# Patient Record
Sex: FEMALE | Race: BLACK OR AFRICAN AMERICAN | NOT HISPANIC OR LATINO | Employment: FULL TIME | URBAN - METROPOLITAN AREA
[De-identification: names, ages, dates, MRNs, and addresses within clinical notes are randomized per-mention and may not be internally consistent; named-entity substitution may affect disease eponyms.]

---

## 2017-05-23 ENCOUNTER — ALLSCRIPTS OFFICE VISIT (OUTPATIENT)
Dept: OTHER | Facility: OTHER | Age: 52
End: 2017-05-23

## 2017-05-23 DIAGNOSIS — S46.119A STRAIN OF MUSCLE, FASCIA AND TENDON OF LONG HEAD OF BICEPS, UNSPECIFIED ARM, INITIAL ENCOUNTER: ICD-10-CM

## 2017-09-06 ENCOUNTER — ALLSCRIPTS OFFICE VISIT (OUTPATIENT)
Dept: OTHER | Facility: OTHER | Age: 52
End: 2017-09-06

## 2018-01-14 VITALS
HEART RATE: 72 BPM | BODY MASS INDEX: 39.93 KG/M2 | RESPIRATION RATE: 16 BRPM | DIASTOLIC BLOOD PRESSURE: 86 MMHG | TEMPERATURE: 96.8 F | WEIGHT: 217 LBS | SYSTOLIC BLOOD PRESSURE: 132 MMHG | HEIGHT: 62 IN

## 2018-01-14 VITALS
SYSTOLIC BLOOD PRESSURE: 128 MMHG | BODY MASS INDEX: 39.98 KG/M2 | HEIGHT: 62 IN | DIASTOLIC BLOOD PRESSURE: 70 MMHG | RESPIRATION RATE: 16 BRPM | TEMPERATURE: 97.4 F | HEART RATE: 70 BPM | WEIGHT: 217.25 LBS

## 2018-02-09 ENCOUNTER — TELEPHONE (OUTPATIENT)
Dept: FAMILY MEDICINE CLINIC | Facility: CLINIC | Age: 53
End: 2018-02-09

## 2018-02-09 ENCOUNTER — OFFICE VISIT (OUTPATIENT)
Dept: FAMILY MEDICINE CLINIC | Facility: CLINIC | Age: 53
End: 2018-02-09
Payer: COMMERCIAL

## 2018-02-09 VITALS
RESPIRATION RATE: 16 BRPM | WEIGHT: 229.6 LBS | TEMPERATURE: 98.4 F | SYSTOLIC BLOOD PRESSURE: 124 MMHG | HEART RATE: 68 BPM | BODY MASS INDEX: 41.99 KG/M2 | DIASTOLIC BLOOD PRESSURE: 92 MMHG

## 2018-02-09 DIAGNOSIS — R07.9 CHEST PAIN, UNSPECIFIED TYPE: Primary | ICD-10-CM

## 2018-02-09 DIAGNOSIS — M94.0 COSTOCHONDRITIS: ICD-10-CM

## 2018-02-09 PROBLEM — S46.119A STRAIN OF LONG HEAD OF BICEPS: Status: ACTIVE | Noted: 2017-05-23

## 2018-02-09 PROBLEM — I83.90 VARICOSE VEIN OF LEG: Status: ACTIVE | Noted: 2017-09-06

## 2018-02-09 PROBLEM — S76.019A STRAIN OF HIP: Status: ACTIVE | Noted: 2017-09-06

## 2018-02-09 PROBLEM — E66.9 OBESITY, UNSPECIFIED OBESITY SEVERITY, UNSPECIFIED OBESITY TYPE: Status: ACTIVE | Noted: 2017-05-23

## 2018-02-09 PROCEDURE — 99214 OFFICE O/P EST MOD 30 MIN: CPT | Performed by: NURSE PRACTITIONER

## 2018-02-09 PROCEDURE — 93000 ELECTROCARDIOGRAM COMPLETE: CPT | Performed by: NURSE PRACTITIONER

## 2018-02-09 RX ORDER — MELOXICAM 15 MG/1
TABLET ORAL
COMMUNITY
Start: 2017-09-06 | End: 2018-02-09 | Stop reason: ALTCHOICE

## 2018-02-09 NOTE — PROGRESS NOTES
Assessment/Plan:       Diagnoses and all orders for this visit:    Chest pain, unspecified type  -     POCT ECG  EKG WNL, no acute ischemia  Discussed differentials for chest pain including musculoskeletal pain, as she has tenderness on palpation over left chest wall, or esophagitis  Will trial Tylenol and Zantac BID  Costochondritis              No Follow-up on file  Subjective:      Patient ID: Derrick Cho is a 47 y o  female  Chief Complaint   Patient presents with    Chest Pain     yesterday 2/8/18    Nausea    Fatigue       She woke up yesterday morning with chest pain, which has been intermittent  This is accompanied by nausea and is worse when she eats or drinks  It is a tightness sensation and also worse with movement  Not exacerbated my exertion  Denies sore throat, palpitations, lower extremity swelling, SOB, wheezing, vomiting or diarrhea  She has phlegm in her throat  No personal cardiac history  Has not taken anything OTC for symptoms  The following portions of the patient's history were reviewed and updated as appropriate: allergies, current medications, past family history, past medical history, past social history, past surgical history and problem list     Review of Systems   Constitutional: Positive for fatigue  Negative for chills and fever  HENT: Positive for postnasal drip  Respiratory: Negative for cough, shortness of breath and wheezing  Cardiovascular: Positive for chest pain  Negative for palpitations and leg swelling  Gastrointestinal: Positive for nausea  Negative for vomiting  Neurological: Positive for headaches  No current outpatient prescriptions on file  No current facility-administered medications for this visit  Objective:    /92   Pulse 68   Temp 98 4 °F (36 9 °C)   Resp 16   Wt 104 kg (229 lb 9 6 oz)   BMI 41 99 kg/m²        Physical Exam   Constitutional: She appears well-developed and well-nourished  HENT:   Right Ear: Tympanic membrane, external ear and ear canal normal    Left Ear: Tympanic membrane, external ear and ear canal normal    Nose: No mucosal edema  Mouth/Throat: Oropharynx is clear and moist and mucous membranes are normal    Eyes: Conjunctivae are normal    Cardiovascular: Normal rate, regular rhythm and normal heart sounds  Pulmonary/Chest: Effort normal and breath sounds normal    Abdominal: Bowel sounds are normal  She exhibits no distension  There is no splenomegaly or hepatomegaly  There is no tenderness  Musculoskeletal:   Left chest wall tender on palpation  Lymphadenopathy:        Right cervical: No superficial cervical adenopathy present  Left cervical: No superficial cervical adenopathy present  Skin: No rash noted  Psychiatric: She has a normal mood and affect                Jett Cullen

## 2018-02-09 NOTE — TELEPHONE ENCOUNTER
She called and stated she has had chest pain for the last two days  She stated she has some nausea but no other symptoms  She has no shortness of breath  Appointment made for today

## 2018-02-09 NOTE — PATIENT INSTRUCTIONS
Tylenol 650mg every 4-6 hours  May also try Zantac (ranitidine) 150mg twice daily for esophagitis/reflux symptoms

## 2018-05-04 ENCOUNTER — OFFICE VISIT (OUTPATIENT)
Dept: FAMILY MEDICINE CLINIC | Facility: CLINIC | Age: 53
End: 2018-05-04
Payer: COMMERCIAL

## 2018-05-04 VITALS
WEIGHT: 233 LBS | TEMPERATURE: 98.1 F | SYSTOLIC BLOOD PRESSURE: 120 MMHG | HEIGHT: 62 IN | DIASTOLIC BLOOD PRESSURE: 80 MMHG | HEART RATE: 82 BPM | BODY MASS INDEX: 42.88 KG/M2 | RESPIRATION RATE: 20 BRPM

## 2018-05-04 DIAGNOSIS — N30.01 ACUTE CYSTITIS WITH HEMATURIA: Primary | ICD-10-CM

## 2018-05-04 PROBLEM — S76.019A STRAIN OF HIP: Status: RESOLVED | Noted: 2017-09-06 | Resolved: 2018-05-04

## 2018-05-04 PROBLEM — S46.119A STRAIN OF LONG HEAD OF BICEPS: Status: RESOLVED | Noted: 2017-05-23 | Resolved: 2018-05-04

## 2018-05-04 PROBLEM — E66.9 OBESITY, UNSPECIFIED OBESITY SEVERITY, UNSPECIFIED OBESITY TYPE: Status: RESOLVED | Noted: 2017-05-23 | Resolved: 2018-05-04

## 2018-05-04 LAB
SL AMB  POCT GLUCOSE, UA: NORMAL
SL AMB LEUKOCYTE ESTERASE,UA: 75
SL AMB POCT BILIRUBIN,UA: NEGATIVE
SL AMB POCT BLOOD,UA: 250
SL AMB POCT CLARITY,UA: ABNORMAL
SL AMB POCT COLOR,UA: YELLOW
SL AMB POCT KETONES,UA: NEGATIVE
SL AMB POCT NITRITE,UA: NEGATIVE
SL AMB POCT PH,UA: 7
SL AMB POCT SPECIFIC GRAVITY,UA: 1.01
SL AMB POCT URINE PROTEIN: NEGATIVE
SL AMB POCT UROBILINOGEN: NORMAL

## 2018-05-04 PROCEDURE — 81003 URINALYSIS AUTO W/O SCOPE: CPT | Performed by: FAMILY MEDICINE

## 2018-05-04 PROCEDURE — 99213 OFFICE O/P EST LOW 20 MIN: CPT | Performed by: FAMILY MEDICINE

## 2018-05-04 RX ORDER — CIPROFLOXACIN 250 MG/1
250 TABLET, FILM COATED ORAL EVERY 12 HOURS SCHEDULED
Qty: 6 TABLET | Refills: 0 | Status: SHIPPED | OUTPATIENT
Start: 2018-05-04 | End: 2018-05-07

## 2018-05-04 NOTE — PROGRESS NOTES
Assessment/Plan:    No problem-specific Assessment & Plan notes found for this encounter  ua pos    Hematuria in past, seen urology in past and normal     Diagnoses and all orders for this visit:    Acute cystitis with hematuria  -     ciprofloxacin (CIPRO) 250 mg tablet; Take 1 tablet (250 mg total) by mouth every 12 (twelve) hours for 3 days  -     POCT urine dip auto non-scope        Urinate after intercourse  Probiotics  Hx of normal hematuria w/u      Return if symptoms worsen or fail to improve  Subjective:      Patient ID: Ceci Gann is a 47 y o  female  Chief Complaint   Patient presents with    Possible UTI     frequent urination    Difficulty Urinating     started this morning        HPI  Urgency, frequency, dysuria, cloudy, 2d, no fever, no dc, no high risk exposure, 1-2x/10y, no f/c  Hysterectomy 2y ago, no periods      The following portions of the patient's history were reviewed and updated as appropriate: allergies, current medications, past family history, past medical history, past social history, past surgical history and problem list     Review of Systems   Constitutional: Negative for fever  Respiratory: Negative for shortness of breath  Current Outpatient Prescriptions   Medication Sig Dispense Refill    ciprofloxacin (CIPRO) 250 mg tablet Take 1 tablet (250 mg total) by mouth every 12 (twelve) hours for 3 days 6 tablet 0     No current facility-administered medications for this visit  Objective:    /80   Pulse 82   Temp 98 1 °F (36 7 °C)   Resp 20   Ht 5' 2" (1 575 m)   Wt 106 kg (233 lb)   BMI 42 62 kg/m²        Physical Exam   Constitutional: She appears well-developed  HENT:   Head: Normocephalic  Eyes: Conjunctivae are normal    Neck: Neck supple  Cardiovascular: Normal rate and intact distal pulses  Pulmonary/Chest: Effort normal  No respiratory distress  Abdominal: Soft  Musculoskeletal: She exhibits no edema or deformity  Neurological: She is alert  Skin: Skin is warm and dry  Psychiatric: Her behavior is normal  Thought content normal    Nursing note and vitals reviewed        No cvat       Robinson Bertha DO

## 2018-09-06 ENCOUNTER — TELEPHONE (OUTPATIENT)
Dept: FAMILY MEDICINE CLINIC | Facility: CLINIC | Age: 53
End: 2018-09-06

## 2018-09-10 ENCOUNTER — HOSPITAL ENCOUNTER (EMERGENCY)
Facility: HOSPITAL | Age: 53
Discharge: HOME | End: 2018-09-10
Attending: EMERGENCY MEDICINE | Admitting: EMERGENCY MEDICINE
Payer: COMMERCIAL

## 2018-09-10 ENCOUNTER — APPOINTMENT (EMERGENCY)
Dept: CT IMAGING | Facility: HOSPITAL | Age: 53
End: 2018-09-10
Payer: COMMERCIAL

## 2018-09-10 VITALS
SYSTOLIC BLOOD PRESSURE: 129 MMHG | HEART RATE: 88 BPM | OXYGEN SATURATION: 99 % | WEIGHT: 231.26 LBS | RESPIRATION RATE: 16 BRPM | TEMPERATURE: 98.3 F | BODY MASS INDEX: 42.3 KG/M2 | DIASTOLIC BLOOD PRESSURE: 60 MMHG

## 2018-09-10 DIAGNOSIS — R31.9 HEMATURIA: Primary | ICD-10-CM

## 2018-09-10 DIAGNOSIS — N63.20 LEFT BREAST MASS: ICD-10-CM

## 2018-09-10 LAB
ALBUMIN SERPL BCP-MCNC: 3.4 G/DL (ref 3.5–5)
ALP SERPL-CCNC: 113 U/L (ref 46–116)
ALT SERPL W P-5'-P-CCNC: 21 U/L (ref 12–78)
ANION GAP SERPL CALCULATED.3IONS-SCNC: 8 MMOL/L (ref 4–13)
APTT PPP: 27 SECONDS (ref 24–36)
AST SERPL W P-5'-P-CCNC: 12 U/L (ref 5–45)
BACTERIA UR QL AUTO: ABNORMAL /HPF
BASOPHILS # BLD AUTO: 0.05 THOUSANDS/ΜL (ref 0–0.1)
BASOPHILS NFR BLD AUTO: 1 % (ref 0–1)
BILIRUB SERPL-MCNC: 0.2 MG/DL (ref 0.2–1)
BILIRUB UR QL STRIP: NEGATIVE
BUN SERPL-MCNC: 11 MG/DL (ref 5–25)
CALCIUM SERPL-MCNC: 8.9 MG/DL (ref 8.3–10.1)
CHLORIDE SERPL-SCNC: 106 MMOL/L (ref 100–108)
CLARITY UR: ABNORMAL
CO2 SERPL-SCNC: 29 MMOL/L (ref 21–32)
COLOR UR: ABNORMAL
CREAT SERPL-MCNC: 1.02 MG/DL (ref 0.6–1.3)
EOSINOPHIL # BLD AUTO: 0.15 THOUSAND/ΜL (ref 0–0.61)
EOSINOPHIL NFR BLD AUTO: 1 % (ref 0–6)
ERYTHROCYTE [DISTWIDTH] IN BLOOD BY AUTOMATED COUNT: 13.2 % (ref 11.6–15.1)
GFR SERPL CREATININE-BSD FRML MDRD: 74 ML/MIN/1.73SQ M
GLUCOSE SERPL-MCNC: 96 MG/DL (ref 65–140)
GLUCOSE UR STRIP-MCNC: NEGATIVE MG/DL
HCT VFR BLD AUTO: 43 % (ref 34.8–46.1)
HGB BLD-MCNC: 14 G/DL (ref 11.5–15.4)
HGB UR QL STRIP.AUTO: ABNORMAL
IMM GRANULOCYTES # BLD AUTO: 0.04 THOUSAND/UL (ref 0–0.2)
IMM GRANULOCYTES NFR BLD AUTO: 0 % (ref 0–2)
INR PPP: 0.84 (ref 0.86–1.17)
KETONES UR STRIP-MCNC: NEGATIVE MG/DL
LEUKOCYTE ESTERASE UR QL STRIP: ABNORMAL
LYMPHOCYTES # BLD AUTO: 3.83 THOUSANDS/ΜL (ref 0.6–4.47)
LYMPHOCYTES NFR BLD AUTO: 35 % (ref 14–44)
MCH RBC QN AUTO: 29.2 PG (ref 26.8–34.3)
MCHC RBC AUTO-ENTMCNC: 32.6 G/DL (ref 31.4–37.4)
MCV RBC AUTO: 90 FL (ref 82–98)
MONOCYTES # BLD AUTO: 0.65 THOUSAND/ΜL (ref 0.17–1.22)
MONOCYTES NFR BLD AUTO: 6 % (ref 4–12)
NEUTROPHILS # BLD AUTO: 6.22 THOUSANDS/ΜL (ref 1.85–7.62)
NEUTS SEG NFR BLD AUTO: 57 % (ref 43–75)
NITRITE UR QL STRIP: NEGATIVE
NON-SQ EPI CELLS URNS QL MICRO: ABNORMAL /HPF
NRBC BLD AUTO-RTO: 0 /100 WBCS
PH UR STRIP.AUTO: 6 [PH] (ref 4.5–8)
PLATELET # BLD AUTO: 254 THOUSANDS/UL (ref 149–390)
PMV BLD AUTO: 9.9 FL (ref 8.9–12.7)
POTASSIUM SERPL-SCNC: 4 MMOL/L (ref 3.5–5.3)
PROT SERPL-MCNC: 7.4 G/DL (ref 6.4–8.2)
PROT UR STRIP-MCNC: ABNORMAL MG/DL
PROTHROMBIN TIME: 11.3 SECONDS (ref 11.8–14.2)
RBC # BLD AUTO: 4.8 MILLION/UL (ref 3.81–5.12)
RBC #/AREA URNS AUTO: ABNORMAL /HPF
SODIUM SERPL-SCNC: 143 MMOL/L (ref 136–145)
SP GR UR STRIP.AUTO: >=1.03 (ref 1–1.03)
UROBILINOGEN UR QL STRIP.AUTO: 0.2 E.U./DL
WBC # BLD AUTO: 10.94 THOUSAND/UL (ref 4.31–10.16)
WBC #/AREA URNS AUTO: ABNORMAL /HPF

## 2018-09-10 PROCEDURE — 85610 PROTHROMBIN TIME: CPT | Performed by: PHYSICIAN ASSISTANT

## 2018-09-10 PROCEDURE — 87186 SC STD MICRODIL/AGAR DIL: CPT | Performed by: PHYSICIAN ASSISTANT

## 2018-09-10 PROCEDURE — 80053 COMPREHEN METABOLIC PANEL: CPT | Performed by: PHYSICIAN ASSISTANT

## 2018-09-10 PROCEDURE — 87086 URINE CULTURE/COLONY COUNT: CPT | Performed by: PHYSICIAN ASSISTANT

## 2018-09-10 PROCEDURE — 85730 THROMBOPLASTIN TIME PARTIAL: CPT | Performed by: PHYSICIAN ASSISTANT

## 2018-09-10 PROCEDURE — 99284 EMERGENCY DEPT VISIT MOD MDM: CPT

## 2018-09-10 PROCEDURE — 87077 CULTURE AEROBIC IDENTIFY: CPT | Performed by: PHYSICIAN ASSISTANT

## 2018-09-10 PROCEDURE — 85025 COMPLETE CBC W/AUTO DIFF WBC: CPT | Performed by: PHYSICIAN ASSISTANT

## 2018-09-10 PROCEDURE — 74176 CT ABD & PELVIS W/O CONTRAST: CPT

## 2018-09-10 PROCEDURE — 96374 THER/PROPH/DIAG INJ IV PUSH: CPT

## 2018-09-10 PROCEDURE — 81001 URINALYSIS AUTO W/SCOPE: CPT | Performed by: PHYSICIAN ASSISTANT

## 2018-09-10 PROCEDURE — 36415 COLL VENOUS BLD VENIPUNCTURE: CPT | Performed by: PHYSICIAN ASSISTANT

## 2018-09-10 RX ORDER — CIPROFLOXACIN 500 MG/1
500 TABLET, FILM COATED ORAL 2 TIMES DAILY
Qty: 14 TABLET | Refills: 0 | Status: SHIPPED | OUTPATIENT
Start: 2018-09-10 | End: 2018-09-17

## 2018-09-10 RX ORDER — MULTIVITAMIN
1 CAPSULE ORAL DAILY
COMMUNITY
End: 2020-12-23 | Stop reason: HOSPADM

## 2018-09-10 RX ORDER — CIPROFLOXACIN 500 MG/1
500 TABLET, FILM COATED ORAL ONCE
Status: COMPLETED | OUTPATIENT
Start: 2018-09-10 | End: 2018-09-10

## 2018-09-10 RX ORDER — IBUPROFEN 600 MG/1
600 TABLET ORAL EVERY 8 HOURS PRN
Qty: 15 TABLET | Refills: 0 | Status: SHIPPED | OUTPATIENT
Start: 2018-09-10 | End: 2018-09-21 | Stop reason: ALTCHOICE

## 2018-09-10 RX ADMIN — HYDROMORPHONE HYDROCHLORIDE 0.5 MG: 1 INJECTION, SOLUTION INTRAMUSCULAR; INTRAVENOUS; SUBCUTANEOUS at 07:56

## 2018-09-10 RX ADMIN — CIPROFLOXACIN 500 MG: 500 TABLET, FILM COATED ORAL at 08:30

## 2018-09-10 NOTE — ED CARE HANDOFF
Emergency Department Sign Out Note        Sign out and transfer of care from [facility], [place], operated by [facility]  See Separate Emergency Department note  The patient, Agustin Morales, was evaluated by the previous provider for hematuria and urinary frequency  Workup Completed:  yes    ED Course / Workup Pending (followup):  CT report pending                      ED Course as of Sep 10 0826   Mon Sep 10, 2018   6693 Phone call received from radiologist regarding study results  No stone or lesions appreciated within the urinary tract to account for patient's hematuria  Of note, study is limited with absence of contrast   Patient's symptoms were very acute in onset associated with urinary urgency, frequency as well as presence of a large amount of white blood cells in the urine  At this time treating as hemorrhagic cystitis  Will refer patient to follow up with Urology for further evaluation  Patient demonstrates understanding of this plan  Patient additionally informed of abnormal region incompletely visualized within the left breast   She does have a PCP and will follow up with them for breast imaging  She has not had a mammogram   She & visitor present demonstrate understanding of need to have this further evaluated  Procedures  MDM  CritCare Time      Disposition  Final diagnoses:   Hematuria   Left breast mass     Time reflects when diagnosis was documented in both MDM as applicable and the Disposition within this note     Time User Action Codes Description Comment    9/10/2018  8:18 AM Andrew BHAKTA Add [R31 9] Hematuria     9/10/2018  8:19 AM Andrew BHAKTA Add [N63 20] Left breast mass       ED Disposition     ED Disposition Condition Comment    Discharge  Agustin Morales discharge to home/self care      Condition at discharge: Good        Follow-up Information     Follow up With Specialties Details Why [address] [facility] Schedule an appointment as soon as possible for a visit For further evaluation of hematuria (blood in the urine) Norma [address] [place] [address] [facility] [address] [facility], DO Family Medicine Schedule an appointment as soon as possible for a visit For further evaluation of left breast abnormality seen on CT scan [address] [place] [address]  [phone]          Patient's Medications   Discharge Prescriptions    CIPROFLOXACIN (CIPRO) 500 MG TABLET    Take 1 tablet (500 mg total) by mouth 2 (two) times a day for 7 days       Start Date: 9/10/2018 End Date: 9/17/2018       Order Dose: 500 mg       Quantity: 14 tablet    Refills: 0    IBUPROFEN (MOTRIN) 600 MG TABLET    Take 1 tablet (600 mg total) by mouth every 8 (eight) hours as needed for mild pain (pain)       Start Date: 9/10/2018 End Date: --       Order Dose: 600 mg       Quantity: 15 tablet    Refills: 0     No discharge procedures on file         ED Provider  Electronically Signed by     Andres Fajardo MD  09/10/18 8704

## 2018-09-10 NOTE — ED PROVIDER NOTES
History  Chief Complaint   Patient presents with    Blood in Urine     per pt  she noticed some blood in her urine this morning, pt states her urine was bright red, pt denies any lightheadedness or dizziness  Gerardo Jimenez ([id number]) is a 47 y o   female Adult patient, presenting to the Emergency Department, accompanied by , who presents with a chief complaint of Patient presents with: Blood in Urine: per pt  she noticed some blood in her urine this morning, pt states her urine was bright red  Blood in Urine  -Patient with oscar hematuria  -Patient states that she has never, before a few months ago, never had a UTI  -Pain in the patients suprapubic region, as well as the patients lower back, radiating to her groin bilaterally  -Left lower back with more discomfort  -No fevers  -Patient woke up at 0430, and between 0430 and 0645, passed bloody urine roughly 20 times  -No CP  -No Dyspnea  -No upper abdominal pain  -Prior microscopic hematuria - renal and bladder evaluation was performed with  Cystoscopy, labs, and prior imaging, all with normal results  Medications: Patient takes no medications on a regular basis, Vitamins  Allergies: No reported food allergies, No reported environmental allergies, Amoxicillin  Overnight Hospitalizations: No prior hospitalizations  Vaccinations: Vaccinations UTD, as per Patient  Past Medical History: Past Medical History Includes: Prior hematuria              Prior to Admission Medications   Prescriptions Last Dose Informant Patient Reported? Taking?    Multiple Vitamin (MULTIVITAMIN) capsule   Yes Yes   Sig: Take 1 capsule by mouth daily      Facility-Administered Medications: None       Past Medical History:   Diagnosis Date    Bright red rectal bleeding     last assessed 4/15/15    Chronic constipation     last assessed 4/15/15     Hematuria     last assessed 4/6/15     Iron deficiency anemia     resolved 11/2/16        Past Surgical History:   Procedure Laterality Date     SECTION      CYSTOSCOPY  2015    diagnostic / Managed by Armen Wilson / dawson 7/29/15        Family History   Problem Relation Age of Onset    Colon cancer Mother     Hypertension Mother     Hypothyroidism Mother     Prostate cancer Father      I have reviewed and agree with the history as documented  Social History   Substance Use Topics    Smoking status: Former Smoker    Smokeless tobacco: Former User      Comment: never a smoker per allscript     Alcohol use No      Comment: non drinker/ no alcohol use         Review of Systems  Review of Systems: The Patient/Parent Denies the following: Negative, Except as noted in HPI  Physical Exam  Physical Exam  General: Well appearing 47 y o  female patient, in no acute distress  Skin: No rashes, masses, or lesions noted  HEENT: Atraumatic & Normocephalic  External ears normal, with no noted abnormalities or deformities  Bilateral canals examined, without noted edema or discomfort  No pain while pulling the tragus  TM well visualized bilaterally, with no noted obstruction, effusion, erythema, or air fluid levels  No noted enlargement of the mastoid processes bilaterally  EOMI, PERRL, Conjunctiva without injection bilaterally  No conjunctival drainage noted bilaterally  Nares patent bilaterally, with no noted obstructions, erythema, or drainage  No noted rhinorrhea  Pharynx well visualized, with no exudate noted in the posterior pharynx  Tonsils are not enlarged  Gingival surfaces are within normal limits  Neck: Soft, supple, and non-tender  No enlargement of the anterior cervical, posterior cervical, or occipital lymph notes  Cardiac: Regular rate and rhythm, with no noted murmurs, rubs, or gallops  Pulmonary: Normal Appearance  Clear to auscultation, with no noted rales, rhonchi, or wheezes  Abdomen: Normal appearance   Dull to palpation, except over the gastric bubble, which was mildly tympanic  Bowel sounds were within normal limits, with no noted high pitch sounds heard  Mildly tender to the suprapubic region, as well as mildly to the bilateral lower quadrants  Positive bilateral costovertebral angle tenderness noted bilaterally  No other abdominal tenderness was noted  Negative Evans sign  No pain with palpation at [facility]  Vital Signs  ED Triage Vitals   Temperature Pulse Respirations Blood Pressure SpO2   09/10/18 0637 09/10/18 0640 09/10/18 0640 09/10/18 0640 09/10/18 0640   98 3 °F (36 8 °C) 70 20 166/88 98 %      Temp Source Heart Rate Source Patient Position - Orthostatic VS BP Location FiO2 (%)   09/10/18 0637 09/10/18 0640 09/10/18 0640 09/10/18 0640 --   Oral Monitor Lying Right arm       Pain Score       --                  Vitals:    09/10/18 0640   BP: 166/88   Pulse: 70   Patient Position - Orthostatic VS: Lying       Visual Acuity      ED Medications  Medications - No data to display    Diagnostic Studies  Results Reviewed     Procedure Component Value Units Date/Time    Urine Microscopic [[id number]]  (Abnormal) Collected:  09/10/18 0651    Lab Status:  Final result Specimen:  Urine from Urine, Clean Catch Updated:  09/10/18 0712     RBC, UA Innumerable (A) /hpf      WBC, UA Innumerable (A) /hpf      Epithelial Cells Occasional /hpf      Bacteria, UA None Seen /hpf     Urine culture [[id number]] Collected:  09/10/18 0651    Lab Status: In process Specimen:  Urine from Urine, Clean Catch Updated:  09/10/18 0712    Comprehensive metabolic panel [[id number]] Collected:  09/10/18 0708    Lab Status: In process Specimen:  Blood from Arm, Right Updated:  09/10/18 67[address] [facility] [[id number]] Collected:  09/10/18 0708    Lab Status: In process Specimen:  Blood from Arm, Right Updated:  09/10/18 0712    APTT [[id number]] Collected:  09/10/18 0708    Lab Status:   In process Specimen:  Blood from Arm, Right Updated:  09/10/18 0712    CBC and differential [[id number]] Collected:  09/10/18 0708    Lab Status: In process Specimen:  Blood from Arm, Right Updated:  09/10/18 1175    UA w Reflex to Microscopic w Reflex to Culture [[id number]]  (Abnormal) Collected:  09/10/18 0651    Lab Status:  Final result Specimen:  Urine from Urine, Clean Catch Updated:  09/10/18 0705     Color, UA Red     Clarity, UA Cloudy     Specific Gravity, UA >=1 030     pH, UA 6 0     Leukocytes, UA Small (A)     Nitrite, UA Negative     Protein,  (2+) (A) mg/dl      Glucose, UA Negative mg/dl      Ketones, UA Negative mg/dl      Urobilinogen, UA 0 2 E U /dl      Bilirubin, UA Negative     Blood, UA Large (A)                 CT renal stone study abdomen pelvis without contrast    (Results Pending)              Procedures  Procedures       Phone Contacts  ED Phone Contact    ED Course                               MDM  Number of Diagnoses or Management Options  Diagnosis management comments: Patient presents the emergency department with complaint of flank pain, suprapubic discomfort, painful urination, as well as oscar bloody hematuria  The patient states she was in her normal state of health up until this morning, when she began to have oscar hematuria, associated with increased frequency of bladder movements, where as the patient had a total of greater than 20 bladder movements in a 3 hour period  As the patient was in her normal state of health up until yesterday evening, there is a notable concern for potential non infectious pathology, as well as renal stones, pyelonephritis, or complicated urinary tract infection  In addition, as the patient has had notably severe oscar hematuria, we there is also concern for potential malignant process, in addition to the above noted  As such, the patient will be evaluated with a basic laboratory analysis, including CBC, CMP, as well as PTT  In addition, as the hematuria was notably severe, the patient will be evaluated with a renal stone CT scan      This plan was discussed with the patient, and the patient is agreement and understanding of this plan  Amount and/or Complexity of Data Reviewed  Clinical lab tests: reviewed and ordered  Tests in the radiology section of CPT®: reviewed  Decide to obtain previous medical records or to obtain history from someone other than the patient: yes  Obtain history from someone other than the patient: yes  Review and summarize past medical records: yes  Discuss the patient with other providers: yes  Independent visualization of images, tracings, or specimens: yes    Risk of Complications, Morbidity, and/or Mortality  Presenting problems: moderate  Diagnostic procedures: moderate  Management options: moderate    Patient Progress  Patient progress: stable    CritCare Time    Disposition  Final diagnoses:   None     ED Disposition     None      Follow-up Information    None         Patient's Medications   Discharge Prescriptions    No medications on file     No discharge procedures on file      ED Provider  Electronically Signed by           Audrey Perez PA-C  09/10/18 6994

## 2018-09-10 NOTE — DISCHARGE INSTRUCTIONS
Acute Hematuria   WHAT YOU SHOULD KNOW:   Hematuria is blood in your urine from an injury or medical condition  Acute means the problem starts suddenly, worsens quickly, and lasts a short time  Your urine may be bright red to dark brown  Some common causes of hematuria are bladder infection, kidney stone, trauma to the kidneys or bladder, and some medications  Sometimes blood clots can block the urethra so that you cannot empty your bladder  AFTER YOU LEAVE:   Medicines:  Ask about these or other medicines you may need to treat the cause of your acute hematuria:  · Antibiotics: This medicine is given to fight or prevent an infection caused by bacteria  Always take your antibiotics exactly as ordered by your healthcare provider  Do not stop taking your medicine unless directed by your healthcare provider  Never save antibiotics or take leftover antibiotics that were given to you for another illness  · Take your medicine as directed  Call your healthcare provider if you think your medicine is not helping or if you have side effects  Tell him if you are allergic to any medicine  Keep a list of the medicines, vitamins, and herbs you take  Include the amounts, and when and why you take them  Bring the list or the pill bottles to follow-up visits  Carry your medicine list with you in case of an emergency  Increase the amount of fluid you drink:  Drink clear fluids to help flush the blood from your urinary tract  Follow instructions about how much fluid to drink  Follow up with your healthcare provider as directed: Your healthcare provider will tell you how often to come in for follow-up visits  He may refer you to a specialist, such as a urologist or nephrologist  The specialists may do tests or procedures to find more serious problems with your urinary system  Write down your questions so you remember to ask them during your visits     Contact your healthcare provider if:   · You have a fever that gets worse or does not go away with treatment  · You cannot keep liquids or medicines down  · Your urine gets darker, even after you drink extra liquids  · You have questions or concerns about your condition, treatment, or care  Seek care immediately or call 911 if:   · You have blood in your urine after a new injury, such as a fall  · You are urinating very small amounts or not at all  · You feel like you cannot empty your bladder  · You have severe back or side pain that does not go away with treatment  © 2014 [employer] is for End User's use only and may not be sold, redistributed or otherwise used for commercial purposes  All illustrations and images included in CareNotes® are the copyrighted property of [employer] M [employer]  or [employer]  The above information is an  only  It is not intended as medical advice for individual conditions or treatments  Talk to your doctor, nurse or pharmacist before following any medical regimen to see if it is safe and effective for you  Mammogram   WHAT YOU NEED TO KNOW:   What do I need to know about a mammogram?  A mammogram is an x-ray of your breasts to screen for breast cancer  Experts recommend mammograms every 2 years starting at age 48 years  You may need a mammogram at age 52 years or younger if you have an increased risk for breast cancer  Talk to your healthcare provider about when you should start having mammograms and how often you need them  How do I prepare for a mammogram?   · Do not use deodorant, powder, lotion, or perfume  These products may cause particles to appear on your mammogram      · Wear a 2-piece outfit  · If your breasts are tender before your monthly period, do not have a mammogram during this time  Schedule your mammogram to be done 1 week after your period ends      · If you are breastfeeding, express as much milk as possible before the mammogram     · Bring a list of the dates and places of your past mammograms and other breast tests or treatments  What will happen during a mammogram?  A top view and a side view x-ray are usually done for each breast  Tell healthcare providers if you have breast implants or breast problems before you have your mammogram  You may need extra x-rays of each breast   · You will be given a hospital gown  Take off your clothes from the waist up  Wear the hospital gown so that it opens in the front  · You will sit or stand next to a small x-ray machine  The healthcare provider will help you place one of your breasts on the x-ray plate  Your arm and breast will be moved until your breast is in the correct position  · Your breast will be gently pressed between 2 plastic plates for a few seconds while the x-ray is taken  This may be uncomfortable  · You will be asked to hold your breath while the x-ray is taken  Another x-ray will be taken of the same breast after the position of the x-ray machine has been changed  · Your other breast will be x-rayed the same way  What will happen after my mammogram?  Your breasts may feel tender for a short while after the mammogram  You may resume your regular activities  Ask your healthcare provider when you should receive the results of your mammogram   What are the risks of a mammogram?  You will be exposed to a small amount of radiation  Some breast cancers may not show up on mammograms  When should I contact my healthcare provider? · You cannot make your appointment on time  · You do not receive your results when expected  · You have questions or concerns about the mammogram   CARE AGREEMENT:   You have the right to help plan your care  Learn about your health condition and how it may be treated  Discuss treatment options with your caregivers to decide what care you want to receive  You always have the right to refuse treatment  The above information is an  only   It is not intended as medical advice for individual conditions or treatments  Talk to your doctor, nurse or pharmacist before following any medical regimen to see if it is safe and effective for you  © 2017 [employer] Information is for End User's use only and may not be sold, redistributed or otherwise used for commercial purposes  All illustrations and images included in CareNotes® are the copyrighted property of A D A M , Inc  or [employer]

## 2018-09-10 NOTE — ED NOTES
Patient transported to [address] [place] [address], [address] [place] [address]  09/10/18 4895

## 2018-09-11 ENCOUNTER — TELEPHONE (OUTPATIENT)
Dept: FAMILY MEDICINE CLINIC | Facility: CLINIC | Age: 53
End: 2018-09-11

## 2018-09-11 DIAGNOSIS — Z12.39 BREAST CANCER SCREENING: Primary | ICD-10-CM

## 2018-09-12 LAB
BACTERIA UR CULT: ABNORMAL
BACTERIA UR CULT: ABNORMAL

## 2018-09-12 NOTE — TELEPHONE ENCOUNTER
S/w pt  Aware  mammo ordered  ----- Message from Lissette Walter sent at 9/11/2018  4:53 PM EDT -----      ----- Message -----  From: Mayo Rae MA  Sent: 9/11/2018  10:04 AM  To: [address] [facility]    Would someone have time to outreach to patient? She went in for bad UTI and they found Abnormality L Breast on CT scan  A lot going on I know she needs to come in but won't be able to answer questions if she has any  Thank you so much   Travis Torrez

## 2018-09-18 ENCOUNTER — HOSPITAL ENCOUNTER (OUTPATIENT)
Dept: RADIOLOGY | Facility: HOSPITAL | Age: 53
Discharge: HOME | End: 2018-09-18
Attending: FAMILY MEDICINE
Payer: COMMERCIAL

## 2018-09-18 DIAGNOSIS — Z12.39 BREAST CANCER SCREENING: ICD-10-CM

## 2018-09-18 PROCEDURE — 77067 SCR MAMMO BI INCL CAD: CPT

## 2018-09-21 ENCOUNTER — OFFICE VISIT (OUTPATIENT)
Dept: FAMILY MEDICINE CLINIC | Facility: CLINIC | Age: 53
End: 2018-09-21
Payer: COMMERCIAL

## 2018-09-21 ENCOUNTER — HOSPITAL ENCOUNTER (OUTPATIENT)
Dept: RADIOLOGY | Facility: HOSPITAL | Age: 53
Discharge: HOME | End: 2018-09-21
Attending: FAMILY MEDICINE
Payer: COMMERCIAL

## 2018-09-21 VITALS
SYSTOLIC BLOOD PRESSURE: 120 MMHG | HEART RATE: 80 BPM | WEIGHT: 227 LBS | BODY MASS INDEX: 41.77 KG/M2 | RESPIRATION RATE: 18 BRPM | DIASTOLIC BLOOD PRESSURE: 78 MMHG | TEMPERATURE: 95.9 F | HEIGHT: 62 IN

## 2018-09-21 DIAGNOSIS — R92.8 ABNORMAL MAMMOGRAM: ICD-10-CM

## 2018-09-21 DIAGNOSIS — R53.83 OTHER FATIGUE: ICD-10-CM

## 2018-09-21 DIAGNOSIS — E66.01 MORBID OBESITY (HCC): ICD-10-CM

## 2018-09-21 DIAGNOSIS — Z13.6 SCREENING FOR CARDIOVASCULAR, RESPIRATORY, AND GENITOURINARY DISEASES: ICD-10-CM

## 2018-09-21 DIAGNOSIS — Z00.00 HEALTHCARE MAINTENANCE: Primary | ICD-10-CM

## 2018-09-21 DIAGNOSIS — Z13.83 SCREENING FOR CARDIOVASCULAR, RESPIRATORY, AND GENITOURINARY DISEASES: ICD-10-CM

## 2018-09-21 DIAGNOSIS — Z13.89 SCREENING FOR CARDIOVASCULAR, RESPIRATORY, AND GENITOURINARY DISEASES: ICD-10-CM

## 2018-09-21 PROBLEM — N30.01 ACUTE CYSTITIS WITH HEMATURIA: Status: RESOLVED | Noted: 2018-05-04 | Resolved: 2018-09-21

## 2018-09-21 PROCEDURE — 76642 ULTRASOUND BREAST LIMITED: CPT

## 2018-09-21 PROCEDURE — 99396 PREV VISIT EST AGE 40-64: CPT | Performed by: FAMILY MEDICINE

## 2018-09-21 NOTE — PROGRESS NOTES
Assessment/Plan:    No problem-specific Assessment & Plan notes found for this encounter  Breast US in 6m aware  bmi and diet advised  Gyn f/u at pt request     Diagnoses and all orders for this visit:    Healthcare maintenance  -     Ambulatory referral to Gynecology; Future    BMI 40 0-44 9, adult ([facility] [address] )    Morbid obesity ([facility] [address] )    Screening for cardiovascular, respiratory, and genitourinary diseases  -     Lipid Panel with Direct LDL reflex; Future    Other fatigue  -     TSH, 3rd generation; Future    Other orders  -     acetaminophen (TYLENOL) 325 mg tablet; Take 650 mg by mouth every 6 (six) hours as needed for mild pain              No Follow-up on file  Subjective:      Patient ID: Clair Bui is a 47 y o  female  Chief Complaint   Patient presents with    Physical Exam     akrma        HPI  US breast normal  Eats healthy, can do better  Not much exercise  Tetanus utd  Had colonoscopy  M colon CA  The following portions of the patient's history were reviewed and updated as appropriate: allergies, current medications, past family history, past medical history, past social history, past surgical history and problem list     Review of Systems   Respiratory: Negative for shortness of breath  Cardiovascular: Negative for chest pain  Current Outpatient Prescriptions   Medication Sig Dispense Refill    acetaminophen (TYLENOL) 325 mg tablet Take 650 mg by mouth every 6 (six) hours as needed for mild pain      Multiple Vitamin (MULTIVITAMIN) capsule Take 1 capsule by mouth daily       No current facility-administered medications for this visit  Objective:    /78   Pulse 80   Temp (!) 95 9 °F (35 5 °C)   Resp 18   Ht 5' 2" (1 575 m)   Wt 103 kg (227 lb)   BMI 41 52 kg/m²        Physical Exam   Constitutional: She appears well-developed  No distress  HENT:   Head: Normocephalic  Mouth/Throat: No oropharyngeal exudate  Eyes: Conjunctivae are normal  No scleral icterus  Neck: Neck supple  No thyromegaly present  Cardiovascular: Normal rate and intact distal pulses  No murmur heard  Pulmonary/Chest: Effort normal  No respiratory distress  She has no wheezes  Abdominal: Soft  She exhibits no mass  There is no guarding  Musculoskeletal: She exhibits no edema or deformity  Neurological: She is alert  She exhibits normal muscle tone  Skin: Skin is warm and dry  No rash noted  No pallor  Psychiatric: Her behavior is normal  Thought content normal    Nursing note and vitals reviewed               Stoney Johns DO

## 2018-09-29 LAB
CHOLEST SERPL-MCNC: 214 MG/DL (ref 100–199)
HDLC SERPL-MCNC: 57 MG/DL
LDLC SERPL CALC-MCNC: 138 MG/DL (ref 0–99)
MICRODELETION SYND BLD/T FISH: NORMAL
TRIGL SERPL-MCNC: 97 MG/DL (ref 0–149)
TSH SERPL DL<=0.005 MIU/L-ACNC: 1.73 UIU/ML (ref 0.45–4.5)

## 2018-09-30 PROBLEM — Z91.89 FRAMINGHAM CARDIAC RISK <10% IN NEXT 10 YEARS: Status: ACTIVE | Noted: 2018-09-30

## 2018-12-15 ENCOUNTER — HOSPITAL ENCOUNTER (OUTPATIENT)
Facility: HOSPITAL | Age: 53
Setting detail: OBSERVATION
Discharge: HOME | End: 2018-12-16
Attending: EMERGENCY MEDICINE | Admitting: INTERNAL MEDICINE
Payer: COMMERCIAL

## 2018-12-15 DIAGNOSIS — R07.9 CHEST PAIN: Primary | ICD-10-CM

## 2018-12-15 LAB
ANION GAP SERPL CALCULATED.3IONS-SCNC: 7 MMOL/L (ref 4–13)
APTT PPP: 27 SECONDS (ref 26–38)
BASOPHILS # BLD AUTO: 0.06 THOUSANDS/ΜL (ref 0–0.1)
BASOPHILS NFR BLD AUTO: 1 % (ref 0–1)
BUN SERPL-MCNC: 13 MG/DL (ref 5–25)
CALCIUM SERPL-MCNC: 9.2 MG/DL (ref 8.3–10.1)
CHLORIDE SERPL-SCNC: 105 MMOL/L (ref 100–108)
CO2 SERPL-SCNC: 31 MMOL/L (ref 21–32)
CREAT SERPL-MCNC: 1 MG/DL (ref 0.6–1.3)
EOSINOPHIL # BLD AUTO: 0.15 THOUSAND/ΜL (ref 0–0.61)
EOSINOPHIL NFR BLD AUTO: 2 % (ref 0–6)
ERYTHROCYTE [DISTWIDTH] IN BLOOD BY AUTOMATED COUNT: 13.2 % (ref 11.6–15.1)
GFR SERPL CREATININE-BSD FRML MDRD: 75 ML/MIN/1.73SQ M
GLUCOSE SERPL-MCNC: 87 MG/DL (ref 65–140)
HCT VFR BLD AUTO: 41.6 % (ref 34.8–46.1)
HGB BLD-MCNC: 13.6 G/DL (ref 11.5–15.4)
IMM GRANULOCYTES # BLD AUTO: 0.03 THOUSAND/UL (ref 0–0.2)
IMM GRANULOCYTES NFR BLD AUTO: 0 % (ref 0–2)
INR PPP: 0.93 (ref 0.86–1.17)
LYMPHOCYTES # BLD AUTO: 5.48 THOUSANDS/ΜL (ref 0.6–4.47)
LYMPHOCYTES NFR BLD AUTO: 54 % (ref 14–44)
MCH RBC QN AUTO: 29.4 PG (ref 26.8–34.3)
MCHC RBC AUTO-ENTMCNC: 32.7 G/DL (ref 31.4–37.4)
MCV RBC AUTO: 90 FL (ref 82–98)
MONOCYTES # BLD AUTO: 0.59 THOUSAND/ΜL (ref 0.17–1.22)
MONOCYTES NFR BLD AUTO: 6 % (ref 4–12)
NEUTROPHILS # BLD AUTO: 3.7 THOUSANDS/ΜL (ref 1.85–7.62)
NEUTS SEG NFR BLD AUTO: 37 % (ref 43–75)
NRBC BLD AUTO-RTO: 0 /100 WBCS
PLATELET # BLD AUTO: 250 THOUSANDS/UL (ref 149–390)
PMV BLD AUTO: 10.5 FL (ref 8.9–12.7)
POTASSIUM SERPL-SCNC: 3.7 MMOL/L (ref 3.5–5.3)
PROTHROMBIN TIME: 12.2 SECONDS (ref 11.8–14.2)
RBC # BLD AUTO: 4.63 MILLION/UL (ref 3.81–5.12)
SODIUM SERPL-SCNC: 143 MMOL/L (ref 136–145)
TROPONIN I SERPL-MCNC: <0.02 NG/ML
WBC # BLD AUTO: 10.01 THOUSAND/UL (ref 4.31–10.16)

## 2018-12-15 PROCEDURE — 85610 PROTHROMBIN TIME: CPT | Performed by: EMERGENCY MEDICINE

## 2018-12-15 PROCEDURE — 93005 ELECTROCARDIOGRAM TRACING: CPT

## 2018-12-15 PROCEDURE — 80048 BASIC METABOLIC PNL TOTAL CA: CPT | Performed by: EMERGENCY MEDICINE

## 2018-12-15 PROCEDURE — 36415 COLL VENOUS BLD VENIPUNCTURE: CPT | Performed by: EMERGENCY MEDICINE

## 2018-12-15 PROCEDURE — 99285 EMERGENCY DEPT VISIT HI MDM: CPT

## 2018-12-15 PROCEDURE — 84484 ASSAY OF TROPONIN QUANT: CPT | Performed by: EMERGENCY MEDICINE

## 2018-12-15 PROCEDURE — 85730 THROMBOPLASTIN TIME PARTIAL: CPT | Performed by: EMERGENCY MEDICINE

## 2018-12-15 PROCEDURE — 85025 COMPLETE CBC W/AUTO DIFF WBC: CPT | Performed by: EMERGENCY MEDICINE

## 2018-12-15 RX ADMIN — NITROGLYCERIN 1 INCH: 20 OINTMENT TOPICAL at 21:35

## 2018-12-16 VITALS
HEIGHT: 62 IN | BODY MASS INDEX: 41.99 KG/M2 | OXYGEN SATURATION: 96 % | SYSTOLIC BLOOD PRESSURE: 114 MMHG | RESPIRATION RATE: 16 BRPM | WEIGHT: 228.18 LBS | HEART RATE: 67 BPM | TEMPERATURE: 98.2 F | DIASTOLIC BLOOD PRESSURE: 64 MMHG

## 2018-12-16 PROBLEM — R07.9 CHEST PAIN: Status: ACTIVE | Noted: 2018-12-16

## 2018-12-16 LAB
TROPONIN I SERPL-MCNC: <0.02 NG/ML
TROPONIN I SERPL-MCNC: <0.02 NG/ML

## 2018-12-16 PROCEDURE — 84484 ASSAY OF TROPONIN QUANT: CPT | Performed by: PHYSICIAN ASSISTANT

## 2018-12-16 PROCEDURE — 99236 HOSP IP/OBS SAME DATE HI 85: CPT | Performed by: INTERNAL MEDICINE

## 2018-12-16 RX ORDER — ACETAMINOPHEN 325 MG/1
650 TABLET ORAL EVERY 6 HOURS PRN
Status: DISCONTINUED | OUTPATIENT
Start: 2018-12-16 | End: 2018-12-16 | Stop reason: ALTCHOICE

## 2018-12-16 RX ORDER — ACETAMINOPHEN 325 MG/1
650 TABLET ORAL EVERY 4 HOURS PRN
Status: DISCONTINUED | OUTPATIENT
Start: 2018-12-16 | End: 2018-12-16 | Stop reason: HOSPADM

## 2018-12-16 RX ORDER — ONDANSETRON 2 MG/ML
4 INJECTION INTRAMUSCULAR; INTRAVENOUS EVERY 6 HOURS PRN
Status: DISCONTINUED | OUTPATIENT
Start: 2018-12-16 | End: 2018-12-16 | Stop reason: HOSPADM

## 2018-12-16 RX ADMIN — ACETAMINOPHEN 650 MG: 325 TABLET, FILM COATED ORAL at 08:39

## 2018-12-16 NOTE — DISCHARGE SUMMARY
Discharge Summary - [facility]    Patient Information: Kana Harvey 47 y o  female MRN: [id number]  Unit/Bed#: -01 Encounter: [id number]    Discharging Physician / Practitioner: Mack Arroyo MD  PCP: Mali Kowalski DO  Admission Date: 12/15/2018  Discharge Date: 12/16/18    Disposition:     Home    Reason for Admission:  Chest pain    Discharge Diagnoses:     Principal Problem:    Chest pain  Resolved Problems:    * No resolved hospital problems  *      Consultations During Hospital Stay:  · None    Procedures Performed:     · None    Significant Findings / Test Results:     · None    Hospital Course:     Kana Harvey is a 47 y o  female patient who originally presented to the hospital on 12/15/2018 due to chest pain  She reported sudden onset of left-sided chest pain that radiated into her shoulder and back and left side of the neck  She denied any exertional symptoms  Denied nausea vomiting or diaphoresis  She was observed overnight in the hospital   Three sets of cardiac enzymes were negative  Patient is being discharged with outpatient stress test     Condition at Discharge: good     Discharge Day Visit / Exam:     Subjective:  No chest pain  Anxious to go home  Vitals: Blood Pressure: 114/64 (12/16/18 0823)  Pulse: 67 (12/16/18 0823)  Temperature: 98 2 °F (36 8 °C) (12/16/18 0823)  Temp Source: Oral (12/16/18 0823)  Respirations: 16 (12/16/18 0823)  Height: 5' 2" (157 5 cm) (12/15/18 2243)  Weight - Scale: 104 kg (228 lb 2 8 oz) (12/15/18 2243)  SpO2: 96 % (12/16/18 0823)  Exam:   Physical Exam     Gen -Patient comfortable at rest  Neck- Supple  No thyromegaly or lymphadenopathy  Lungs-Clear bilaterally without any wheeze or rales   Heart S1-S2, regular rate and rhythm, no murmurs  Abdomen-soft nontender, no organomegaly   Bowel sounds present  Extremities-no cyanosi,  clubbing or edema  Skin- no rash  Neuro-nonfocal       Discussion with Family:    Discharge instructions/Information to patient and family:   See after visit summary for information provided to patient and family  Provisions for Follow-Up Care:  See after visit summary for information related to follow-up care and any pertinent home health orders  Planned Readmission: no     Discharge Statement:  I spent 35 minutes discharging the patient  This time was spent on the day of discharge  I had direct contact with the patient on the day of discharge  Greater than 50% of the total time was spent examining patient, answering all patient questions, arranging and discussing plan of care with patient as well as directly providing post-discharge instructions  Additional time then spent on discharge activities  Discharge Medications:  See after visit summary for reconciled discharge medications provided to patient and family        ** Please Note: This note has been constructed using a voice recognition system **

## 2018-12-16 NOTE — ED PROVIDER NOTES
History  Chief Complaint   Patient presents with    Chest Pain     Patient presents with chest pain started at home while cooking  Per EMS, patient given 1 nitro and aspirin  patient presents to ED stating pain has resolved     Patient presents to the emergency department via ambulance with onset of chest pain described as a pressure shooting up to her neck and down her arm  She does not have a cardiac history  She was given aspirin and sublingual nitroglycerin by the medics in route which resolved her symptoms  She denies current complaints  She denies fever chills cough  Denies trauma fall or injury  Denies back or belly pain  Prior to Admission Medications   Prescriptions Last Dose Informant Patient Reported? Taking? Multiple Vitamin (MULTIVITAMIN) capsule   Yes No   Sig: Take 1 capsule by mouth daily   acetaminophen (TYLENOL) 325 mg tablet  Self Yes No   Sig: Take 650 mg by mouth every 6 (six) hours as needed for mild pain      Facility-Administered Medications: None       Past Medical History:   Diagnosis Date    Bright red rectal bleeding     last assessed 4/15/15    Chronic constipation     last assessed 4/15/15     Hematuria     last assessed 4/6/15     Iron deficiency anemia     resolved 16        Past Surgical History:   Procedure Laterality Date     SECTION      CYSTOSCOPY  2015    diagnostic / Managed by Roby Laser / resolved 7/29/15     HYSTERECTOMY         Family History   Problem Relation Age of Onset    Colon cancer Mother     Hypertension Mother     Hypothyroidism Mother     Prostate cancer Father      I have reviewed and agree with the history as documented  Social History   Substance Use Topics    Smoking status: Former Smoker    Smokeless tobacco: Former User      Comment: never a smoker per allscript     Alcohol use No      Comment: non drinker/ no alcohol use         Review of Systems   Constitutional: Negative    Negative for activity change, appetite change, chills, diaphoresis, fatigue and fever  HENT: Negative  Negative for congestion, drooling, trouble swallowing and voice change  Eyes: Negative  Negative for photophobia and visual disturbance  Respiratory: Negative  Negative for cough, choking, shortness of breath, wheezing and stridor  Cardiovascular: Positive for chest pain  Negative for palpitations and leg swelling  Gastrointestinal: Negative  Negative for abdominal pain, diarrhea, nausea and rectal pain  Endocrine: Negative  Genitourinary: Negative  Negative for difficulty urinating, dysuria, frequency, hematuria, urgency, vaginal bleeding, vaginal discharge and vaginal pain  Musculoskeletal: Negative  Negative for back pain, myalgias and neck pain  Skin: Negative  Negative for rash and wound  Allergic/Immunologic: Negative  Neurological: Negative  Negative for dizziness, tremors, seizures, syncope, facial asymmetry, speech difficulty, weakness, light-headedness, numbness and headaches  Hematological: Negative  Does not bruise/bleed easily  Psychiatric/Behavioral: Negative  Negative for confusion  Physical Exam  Physical Exam   Constitutional: She is oriented to person, place, and time  She appears well-developed and well-nourished  HENT:   Head: Normocephalic and atraumatic  Right Ear: External ear normal    Left Ear: External ear normal    Mouth/Throat: Oropharynx is clear and moist    Eyes: Pupils are equal, round, and reactive to light  Conjunctivae and EOM are normal  Right eye exhibits no discharge  Left eye exhibits no discharge  Neck: Normal range of motion  Neck supple  Cardiovascular: Normal rate, regular rhythm, normal heart sounds and intact distal pulses  Pulmonary/Chest: Effort normal and breath sounds normal  No respiratory distress  She has no wheezes  She has no rales  She exhibits no tenderness  Abdominal: Soft   Bowel sounds are normal  She exhibits no distension and no mass  There is no tenderness  There is no rebound and no guarding  No hernia  Musculoskeletal: Normal range of motion  She exhibits no edema, tenderness or deformity  Neurological: She is alert and oriented to person, place, and time  She has normal reflexes  She displays normal reflexes  No cranial nerve deficit or sensory deficit  She exhibits normal muscle tone  Coordination normal    Skin: Skin is warm and dry  No rash noted  No erythema  No pallor  Psychiatric: She has a normal mood and affect  Her behavior is normal  Judgment and thought content normal    Nursing note and vitals reviewed        Vital Signs  ED Triage Vitals   Temperature Pulse Respirations Blood Pressure SpO2   12/15/18 2000 12/15/18 2000 12/15/18 2000 12/15/18 2000 12/15/18 2000   98 1 °F (36 7 °C) 65 16 126/77 98 %      Temp Source Heart Rate Source Patient Position - Orthostatic VS BP Location FiO2 (%)   12/15/18 2000 12/15/18 2000 12/15/18 2243 12/15/18 2000 --   Oral Monitor Lying Right arm       Pain Score       12/15/18 2000       2           Vitals:    12/15/18 2000 12/15/18 2127 12/15/18 2243   BP: 126/77 129/70 119/77   Pulse: 65 67 75   Patient Position - Orthostatic VS:   Lying       Visual Acuity      ED Medications  Medications   nitroglycerin (NITRO-BID) 2 % TD ointment 1 inch (1 inch Topical Given 12/15/18 2135)       Diagnostic Studies  Results Reviewed     Procedure Component Value Units Date/Time    Troponin I [[id number]]  (Normal) Collected:  12/15/18 2035    Lab Status:  Final result Specimen:  Blood from Arm, Right Updated:  12/15/18 2101     Troponin I <0 02 ng/mL     Protime-INR [[id number]]  (Normal) Collected:  12/15/18 2035    Lab Status:  Final result Specimen:  Blood from Arm, Right Updated:  12/15/18 2053     Protime 12 2 seconds      INR 0 93    APTT [[id number]]  (Normal) Collected:  12/15/18 2035    Lab Status:  Final result Specimen:  Blood from Arm, Right Updated:  12/15/18 2053     PTT 27 seconds     Basic metabolic panel [[id number]] Collected:  12/15/18 2035    Lab Status:  Final result Specimen:  Blood from Arm, Right Updated:  12/15/18 2053     Sodium 143 mmol/L      Potassium 3 7 mmol/L      Chloride 105 mmol/L      CO2 31 mmol/L      ANION GAP 7 mmol/L      BUN 13 mg/dL      Creatinine 1 00 mg/dL      Glucose 87 mg/dL      Calcium 9 2 mg/dL      eGFR 75 ml/min/1 73sq m     Narrative:         National Kidney Disease Education Program recommendations are as follows:  GFR calculation is accurate only with a steady state creatinine  Chronic Kidney disease less than 60 ml/min/1 73 sq  meters  Kidney failure less than 15 ml/min/1 73 sq  meters      CBC and differential [[id number]]  (Abnormal) Collected:  12/15/18 2035    Lab Status:  Final result Specimen:  Blood from Arm, Right Updated:  12/15/18 2042     WBC 10 01 Thousand/uL      RBC 4 63 Million/uL      Hemoglobin 13 6 g/dL      Hematocrit 41 6 %      MCV 90 fL      MCH 29 4 pg      MCHC 32 7 g/dL      RDW 13 2 %      MPV 10 5 fL      Platelets 632 Thousands/uL      nRBC 0 /100 WBCs      Neutrophils Relative 37 (L) %      Immat GRANS % 0 %      Lymphocytes Relative 54 (H) %      Monocytes Relative 6 %      Eosinophils Relative 2 %      Basophils Relative 1 %      Neutrophils Absolute 3 70 Thousands/µL      Immature Grans Absolute 0 03 Thousand/uL      Lymphocytes Absolute 5 48 (H) Thousands/µL      Monocytes Absolute 0 59 Thousand/µL      Eosinophils Absolute 0 15 Thousand/µL      Basophils Absolute 0 06 Thousands/µL                  No orders to display              Procedures  ECG 12 Lead Documentation  Date/Time: 12/15/2018 9:12 PM  Performed by: Donna Lambert  Authorized by: Donna Lambert     ECG reviewed by me, the ED Provider: yes    Patient location:  ED  Previous ECG:     Previous ECG:  Compared to current    Similarity:  No change  Interpretation:     Interpretation: normal    Rate:     ECG rate:  63    ECG rate assessment: normal Rhythm:     Rhythm: sinus rhythm    Ectopy:     Ectopy: none    QRS:     QRS axis:  Normal    QRS intervals:  Normal  Conduction:     Conduction: normal    ST segments:     ST segments:  Normal  T waves:     T waves: normal             Phone Contacts  ED Phone Contact    ED Course         HEART Risk Score      Most Recent Value   History  1 Filed at: 12/15/2018 2118   ECG  1 Filed at: 12/15/2018 2118   Age  1 Filed at: 12/15/2018 2118   Risk Factors  0 Filed at: 12/15/2018 2118   Troponin  0 Filed at: 12/15/2018 2118   Heart Score Risk Calculator   History  1 Filed at: 12/15/2018 2118   ECG  1 Filed at: 12/15/2018 2118   Age  1 Filed at: 12/15/2018 2118   Risk Factors  0 Filed at: 12/15/2018 2118   Troponin  0 Filed at: 12/15/2018 2118   HEART Score  3 Filed at: 12/15/2018 2118   HEART Score  3 Filed at: 12/15/2018 2118                            MDM  CritCare Time    Disposition  Final diagnoses:   Chest pain     Time reflects when diagnosis was documented in both MDM as applicable and the Disposition within this note     Time User Action Codes Description Comment    12/15/2018  9:19 PM Lilly Matute Add [R07 9] Chest pain       ED Disposition     ED Disposition Condition Comment    Admit  Case was discussed with Jennifer and the patient's admission status was agreed to be Admission Status: observation status to the service of Dr Mohit Ulloa    None         Current Discharge Medication List      CONTINUE these medications which have NOT CHANGED    Details   acetaminophen (TYLENOL) 325 mg tablet Take 650 mg by mouth every 6 (six) hours as needed for mild pain      Multiple Vitamin (MULTIVITAMIN) capsule Take 1 capsule by mouth daily           No discharge procedures on file      ED Provider  Electronically Signed by           Claire Groves MD  12/15/18 3292

## 2018-12-16 NOTE — ASSESSMENT & PLAN NOTE
· Resolved,currently asymptomatic, JONATHAN score 0  · ED provider felt history prompts ACS r/o  · Initial trop negative, EKG unremarkable  · CBC without leukocytosis, afebrile  · Trend troponins, monitor on tele  · If above workup negative, can f/u as outpatient for stress test

## 2018-12-16 NOTE — NURSING NOTE
Pt refusing IV insertion, educated on the need due to intermittent chest pain and for immediate access in an emergency  Pt states " I do not see it as a need at this time, I'm going to refuse that one"  Charge RN aware, HAYLEY will be notified

## 2018-12-16 NOTE — H&P
H&P- Conchita Lr 1965, 47 y o  female MRN: [id number]    Unit/Bed#: -01 Encounter: [id number]    Primary Care Provider: Rosemary De La Vega DO   Date and time admitted to hospital: 12/15/2018  7:53 PM    * Chest pain   Assessment & Plan    · Resolved,currently asymptomatic, JONATHAN score 0  · ED provider felt history prompts ACS r/o  · Initial trop negative, EKG unremarkable  · CBC without leukocytosis, afebrile  · Trend troponins, monitor on tele  · If above workup negative, can f/u as outpatient for stress test        VTE Prophylaxis: low risk, ambulate  / sequential compression device   Code Status: FULL  POLST: POLST form is not discussed and not completed at this time  Discussion with family: none    Anticipated Length of Stay:  Patient will be admitted on an Observation basis with an anticipated length of stay of  < 2 midnights  Justification for Hospital Stay: ACS r/o    Total Time for Visit, including Counseling / Coordination of Care: 30 minutes  Greater than 50% of this total time spent on direct patient counseling and coordination of care  Chief Complaint:   Chest pain    History of Present Illness:    Conchita Lr is a 47 y o  female who presents with chest pain  Patient is somewhat uncooperative upon interview  Patient states that around 7 this evening while she was cooking, she developed sudden onset left-sided anterior chest pain that radiated into her upper shoulder/upper back as well as into the left side of her neck  She also reports shortness of breath with this episode as well as some tingling sensation in her shoulders  She denies any radiation into her arm  She admits to some nausea during this episode as well  Admits to some headache as well  Denies any dizziness or lightheadedness, denies syncope  Denies palpitations  Currently her all of her pain and symptoms have resolved  Denies vomiting, abdominal pain, diarrhea  Denies cough or congestion  Denies fever or chills  Denies urinary symptoms  Denies dyspnea on exertion or lower extremity edema  Denies recent travel  Denies eating anything unusual, or anything spicy/acidic today  Denies any alleviating factors  Review of Systems:    Review of Systems   Constitutional: Negative  HENT: Negative  Eyes: Negative  Respiratory: Positive for shortness of breath  Cardiovascular: Positive for chest pain  Gastrointestinal: Positive for nausea  Endocrine: Negative  Genitourinary: Negative  Musculoskeletal: Positive for back pain and neck pain  Skin: Negative  Allergic/Immunologic: Negative  Neurological: Positive for dizziness  Hematological: Negative  Psychiatric/Behavioral: Negative  Past Medical and Surgical History:     Past Medical History:   Diagnosis Date    Bright red rectal bleeding     last assessed 4/15/15    Chronic constipation     last assessed 4/15/15     Hematuria     last assessed 4/6/15     Iron deficiency anemia     resolved 16        Past Surgical History:   Procedure Laterality Date     SECTION      CYSTOSCOPY  2015    diagnostic / Managed by Mel Sim / resolved 7/29/15     HYSTERECTOMY         Meds/Allergies:    Prior to Admission medications    Medication Sig Start Date End Date Taking? Authorizing Provider   acetaminophen (TYLENOL) 325 mg tablet Take 650 mg by mouth every 6 (six) hours as needed for mild pain    Historical Provider, MD   Multiple Vitamin (MULTIVITAMIN) capsule Take 1 capsule by mouth daily    Historical Provider, MD     I have reviewed home medications with patient personally  Allergies:    Allergies   Allergen Reactions    Amoxapine And Related     Amoxicillin Swelling       Social History:     Marital Status: /Civil Union   Occupation: not discussed  Patient Pre-hospital Living Situation: home  Patient Pre-hospital Level of Mobility: independent  Patient Pre-hospital Diet Restrictions: none  Substance Use History:   History   Alcohol Use No     Comment: non drinker/ no alcohol use      History   Smoking Status    Former Smoker   Smokeless Tobacco    Former User     Comment: never a smoker per allscript      History   Drug Use No       Family History:    non-contributory    Physical Exam:     Vitals:   Blood Pressure: 119/77 (12/15/18 2243)  Pulse: 75 (12/15/18 2243)  Temperature: 98 4 °F (36 9 °C) (12/15/18 2243)  Temp Source: Oral (12/15/18 2243)  Respirations: 16 (12/15/18 2243)  Height: 5' 2" (157 5 cm) (12/15/18 2243)  Weight - Scale: 104 kg (228 lb 2 8 oz) (12/15/18 2243)  SpO2: 95 % (12/15/18 2243)    Physical Exam   Constitutional: She appears well-developed and well-nourished  No distress  HENT:   Head: Normocephalic  Mouth/Throat: Oropharynx is clear and moist    Cardiovascular: Normal rate, regular rhythm, normal heart sounds and intact distal pulses  Exam reveals no gallop and no friction rub  No murmur heard  Pulmonary/Chest: Effort normal and breath sounds normal  No respiratory distress  She has no wheezes  She has no rales  She exhibits no tenderness  Abdominal: Soft  Bowel sounds are normal  She exhibits no distension and no mass  There is no tenderness  There is no rebound and no guarding  Musculoskeletal: She exhibits no edema or tenderness  Neurological: She is alert  Skin: Skin is warm and dry  No rash noted  She is not diaphoretic  No erythema  No pallor  Psychiatric: She has a normal mood and affect  Nursing note and vitals reviewed  Additional Data:     Lab Results: I have personally reviewed pertinent reports          Results from last 7 days  Lab Units 12/15/18  2035   WBC Thousand/uL 10 01   HEMOGLOBIN g/dL 13 6   HEMATOCRIT % 41 6   PLATELETS Thousands/uL 250   NEUTROS PCT % 37*   LYMPHS PCT % 54*   MONOS PCT % 6   EOS PCT % 2       Results from last 7 days  Lab Units 12/15/18  2035   SODIUM mmol/L 143   POTASSIUM mmol/L 3 7 CHLORIDE mmol/L 105   CO2 mmol/L 31   BUN mg/dL 13   CREATININE mg/dL 1 00   ANION GAP mmol/L 7   CALCIUM mg/dL 9 2   GLUCOSE RANDOM mg/dL 87       Results from last 7 days  Lab Units 12/15/18  2035   INR  0 93                   Imaging: I have personally reviewed pertinent reports  No orders to display       EKG, Pathology, and Other Studies Reviewed on Admission:   · EKG: NSR    Allscripts / Epic Records Reviewed: Yes     ** Please Note: This note has been constructed using a voice recognition system   **

## 2018-12-16 NOTE — UTILIZATION REVIEW
Initial Clinical Review    Admission: Date/Time/Statement: 12/15/2018  2121 OBSERVATION     Orders Placed This Encounter   Procedures    Place in Observation (expected length of stay for this patient is less than two midnights)     Standing Status:   Standing     Number of Occurrences:   1     Order Specific Question:   Admitting Physician     Answer:   Yolande Brown, [address]     Order Specific Question:   Level of Care     Answer:   Med Surg [16]         ED: Date/Time/Mode of Arrival:   ED Arrival Information     Expected Arrival Acuity Means of Arrival Escorted By Service Admission Type    - 12/15/2018 19:53 Urgent Ambulance [facility] General Medicine Urgent    Arrival Complaint    -          Chief Complaint:   Chief Complaint   Patient presents with    Chest Pain     Patient presents with chest pain started at home while cooking  Per EMS, patient given 1 nitro and aspirin  patient presents to ED stating pain has resolved       History of Illness: 47 y o  female who presents with chest pain  Patient is somewhat uncooperative upon interview  Patient states that around 7 this evening while she was cooking, she developed sudden onset left-sided anterior chest pain that radiated into her upper shoulder/upper back as well as into the left side of her neck  She also reports shortness of breath with this episode as well as some tingling sensation in her shoulders  She admits to some nausea during this episode as well  Admits to some headache as well  Currently her all of her pain and symptoms have resolved        ED Vital Signs:   ED Triage Vitals   Temperature Pulse Respirations Blood Pressure SpO2   12/15/18 2000 12/15/18 2000 12/15/18 2000 12/15/18 2000 12/15/18 2000   98 1 °F (36 7 °C) 65 16 126/77 98 %      Temp Source Heart Rate Source Patient Position - Orthostatic VS BP Location FiO2 (%)   12/15/18 2000 12/15/18 2000 12/15/18 2243 12/15/18 2000 --   Oral Monitor Lying Right arm Pain Score       12/15/18 2000       2        Wt Readings from Last 1 Encounters:   12/15/18 104 kg (228 lb 2 8 oz)       Vital Signs (abnormal): none  Heart risk score 3    Abnormal Labs/Diagnostic Test Results:   Troponin negative x 2 thus far    ED Treatment:   Medication Administration from 12/15/2018 1953 to 12/15/2018 2240       Date/Time Order Dose Route Action Comments     12/15/2018 2135 nitroglycerin (NITRO-BID) 2 % TD ointment 1 inch 1 inch Topical Given           Past Medical/Surgical History:   Past Medical History:   Diagnosis Date    Bright red rectal bleeding     Chronic constipation     Hematuria     Iron deficiency anemia        Admitting Diagnosis: Chest pain [R07 9]    Age/Sex: 47 y o  female    Assessment/Plan: this is a 47year old female from home with past medical history of obesity and anemia who presents to ED with chest pain  Heart score in ED 3  Patient is admitted observation to r/o ACS and plan includes serial troponin and telemetry  Admission Orders:  12/15/2018  2121 OBSERVATION  Scheduled Meds:   Current Facility-Administered Medications:  acetaminophen 650 mg Oral Q4H PRN   ondansetron 4 mg Intravenous Q6H PRN     Continuous Infusions:    PRN Meds: not used    OTHER ORDERS: telemetry  [facility] Utilization Review Department  Phone: [phone]; Fax [phone]  [email or web address]  ATTENTION: Please call with any questions or concerns to [phone]  and carefully listen to the prompts so that you are directed to the right person  Send all requests for admission clinical reviews, approved or denied determinations and any other requests to fax [phone]   All voicemails are confidential

## 2018-12-16 NOTE — PROGRESS NOTES
Patient refused to have IV insertion at this time  Patient made aware of the risks of not have IV access at this time  Dr El Padilla from [facility] made aware at this time

## 2018-12-17 ENCOUNTER — TRANSITIONAL CARE MANAGEMENT (OUTPATIENT)
Dept: FAMILY MEDICINE CLINIC | Facility: CLINIC | Age: 53
End: 2018-12-17

## 2018-12-18 ENCOUNTER — HOSPITAL ENCOUNTER (OUTPATIENT)
Dept: NON INVASIVE DIAGNOSTICS | Facility: CLINIC | Age: 53
Discharge: HOME | End: 2018-12-18
Payer: COMMERCIAL

## 2018-12-18 DIAGNOSIS — R07.9 CHEST PAIN: ICD-10-CM

## 2018-12-18 LAB
ATRIAL RATE: 63 BPM
P AXIS: 63 DEGREES
PR INTERVAL: 202 MS
QRS AXIS: 44 DEGREES
QRSD INTERVAL: 80 MS
QT INTERVAL: 388 MS
QTC INTERVAL: 397 MS
T WAVE AXIS: 38 DEGREES
VENTRICULAR RATE: 63 BPM

## 2018-12-18 PROCEDURE — 93016 CV STRESS TEST SUPVJ ONLY: CPT | Performed by: INTERNAL MEDICINE

## 2018-12-18 PROCEDURE — A9502 TC99M TETROFOSMIN: HCPCS

## 2018-12-18 PROCEDURE — 93017 CV STRESS TEST TRACING ONLY: CPT

## 2018-12-18 PROCEDURE — 93010 ELECTROCARDIOGRAM REPORT: CPT | Performed by: INTERNAL MEDICINE

## 2018-12-18 PROCEDURE — 93018 CV STRESS TEST I&R ONLY: CPT | Performed by: INTERNAL MEDICINE

## 2018-12-18 PROCEDURE — 78452 HT MUSCLE IMAGE SPECT MULT: CPT

## 2018-12-18 PROCEDURE — 78452 HT MUSCLE IMAGE SPECT MULT: CPT | Performed by: INTERNAL MEDICINE

## 2018-12-19 ENCOUNTER — OFFICE VISIT (OUTPATIENT)
Dept: FAMILY MEDICINE CLINIC | Facility: CLINIC | Age: 53
End: 2018-12-19
Payer: COMMERCIAL

## 2018-12-19 VITALS
HEART RATE: 76 BPM | TEMPERATURE: 96.5 F | WEIGHT: 223 LBS | BODY MASS INDEX: 41.04 KG/M2 | SYSTOLIC BLOOD PRESSURE: 120 MMHG | DIASTOLIC BLOOD PRESSURE: 90 MMHG | RESPIRATION RATE: 16 BRPM | HEIGHT: 62 IN

## 2018-12-19 DIAGNOSIS — E66.01 MORBID OBESITY (HCC): ICD-10-CM

## 2018-12-19 DIAGNOSIS — R10.13 DYSPEPSIA: Primary | ICD-10-CM

## 2018-12-19 PROBLEM — R07.9 CHEST PAIN: Status: RESOLVED | Noted: 2018-12-16 | Resolved: 2018-12-19

## 2018-12-19 PROCEDURE — 99496 TRANSJ CARE MGMT HIGH F2F 7D: CPT | Performed by: FAMILY MEDICINE

## 2018-12-19 NOTE — PROGRESS NOTES
Assessment/Plan:    No problem-specific Assessment & Plan notes found for this encounter  If any mild sx in future recur, consider EGD/GI evaluation  Cardiac results and stress test reviewed  bmi advised  Low acid diet    Form for "clearance" for exercise/wt loss given     Diagnoses and all orders for this visit:    Dyspepsia    BMI 40 0-44 9, adult ([facility] [address] )    Morbid obesity ([facility] [address] )    Other orders  -     Probiotic Product (PROBIOTIC-10 PO); Take by mouth              No Follow-up on file  Subjective:      Patient ID: Roya Covington is a 47 y o  female  Chief Complaint   Patient presents with    Transition of Care Management       HPI  Stress test normal   Reviewed with pt  3d since DC  No gi symptoms have recurred  Does like onions  No bowel changes    TCM Call (since 11/18/2018)     Date and time call was made  12/17/2018 11:29 AM    Hospital care reviewed  Records reviewed    Patient was hospitialized at  [address] [place] [address]    Date of Admission  12/15/18    Date of discharge  12/16/18    Diagnosis  Chest Pain    Disposition  Home    Were the patients medications reviewed and updated  Yes    Current Symptoms  -- (Has mild pain in right chest, mid to lower back pain on the right side which she feels is "gas" She is at work and is fine  Her left sided chest pain has subsided  )          The following portions of the patient's history were reviewed and updated as appropriate: allergies, current medications, past family history, past medical history, past social history, past surgical history and problem list     Review of Systems   Gastrointestinal: Negative for blood in stool, nausea and vomiting           Current Outpatient Prescriptions   Medication Sig Dispense Refill    acetaminophen (TYLENOL) 325 mg tablet Take 650 mg by mouth every 6 (six) hours as needed for mild pain      Multiple Vitamin (MULTIVITAMIN) capsule Take 1 capsule by mouth daily      Probiotic Product (PROBIOTIC-10 PO) Take by mouth No current facility-administered medications for this visit  Objective:    /90   Pulse 76   Temp (!) 96 5 °F (35 8 °C)   Resp 16   Ht 5' 2" (1 575 m)   Wt 101 kg (223 lb)   BMI 40 79 kg/m²        Physical Exam   Constitutional: She appears well-developed  No distress  HENT:   Head: Normocephalic  Mouth/Throat: No oropharyngeal exudate  Eyes: Conjunctivae are normal  No scleral icterus  Neck: Neck supple  Cardiovascular: Normal rate and intact distal pulses  No murmur heard  Pulmonary/Chest: Effort normal  No respiratory distress  She has no wheezes  Abdominal: Soft  There is no rebound and no guarding  Musculoskeletal: She exhibits no edema or deformity  Neurological: She is alert  Skin: Skin is warm and dry  No rash noted  No pallor  Psychiatric: Her behavior is normal  Thought content normal    Nursing note and vitals reviewed               Heidy Motley DO

## 2018-12-19 NOTE — LETTER
December 19, 2018     Patient: Erin Renee   YOB: 1965   Date of Visit: 12/19/2018       To Whom it May Concern:    Erin Renee is under my professional care  She was seen in my office on 12/19/2018  She is medically cleared for any activity  She is in good cardiovascular health and has no restrictions  If you have any questions or concerns, please don't hesitate to call           Sincerely,          Tiffany Smith DO        CC: No Recipients

## 2018-12-20 ENCOUNTER — TELEPHONE (OUTPATIENT)
Dept: FAMILY MEDICINE CLINIC | Facility: CLINIC | Age: 53
End: 2018-12-20

## 2018-12-20 LAB
CHEST PAIN STATEMENT: NORMAL
MAX DIASTOLIC BP: 90 MMHG
MAX HEART RATE: 176 BPM
MAX PREDICTED HEART RATE: 168 BPM
MAX. SYSTOLIC BP: 180 MMHG
PROTOCOL NAME: NORMAL
REASON FOR TERMINATION: NORMAL
TARGET HR FORMULA: NORMAL
TEST INDICATION: NORMAL
TIME IN EXERCISE PHASE: NORMAL

## 2018-12-20 NOTE — TELEPHONE ENCOUNTER
Costa Salas wants an order from Dr Jovani Nettles to go to   [facility]    An order for weight loss program   An order so they know that Dr Jovani Nettles agrees with this    C[place] Salas [phone]

## 2018-12-20 NOTE — TELEPHONE ENCOUNTER
I cannot write an order/letter to go to any program or facility that I have no knowledge of since I cannot accept any risks of what will happen there  (I am not willing to review any information from the program either)    The best I can do is write a letter (in addition to the one I wrote at the last visit) stating that weight loss has been suggested to improve her general health  Letter printed

## 2018-12-21 ENCOUNTER — TELEPHONE (OUTPATIENT)
Dept: FAMILY MEDICINE CLINIC | Facility: CLINIC | Age: 53
End: 2018-12-21

## 2019-05-02 ENCOUNTER — TELEPHONE (OUTPATIENT)
Dept: BARIATRICS | Facility: CLINIC | Age: 54
End: 2019-05-02

## 2019-05-08 ENCOUNTER — OFFICE VISIT (OUTPATIENT)
Dept: BARIATRICS | Facility: CLINIC | Age: 54
End: 2019-05-08
Payer: COMMERCIAL

## 2019-05-08 VITALS
HEART RATE: 70 BPM | WEIGHT: 215 LBS | SYSTOLIC BLOOD PRESSURE: 118 MMHG | DIASTOLIC BLOOD PRESSURE: 78 MMHG | RESPIRATION RATE: 16 BRPM | HEIGHT: 62 IN | BODY MASS INDEX: 39.56 KG/M2 | TEMPERATURE: 98.5 F

## 2019-05-08 DIAGNOSIS — E66.01 SEVERE OBESITY (HCC): Primary | ICD-10-CM

## 2019-05-08 PROCEDURE — 99204 OFFICE O/P NEW MOD 45 MIN: CPT | Performed by: PHYSICIAN ASSISTANT

## 2019-05-08 RX ORDER — FLUTICASONE PROPIONATE 50 MCG
1 SPRAY, SUSPENSION (ML) NASAL AS NEEDED
COMMUNITY
End: 2020-06-12 | Stop reason: SDUPTHER

## 2019-07-02 ENCOUNTER — TELEPHONE (OUTPATIENT)
Dept: FAMILY MEDICINE CLINIC | Facility: CLINIC | Age: 54
End: 2019-07-02

## 2019-07-02 ENCOUNTER — OFFICE VISIT (OUTPATIENT)
Dept: FAMILY MEDICINE CLINIC | Facility: CLINIC | Age: 54
End: 2019-07-02
Payer: COMMERCIAL

## 2019-07-02 VITALS
TEMPERATURE: 98.1 F | HEIGHT: 62 IN | BODY MASS INDEX: 41.66 KG/M2 | WEIGHT: 226.4 LBS | HEART RATE: 74 BPM | SYSTOLIC BLOOD PRESSURE: 118 MMHG | DIASTOLIC BLOOD PRESSURE: 62 MMHG | RESPIRATION RATE: 18 BRPM

## 2019-07-02 DIAGNOSIS — R10.13 DYSPEPSIA: ICD-10-CM

## 2019-07-02 DIAGNOSIS — R07.9 CHEST PAIN, UNSPECIFIED TYPE: Primary | ICD-10-CM

## 2019-07-02 PROCEDURE — 99214 OFFICE O/P EST MOD 30 MIN: CPT | Performed by: FAMILY MEDICINE

## 2019-07-02 PROCEDURE — 93000 ELECTROCARDIOGRAM COMPLETE: CPT | Performed by: FAMILY MEDICINE

## 2019-07-02 PROCEDURE — 1036F TOBACCO NON-USER: CPT | Performed by: FAMILY MEDICINE

## 2019-07-02 PROCEDURE — 3008F BODY MASS INDEX DOCD: CPT | Performed by: FAMILY MEDICINE

## 2019-07-02 RX ORDER — RANITIDINE 150 MG/1
150 TABLET ORAL 2 TIMES DAILY
Qty: 60 TABLET | Refills: 1 | Status: SHIPPED | OUTPATIENT
Start: 2019-07-02 | End: 2019-11-13

## 2019-07-02 NOTE — TELEPHONE ENCOUNTER
Patient stated that she has been having chest pain and shoulder pain for the past few days and yesterday it lasted all day  She stated that she has been having nausea and dizziness  Patient denies headaches, numbness in extremities  I told the patient to go to the emergency room and she refuses to go  She stated that she has gone before to the emergency room  and they never find anything wrong  She states that she does have a stressful job and she started working out 2 weeks ago and she just wants to get checked out  And if the Dr Avery Tompkins says to go then she will go to the emergency room  appt made with Dr Irina Magana at 6pm today      Carlos Verdugo MA

## 2019-07-02 NOTE — PROGRESS NOTES
Assessment/Plan:    No problem-specific Assessment & Plan notes found for this encounter     ekg ok  CP suspect GI cause, NM stress tests neg  Start H2b  GI consult, consider EGD  Diet advised    bmi aware  BMI Counseling: Body mass index is 42 09 kg/m²  Discussed the patient's BMI with her  The BMI is above average  BMI counseling and education was provided to the patient  Nutrition recommendations include decreasing overall calorie intake  Diagnoses and all orders for this visit:    Chest pain, unspecified type  -     POCT ECG  -     ranitidine (ZANTAC) 150 mg tablet; Take 1 tablet (150 mg total) by mouth 2 (two) times a day  -     Ambulatory referral to Gastroenterology; Future    Dyspepsia  -     ranitidine (ZANTAC) 150 mg tablet; Take 1 tablet (150 mg total) by mouth 2 (two) times a day  -     Ambulatory referral to Gastroenterology; Future        Return if symptoms worsen or fail to improve  Subjective:      Patient ID: Nicolas Rob is a 47 y o  female  Chief Complaint   Patient presents with    Chest Pain     x4 days  on and off  [facility]       HPI  Had normal stress test NM in Dec 2018  EKG normal today  Had cp today  On/off  Tightness  From neck to chest  Radiates to shoulder  Not with neck movt  Not dizzy  Gets nausea  No vomit  No sob  Does have some food triggers  Started exercising  No sx with exercise  No probs since dec until about 5 days ago    The following portions of the patient's history were reviewed and updated as appropriate: allergies, current medications, past family history, past medical history, past social history, past surgical history and problem list     Review of Systems   Gastrointestinal: Negative for abdominal pain and blood in stool           Current Outpatient Medications   Medication Sig Dispense Refill    acetaminophen (TYLENOL) 325 mg tablet Take 650 mg by mouth every 6 (six) hours as needed for mild pain      fluticasone (FLONASE) 50 mcg/act nasal spray 1 spray into each nostril as needed for rhinitis      Multiple Vitamin (MULTIVITAMIN) capsule Take 1 capsule by mouth daily      Probiotic Product (PROBIOTIC-10 PO) Take by mouth as needed       ranitidine (ZANTAC) 150 mg tablet Take 1 tablet (150 mg total) by mouth 2 (two) times a day 60 tablet 1     No current facility-administered medications for this visit  Objective:    /62   Pulse 74   Temp 98 1 °F (36 7 °C)   Resp 18   Ht 5' 1 5" (1 562 m)   Wt 103 kg (226 lb 6 4 oz)   BMI 42 09 kg/m²        Physical Exam   Constitutional: She appears well-developed  No distress  HENT:   Head: Normocephalic  Mouth/Throat: No oropharyngeal exudate  Eyes: Conjunctivae are normal  No scleral icterus  Neck: Neck supple  Cardiovascular: Normal rate, normal heart sounds and intact distal pulses  No murmur heard  Pulmonary/Chest: Effort normal and breath sounds normal  No respiratory distress  She has no wheezes  She has no rales  Abdominal: Soft  Bowel sounds are normal  She exhibits no distension  Musculoskeletal: She exhibits no edema or deformity  Neurological: She is alert  Skin: Skin is warm and dry  No rash noted  No pallor  Psychiatric: Her behavior is normal  Thought content normal    Nursing note and vitals reviewed               Cristian Moore DO

## 2019-11-13 ENCOUNTER — OFFICE VISIT (OUTPATIENT)
Dept: FAMILY MEDICINE CLINIC | Facility: CLINIC | Age: 54
End: 2019-11-13
Payer: COMMERCIAL

## 2019-11-13 VITALS
HEART RATE: 82 BPM | BODY MASS INDEX: 41.96 KG/M2 | TEMPERATURE: 96.7 F | WEIGHT: 228 LBS | SYSTOLIC BLOOD PRESSURE: 124 MMHG | HEIGHT: 62 IN | RESPIRATION RATE: 18 BRPM | OXYGEN SATURATION: 97 % | DIASTOLIC BLOOD PRESSURE: 80 MMHG

## 2019-11-13 DIAGNOSIS — Z00.00 HEALTHCARE MAINTENANCE: Primary | ICD-10-CM

## 2019-11-13 DIAGNOSIS — E66.01 MORBID OBESITY (HCC): ICD-10-CM

## 2019-11-13 DIAGNOSIS — H91.93 HEARING DIFFICULTY OF BOTH EARS: ICD-10-CM

## 2019-11-13 DIAGNOSIS — Z13.6 SCREENING FOR CARDIOVASCULAR, RESPIRATORY, AND GENITOURINARY DISEASES: ICD-10-CM

## 2019-11-13 DIAGNOSIS — Z13.1 SCREENING FOR DIABETES MELLITUS (DM): ICD-10-CM

## 2019-11-13 DIAGNOSIS — Z12.39 BREAST CANCER SCREENING: ICD-10-CM

## 2019-11-13 DIAGNOSIS — Z80.0 FAMILY HISTORY OF COLON CANCER: ICD-10-CM

## 2019-11-13 DIAGNOSIS — Z13.83 SCREENING FOR CARDIOVASCULAR, RESPIRATORY, AND GENITOURINARY DISEASES: ICD-10-CM

## 2019-11-13 DIAGNOSIS — Z13.89 SCREENING FOR CARDIOVASCULAR, RESPIRATORY, AND GENITOURINARY DISEASES: ICD-10-CM

## 2019-11-13 PROCEDURE — 99396 PREV VISIT EST AGE 40-64: CPT | Performed by: FAMILY MEDICINE

## 2019-11-13 NOTE — PROGRESS NOTES
Assessment/Plan:    No problem-specific Assessment & Plan notes found for this encounter  cpe done  bmi advised  Consider wt loss drugs adjunct in future     Diagnoses and all orders for this visit:    Healthcare maintenance  -     Ambulatory referral to Obstetrics / Gynecology; Future    BMI 40 0-44 9, adult (HCC)    Morbid obesity (HCC)  -     TSH, 3rd generation; Future    Screening for cardiovascular, respiratory, and genitourinary diseases  -     Lipid Panel with Direct LDL reflex; Future    Screening for diabetes mellitus (DM)  -     Comprehensive metabolic panel; Future    Breast cancer screening  -     Mammo screening bilateral w cad; Future    Hearing difficulty of both ears  -     Comprehensive hearing evaluation; Future    Family history of colon cancer  -     Ambulatory referral to Gastroenterology; Future              Return if symptoms worsen or fail to improve  Subjective:      Patient ID: Tucker Mitchell is a 49 y o  female  Chief Complaint   Patient presents with    Physical Exam     [facility]       HPI  bmi advised  Gaining wt and losing w/o much net loss  Not on zantac  No gerd sx per pt  Not counting calories or carbs  Exercise 0  Busy at work    The following portions of the patient's history were reviewed and updated as appropriate: allergies, current medications, past family history, past medical history, past social history, past surgical history and problem list     Review of Systems   Cardiovascular: Negative for chest pain and palpitations           Current Outpatient Medications   Medication Sig Dispense Refill    Multiple Vitamin (MULTIVITAMIN) capsule Take 1 capsule by mouth daily      Probiotic Product (PROBIOTIC-10 PO) Take by mouth as needed       acetaminophen (TYLENOL) 325 mg tablet Take 650 mg by mouth every 6 (six) hours as needed for mild pain      fluticasone (FLONASE) 50 mcg/act nasal spray 1 spray into each nostril as needed for rhinitis       No current facility-administered medications for this visit  Objective:    /80   Pulse 82   Temp (!) 96 7 °F (35 9 °C)   Resp 18   Ht 5' 1 5" (1 562 m)   Wt 103 kg (228 lb)   SpO2 97%   BMI 42 38 kg/m²        Physical Exam   Constitutional: She appears well-developed  No distress  HENT:   Head: Normocephalic  Mouth/Throat: No oropharyngeal exudate  Eyes: Conjunctivae are normal  No scleral icterus  Neck: Neck supple  Cardiovascular: Normal rate, regular rhythm and intact distal pulses  No murmur heard  Pulmonary/Chest: Effort normal and breath sounds normal  No respiratory distress  Abdominal: Soft  Bowel sounds are normal  She exhibits no distension  Musculoskeletal: She exhibits no edema or deformity  Neurological: She is alert  Skin: Skin is warm and dry  No pallor  Psychiatric: Her behavior is normal  Thought content normal    Nursing note and vitals reviewed               Tri Sharma DO

## 2020-01-14 LAB
ALBUMIN SERPL-MCNC: 4.2 G/DL (ref 3.5–5.5)
ALBUMIN/GLOB SERPL: 1.8 {RATIO} (ref 1.2–2.2)
ALP SERPL-CCNC: 84 IU/L (ref 39–117)
ALT SERPL-CCNC: 9 IU/L (ref 0–32)
AST SERPL-CCNC: 17 IU/L (ref 0–40)
BILIRUB SERPL-MCNC: 0.4 MG/DL (ref 0–1.2)
BUN SERPL-MCNC: 14 MG/DL (ref 6–24)
BUN/CREAT SERPL: 15 (ref 9–23)
CALCIUM SERPL-MCNC: 9.3 MG/DL (ref 8.7–10.2)
CHLORIDE SERPL-SCNC: 101 MMOL/L (ref 96–106)
CHOLEST SERPL-MCNC: 169 MG/DL (ref 100–199)
CO2 SERPL-SCNC: 24 MMOL/L (ref 20–29)
CREAT SERPL-MCNC: 0.95 MG/DL (ref 0.57–1)
GLOBULIN SER-MCNC: 2.4 G/DL (ref 1.5–4.5)
GLUCOSE SERPL-MCNC: 79 MG/DL (ref 65–99)
HDLC SERPL-MCNC: 51 MG/DL
LDLC SERPL CALC-MCNC: 104 MG/DL (ref 0–99)
MICRODELETION SYND BLD/T FISH: NORMAL
POTASSIUM SERPL-SCNC: 4.3 MMOL/L (ref 3.5–5.2)
PROT SERPL-MCNC: 6.6 G/DL (ref 6–8.5)
SL AMB EGFR AFRICAN AMERICAN: 79 ML/MIN/1.73
SL AMB EGFR NON AFRICAN AMERICAN: 69 ML/MIN/1.73
SODIUM SERPL-SCNC: 139 MMOL/L (ref 134–144)
TRIGL SERPL-MCNC: 72 MG/DL (ref 0–149)
TSH SERPL DL<=0.005 MIU/L-ACNC: 1.16 UIU/ML (ref 0.45–4.5)

## 2020-01-16 ENCOUNTER — TRANSCRIBE ORDERS (OUTPATIENT)
Dept: ADMINISTRATIVE | Facility: HOSPITAL | Age: 55
End: 2020-01-16

## 2020-01-16 ENCOUNTER — HOSPITAL ENCOUNTER (OUTPATIENT)
Dept: RADIOLOGY | Facility: HOSPITAL | Age: 55
Discharge: HOME | End: 2020-01-16
Attending: FAMILY MEDICINE
Payer: COMMERCIAL

## 2020-01-16 VITALS — HEIGHT: 61 IN | BODY MASS INDEX: 43.05 KG/M2 | WEIGHT: 228 LBS

## 2020-01-16 DIAGNOSIS — Z12.39 BREAST CANCER SCREENING: ICD-10-CM

## 2020-01-16 PROCEDURE — 77063 BREAST TOMOSYNTHESIS BI: CPT

## 2020-01-16 PROCEDURE — 76642 ULTRASOUND BREAST LIMITED: CPT

## 2020-01-16 PROCEDURE — 77067 SCR MAMMO BI INCL CAD: CPT

## 2020-01-27 ENCOUNTER — CONSULT (OUTPATIENT)
Dept: GASTROENTEROLOGY | Facility: CLINIC | Age: 55
End: 2020-01-27
Payer: COMMERCIAL

## 2020-01-27 VITALS
WEIGHT: 237.6 LBS | RESPIRATION RATE: 18 BRPM | HEART RATE: 76 BPM | BODY MASS INDEX: 46.65 KG/M2 | DIASTOLIC BLOOD PRESSURE: 70 MMHG | TEMPERATURE: 96.8 F | HEIGHT: 60 IN | SYSTOLIC BLOOD PRESSURE: 124 MMHG

## 2020-01-27 DIAGNOSIS — Z80.0 FAMILY HISTORY OF COLON CANCER: Primary | ICD-10-CM

## 2020-01-27 PROBLEM — K62.5 BRIGHT RED RECTAL BLEEDING: Status: RESOLVED | Noted: 2020-01-27 | Resolved: 2020-01-27

## 2020-01-27 PROCEDURE — 99243 OFF/OP CNSLTJ NEW/EST LOW 30: CPT | Performed by: INTERNAL MEDICINE

## 2020-01-27 NOTE — LETTER
January 27, 2020     Bonnie Olivarez, DO  [facility] [place] [address]    Patient: Dez Wood   YOB: 1965   Date of Visit: 1/27/2020       Dear Dr Humera Lozano: Thank you for referring Dez Wood to me for evaluation  Below are my notes for this consultation  If you have questions, please do not hesitate to call me  I look forward to following your patient along with you  Sincerely,        Belidna Mcfarland MD        CC: No Recipients  Belinda Mcfarland MD  1/27/2020  3:27 PM  Sign at close encounter  Consultation - [facility]  Dez Wood 49 y o  female MRN: [id number]          Assessment & Plan:    Very pleasant 78-year-old female, recently her stepdaughter passed away  Patient is due for a screening colonoscopy last examination was 5 years ago was normal at that time, patient has a family history of colon cancer in her mother  1  Colon cancer screening:  High risk due to family history  -we will schedule patient's colonoscopy  -discussed with her the risks of the procedure including bleeding, surgery, perforation, missed polyp detection rate            _____________________________________________________________        CC:  Evaluation for colonoscopy    HPI:  Dez Wood is a 49 y  o female who was referred for evaluation of colonoscopy  As you know this is a very pleasant and healthy 78-year-old female, last colonoscopy was 5 years ago was normal at that time, she has a family history of colon cancer in her mother who was 58 with the time of diagnosis, she also has a maternal aunt with breast cancer  Patient denies any significant GI complaints, denies any nausea, vomiting, heartburn, dysphagia  Reports having fairly regular bowel movements, tends towards constipation especially if she does not consume enough fluids  Does better if she eats more healthy  Denies any melena or rectal bleeding    Last colonoscopy was normal     Past medical history is relatively unremarkable  Surgical history is notable for partial hysterectomy and   Denies any tobacco, denies alcohol  She works at a group home  Family history as noted above mother with colon cancer in her early 62s  ROS:  The remainder of the ROS was negative except for the pertinent positives mentioned in HPI  Allergies: Amoxicillin    Medications:   Current Outpatient Medications:     acetaminophen (TYLENOL) 325 mg tablet, Take 650 mg by mouth every 6 (six) hours as needed for mild pain, Disp: , Rfl:     fluticasone (FLONASE) 50 mcg/act nasal spray, 1 spray into each nostril as needed for rhinitis, Disp: , Rfl:     Multiple Vitamin (MULTIVITAMIN) capsule, Take 1 capsule by mouth daily, Disp: , Rfl:     Probiotic Product (PROBIOTIC-10 PO), Take by mouth as needed , Disp: , Rfl:     Na Sulfate-K Sulfate-Mg Sulf (SUPREP BOWEL PREP KIT) 17 5-3 13-1 6 GM/177ML SOLN, Take 1 Bottle by mouth once for 1 dose, Disp: 1 Bottle, Rfl: 0'    Past Medical History:   Diagnosis Date    Blurred vision     Bright red rectal bleeding     last assessed 4/15/15    Chronic constipation     last assessed 4/15/15     Dizziness     Headache     Hematuria     last assessed 4/6/15     Iron deficiency anemia     resolved 16     Palpitations        Past Surgical History:   Procedure Laterality Date     SECTION      CYSTOSCOPY  2015    diagnostic / Managed by Kate Ni / resolved 7/29/15     HYSTERECTOMY         Family History   Problem Relation Age of Onset    Colon cancer Mother     Hypertension Mother     Hypothyroidism Mother     Prostate cancer Father     Diabetes Sister         3 sisters    No Known Problems Brother         6 brothers    Breast cancer additional onset Cousin     Heart disease Neg Hx     Stroke Neg Hx         reports that she has quit smoking   She has never used smokeless tobacco  She reports that she does not drink alcohol or use drugs            Physical Exam:     /70 (BP Location: Left arm, Patient Position: Sitting, Cuff Size: Standard)   Pulse 76   Temp (!) 96 8 °F (36 °C) (Tympanic)   Resp 18   Ht 5' (1 524 m)   Wt 108 kg (237 lb 9 6 oz)   BMI 46 40 kg/m²      Gen: wn/wd, NAD, morbidly obese  HEENT: anicteric, MMM, no cervical LAD  CVS: RRR, no m/r/g  CHEST: CTA b/l  ABD: +BS, soft, NT,ND, no hepatosplenomegaly  EXT: no c/c/e  NEURO: aaox3  SKIN: NO rashes

## 2020-01-27 NOTE — PROGRESS NOTES
Consultation - [facility]  Jamee Johansen 49 y o  female MRN: [id number]          Assessment & Plan:    Very pleasant 72-year-old female, recently her stepdaughter passed away  Patient is due for a screening colonoscopy last examination was 5 years ago was normal at that time, patient has a family history of colon cancer in her mother  1  Colon cancer screening:  High risk due to family history  -we will schedule patient's colonoscopy  -discussed with her the risks of the procedure including bleeding, surgery, perforation, missed polyp detection rate            _____________________________________________________________        CC:  Evaluation for colonoscopy    HPI:  Jamee Johansen is a 49 y  o female who was referred for evaluation of colonoscopy  As you know this is a very pleasant and healthy 72-year-old female, last colonoscopy was 5 years ago was normal at that time, she has a family history of colon cancer in her mother who was 58 with the time of diagnosis, she also has a maternal aunt with breast cancer  Patient denies any significant GI complaints, denies any nausea, vomiting, heartburn, dysphagia  Reports having fairly regular bowel movements, tends towards constipation especially if she does not consume enough fluids  Does better if she eats more healthy  Denies any melena or rectal bleeding  Last colonoscopy was normal     Past medical history is relatively unremarkable  Surgical history is notable for partial hysterectomy and   Denies any tobacco, denies alcohol  She works at a group home  Family history as noted above mother with colon cancer in her early 62s  ROS:  The remainder of the ROS was negative except for the pertinent positives mentioned in HPI           Allergies: Amoxicillin    Medications:   Current Outpatient Medications:     acetaminophen (TYLENOL) 325 mg tablet, Take 650 mg by mouth every 6 (six) hours as needed for mild pain, Disp: , Rfl:     fluticasone (FLONASE) 50 mcg/act nasal spray, 1 spray into each nostril as needed for rhinitis, Disp: , Rfl:     Multiple Vitamin (MULTIVITAMIN) capsule, Take 1 capsule by mouth daily, Disp: , Rfl:     Probiotic Product (PROBIOTIC-10 PO), Take by mouth as needed , Disp: , Rfl:     Na Sulfate-K Sulfate-Mg Sulf (SUPREP BOWEL PREP KIT) 17 5-3 13-1 6 GM/177ML SOLN, Take 1 Bottle by mouth once for 1 dose, Disp: 1 Bottle, Rfl: 0'    Past Medical History:   Diagnosis Date    Blurred vision     Bright red rectal bleeding     last assessed 4/15/15    Chronic constipation     last assessed 4/15/15     Dizziness     Headache     Hematuria     last assessed 4/6/15     Iron deficiency anemia     resolved 16     Palpitations        Past Surgical History:   Procedure Laterality Date     SECTION      CYSTOSCOPY  2015    diagnostic / Managed by Rihc Duff / resolved 7/29/15     HYSTERECTOMY         Family History   Problem Relation Age of Onset    Colon cancer Mother     Hypertension Mother     Hypothyroidism Mother     Prostate cancer Father     Diabetes Sister         3 sisters    No Known Problems Brother         6 brothers    Breast cancer additional onset Cousin     Heart disease Neg Hx     Stroke Neg Hx         reports that she has quit smoking  She has never used smokeless tobacco  She reports that she does not drink alcohol or use drugs            Physical Exam:     /70 (BP Location: Left arm, Patient Position: Sitting, Cuff Size: Standard)   Pulse 76   Temp (!) 96 8 °F (36 °C) (Tympanic)   Resp 18   Ht 5' (1 524 m)   Wt 108 kg (237 lb 9 6 oz)   BMI 46 40 kg/m²     Gen: wn/wd, NAD, morbidly obese  HEENT: anicteric, MMM, no cervical LAD  CVS: RRR, no m/r/g  CHEST: CTA b/l  ABD: +BS, soft, NT,ND, no hepatosplenomegaly  EXT: no c/c/e  NEURO: aaox3  SKIN: NO rashes

## 2020-02-05 NOTE — PRE-PROCEDURE INSTRUCTIONS
Pre-Surgery Instructions:   Medication Instructions    acetaminophen (TYLENOL) 325 mg tablet Patient was instructed by Physician and understands   fluticasone (FLONASE) 50 mcg/act nasal spray Patient was instructed by Physician and understands   Multiple Vitamin (MULTIVITAMIN) capsule Patient was instructed by Physician and understands   Na Sulfate-K Sulfate-Mg Sulf (SUPREP BOWEL PREP KIT) 17 5-3 13-1 6 GM/177ML SOLN Patient was instructed by Physician and understands   Probiotic Product (PROBIOTIC-10 PO) Patient was instructed by Physician and understands  Pt to follow Dr Sara Juarez instructions    Torsten Alvarado

## 2020-02-06 ENCOUNTER — ANESTHESIA EVENT (OUTPATIENT)
Dept: GASTROENTEROLOGY | Facility: AMBULARY SURGERY CENTER | Age: 55
End: 2020-02-06

## 2020-02-07 ENCOUNTER — ANESTHESIA (OUTPATIENT)
Dept: GASTROENTEROLOGY | Facility: AMBULARY SURGERY CENTER | Age: 55
End: 2020-02-07

## 2020-02-07 ENCOUNTER — HOSPITAL ENCOUNTER (OUTPATIENT)
Dept: GASTROENTEROLOGY | Facility: AMBULARY SURGERY CENTER | Age: 55
Setting detail: OUTPATIENT SURGERY
Discharge: HOME | End: 2020-02-07
Attending: INTERNAL MEDICINE | Admitting: INTERNAL MEDICINE
Payer: COMMERCIAL

## 2020-02-07 VITALS
OXYGEN SATURATION: 97 % | HEIGHT: 62 IN | RESPIRATION RATE: 16 BRPM | DIASTOLIC BLOOD PRESSURE: 92 MMHG | BODY MASS INDEX: 43.61 KG/M2 | SYSTOLIC BLOOD PRESSURE: 162 MMHG | TEMPERATURE: 97.5 F | WEIGHT: 237 LBS | HEART RATE: 76 BPM

## 2020-02-07 DIAGNOSIS — Z80.0 FAMILY HISTORY OF COLON CANCER: ICD-10-CM

## 2020-02-07 PROCEDURE — G0105 COLORECTAL SCRN; HI RISK IND: HCPCS | Performed by: INTERNAL MEDICINE

## 2020-02-07 RX ORDER — LIDOCAINE HYDROCHLORIDE 10 MG/ML
INJECTION, SOLUTION EPIDURAL; INFILTRATION; INTRACAUDAL; PERINEURAL AS NEEDED
Status: DISCONTINUED | OUTPATIENT
Start: 2020-02-07 | End: 2020-02-07 | Stop reason: SURG

## 2020-02-07 RX ORDER — SODIUM CHLORIDE, SODIUM LACTATE, POTASSIUM CHLORIDE, CALCIUM CHLORIDE 600; 310; 30; 20 MG/100ML; MG/100ML; MG/100ML; MG/100ML
75 INJECTION, SOLUTION INTRAVENOUS CONTINUOUS
Status: DISCONTINUED | OUTPATIENT
Start: 2020-02-07 | End: 2020-02-11 | Stop reason: HOSPADM

## 2020-02-07 RX ORDER — PROPOFOL 10 MG/ML
INJECTION, EMULSION INTRAVENOUS AS NEEDED
Status: DISCONTINUED | OUTPATIENT
Start: 2020-02-07 | End: 2020-02-07 | Stop reason: SURG

## 2020-02-07 RX ADMIN — LIDOCAINE HYDROCHLORIDE 40 MG: 10 INJECTION, SOLUTION EPIDURAL; INFILTRATION; INTRACAUDAL; PERINEURAL at 10:02

## 2020-02-07 RX ADMIN — PROPOFOL 40 MG: 10 INJECTION, EMULSION INTRAVENOUS at 10:14

## 2020-02-07 RX ADMIN — LIDOCAINE HYDROCHLORIDE 40 MG: 10 INJECTION, SOLUTION EPIDURAL; INFILTRATION; INTRACAUDAL; PERINEURAL at 10:08

## 2020-02-07 RX ADMIN — LIDOCAINE HYDROCHLORIDE 50 MG: 10 INJECTION, SOLUTION EPIDURAL; INFILTRATION; INTRACAUDAL; PERINEURAL at 10:05

## 2020-02-07 RX ADMIN — LIDOCAINE HYDROCHLORIDE 30 MG: 10 INJECTION, SOLUTION EPIDURAL; INFILTRATION; INTRACAUDAL; PERINEURAL at 10:10

## 2020-02-07 RX ADMIN — LIDOCAINE HYDROCHLORIDE 30 MG: 10 INJECTION, SOLUTION EPIDURAL; INFILTRATION; INTRACAUDAL; PERINEURAL at 10:18

## 2020-02-07 RX ADMIN — PROPOFOL 50 MG: 10 INJECTION, EMULSION INTRAVENOUS at 10:08

## 2020-02-07 RX ADMIN — LIDOCAINE HYDROCHLORIDE 30 MG: 10 INJECTION, SOLUTION EPIDURAL; INFILTRATION; INTRACAUDAL; PERINEURAL at 10:14

## 2020-02-07 RX ADMIN — PROPOFOL 70 MG: 10 INJECTION, EMULSION INTRAVENOUS at 10:02

## 2020-02-07 RX ADMIN — SODIUM CHLORIDE, SODIUM LACTATE, POTASSIUM CHLORIDE, AND CALCIUM CHLORIDE 75 ML/HR: .6; .31; .03; .02 INJECTION, SOLUTION INTRAVENOUS at 08:12

## 2020-02-07 NOTE — ANESTHESIA PREPROCEDURE EVALUATION
Review of Systems/Medical History  Patient summary reviewed  Chart reviewed  No history of anesthetic complications     Cardiovascular  Dysrhythmias (palpitations) ,    Pulmonary  Smoker ex-smoker  ,        GI/Hepatic    No PUD (h/o duodenal ulcer),             Endo/Other    Obesity    GYN    Hysterectomy,        Hematology  No anemia ,    Comment: Religion Musculoskeletal  Back pain , cervical pain,        Neurology    Headaches,    Psychology           Physical Exam    Airway    Mallampati score: III  TM Distance: >3 FB  Neck ROM: full     Dental       Cardiovascular  Rhythm: regular, Rate: normal,     Pulmonary  Breath sounds clear to auscultation,     Other Findings        Anesthesia Plan  ASA Score- 2     Anesthesia Type- IV sedation with anesthesia with ASA Monitors  Additional Monitors:   Airway Plan:         Plan Factors-    Induction- intravenous  Postoperative Plan-     Informed Consent- Anesthetic plan and risks discussed with patient  I personally reviewed this patient with the CRNA  Discussed and agreed on the Anesthesia Plan with the CRNA  Caitlin Herrera

## 2020-02-07 NOTE — H&P
History and Physical - [facility]  Tyesha Peraza 49 y o  female MRN: [id number]    HPI: Tyesha Peraza is a 49y o  year old female who presents with screening colonoscopy, family hx of colon cancer         Review of Systems    Historical Information   Past Medical History:   Diagnosis Date    Constipation     on occ    Dizziness     inner ear    Headache     History of duodenal ulcer     History of iron deficiency anemia     History of palpitations     History of UTI     with hematuria    Neck pain     Obesity     Refusal of blood transfusions as patient is [employer]     Wears glasses     will wear occ for reading     Past Surgical History:   Procedure Laterality Date    BARTHOLIN GLAND CYST EXCISION       SECTION      5702,0956    COLONOSCOPY      - "no polpyps"    CYSTOSCOPY  2015    diagnostic / Managed by Alberto Bello / resolved 7/29/15     DILATION AND CURETTAGE OF UTERUS      HYSTERECTOMY      partial-ovaries remain     Social History   Social History     Substance and Sexual Activity   Alcohol Use No    Comment: non drinker/ no alcohol use      Social History     Substance and Sexual Activity   Drug Use No     Social History     Tobacco Use   Smoking Status Former Smoker    Last attempt to quit: Gabrielle Rosario Years since quittin 1   Smokeless Tobacco Never Used     Family History   Problem Relation Age of Onset    Colon cancer Mother     Hypertension Mother     Hypothyroidism Mother     Cancer Mother         colon w/mets    Prostate cancer Father     Cancer Father         prostate    Diabetes Sister         3 sisters    Breast cancer additional onset Cousin     Heart disease Neg Hx     Stroke Neg Hx        Meds/Allergies       (Not in a hospital admission)    Allergies   Allergen Reactions    Amoxicillin Anaphylaxis     Throat closes and itching    Other Hives     Eggplant       Objective     /80   Pulse 81   Temp 97 5 °F (36 4 °C) (Tympanic)   Resp 18   Ht 5' 2" (1 575 m)   Wt 108 kg (237 lb)   SpO2 97%   BMI 43 35 kg/m²       PHYSICAL EXAM    Gen: NAD  CV: RRR  CHEST: Clear  ABD: soft, NT/ND  EXT: no edema  Neuro: AAO      ASSESSMENT/PLAN:  This is a 49y o  year old female here for screening colonoscopy, family hx of colon cancer       PLAN:   Procedure: colonoscopy

## 2020-02-07 NOTE — ANESTHESIA POSTPROCEDURE EVALUATION
Post-Op Assessment Note    CV Status:  Stable  Pain Score: 0    Pain management: adequate     Mental Status:  Awake   Hydration Status:  Stable   PONV Controlled:  None   Airway Patency:  Patent   Post Op Vitals Reviewed: Yes      Staff: CRNA   Comments: spontaneously breathing, HOB @ ~20-30 degrees, vss, fully endorsed to recovery w/o AC          BP   143/64   Temp      Pulse  72   Resp   12   SpO2   100

## 2020-02-08 PROBLEM — K57.30 SIGMOID DIVERTICULOSIS: Status: ACTIVE | Noted: 2020-02-08

## 2020-02-11 ENCOUNTER — OFFICE VISIT (OUTPATIENT)
Dept: OBGYN CLINIC | Facility: CLINIC | Age: 55
End: 2020-02-11
Payer: COMMERCIAL

## 2020-02-11 VITALS
SYSTOLIC BLOOD PRESSURE: 140 MMHG | BODY MASS INDEX: 43.43 KG/M2 | HEIGHT: 62 IN | WEIGHT: 236 LBS | DIASTOLIC BLOOD PRESSURE: 90 MMHG

## 2020-02-11 DIAGNOSIS — Z01.419 ENCNTR FOR GYN EXAM (GENERAL) (ROUTINE) W/O ABN FINDINGS: Primary | ICD-10-CM

## 2020-02-11 DIAGNOSIS — Z00.00 HEALTHCARE MAINTENANCE: ICD-10-CM

## 2020-02-11 DIAGNOSIS — N60.02 CYST OF LEFT BREAST: ICD-10-CM

## 2020-02-11 PROCEDURE — 3008F BODY MASS INDEX DOCD: CPT | Performed by: NURSE PRACTITIONER

## 2020-02-11 PROCEDURE — S0610 ANNUAL GYNECOLOGICAL EXAMINA: HCPCS | Performed by: NURSE PRACTITIONER

## 2020-02-11 NOTE — PROGRESS NOTES
Assessment/Plan   Diagnoses and all orders for this visit:    Cyst of left breast  -     US breast left limited (diagnostic); Future    Healthcare maintenance  -     Ambulatory referral to Obstetrics / Gynecology    Encntr for gyn exam (general) (routine) w/o abn findings  -     PapIG, HPV, rfx 16/18        Discussion    Reviewed normal exam today  Pap with HPV done today  Normal breast exam today  Monthly SBEs advised  Mammograms yearly  Has rx for follow up ultrasound due in June  Encourage at least 1200 mg calcium citrate + 2000 IUs vitamin D3 divided through diet and supplement throughout the day  Encourage 30-40 min weight bearing exercise most days of week  Colon cancer screening with a colonoscopy is up to date  All questions have been answered to her satisfaction  RTO for annual or sooner if needed    Subjective     Ahsan Found is a 49 y o  female who presents for annual well woman exam    Last exam 2017 Pap normal per patient, denies any hx of abnormal pap smear   Pap guidelines reviewed with patient  Pt would like Pap today  Pt denies any abnormal vaginal discharge, itching, or odor  Pt in a mutually exclusive relationship () with a male partner and denies the need for STD testing today  Menstrual Cycle:  LMP: 2017  Denies any menopausal complaints  Denies any hx   OB History     G 2 P 2   Contraception: Post-menopausal, partial hysterectomy 2017 due to enlarged fibroid uterus  Pt unsure if she has cervix of not  Does know she has one ovary  Practices monthly SBEs, no breast complaints today  Last Mammogram 1/16/2020 right BiRad II left breast required ultrasound, recommend follow up ultrasound in 6 months  Colonoscopy 2/7/2020 Normal per patient, due back in 5 years  Denies any bowel or bladder issues  Pt follows with PCP for regular check-ups and blood work  Review of Systems   All other systems reviewed and are negative        The following portions of the patient's history were reviewed and updated as appropriate: allergies, current medications, past family history, past medical history, past social history, past surgical history and problem list     Past Medical History:   Diagnosis Date    Constipation     on occ    Dizziness     inner ear    Headache     History of duodenal ulcer     History of iron deficiency anemia     History of palpitations     History of UTI     with hematuria    Neck pain     Obesity     Refusal of blood transfusions as patient is [place]     Wears glasses     will wear occ for reading       Past Surgical History:   Procedure Laterality Date    BARTHOLIN GLAND CYST EXCISION       SECTION      8157,3568    COLONOSCOPY      - "no polpyps"    CYSTOSCOPY  2015    diagnostic / Managed by Chris Ortiz / dawson 7/29/15     DILATION AND CURETTAGE OF UTERUS      HYSTERECTOMY      partial-ovaries remain       Family History   Problem Relation Age of Onset    Colon cancer Mother     Hypertension Mother     Hypothyroidism Mother     Cancer Mother         colon w/mets    Prostate cancer Father     Cancer Father         prostate    Diabetes Sister         3 sisters    Breast cancer additional onset Cousin     Heart disease Neg Hx     Stroke Neg Hx        Social History     Socioeconomic History    Marital status: /Civil Union     Spouse name: Not on file    Number of children: Not on file    Years of education: Not on file    Highest education level: Not on file   Occupational History    Not on file   Social Needs    Financial resource strain: Not on file    Food insecurity:     Worry: Not on file     Inability: Not on file    Transportation needs:     Medical: Not on file     Non-medical: Not on file   Tobacco Use    Smoking status: Former Smoker     Last attempt to quit:      Years since quittin     Smokeless tobacco: Never Used   Substance and Sexual Activity    Alcohol use: No     Comment: non drinker/ no alcohol use     Drug use: No    Sexual activity: Yes     Partners: Male     Birth control/protection: Surgical   Lifestyle    Physical activity:     Days per week: Not on file     Minutes per session: Not on file    Stress: Not on file   Relationships    Social connections:     Talks on phone: Not on file     Gets together: Not on file     Attends Mosque service: Not on file     Active member of club or organization: Not on file     Attends meetings of clubs or organizations: Not on file     Relationship status: Not on file    Intimate partner violence:     Fear of current or ex partner: Not on file     Emotionally abused: Not on file     Physically abused: Not on file     Forced sexual activity: Not on file   Other Topics Concern    Not on file   Social History Narrative    Not on file         Current Outpatient Medications:     acetaminophen (TYLENOL) 325 mg tablet, Take 650 mg by mouth every 6 (six) hours as needed for mild pain, Disp: , Rfl:     fluticasone (FLONASE) 50 mcg/act nasal spray, 1 spray into each nostril as needed for rhinitis, Disp: , Rfl:     Multiple Vitamin (MULTIVITAMIN) capsule, Take 1 capsule by mouth daily Last dose 2/1/20, Disp: , Rfl:     Probiotic Product (PROBIOTIC-10 PO), Take by mouth every morning , Disp: , Rfl:   No current facility-administered medications for this visit  Allergies   Allergen Reactions    Amoxicillin Anaphylaxis     Throat closes and itching    Other Hives     Eggplant       Objective   Vitals:    02/11/20 1013   BP: 140/90   Weight: 107 kg (236 lb)   Height: 5' 2" (1 575 m)     Physical Exam   Constitutional: She is oriented to person, place, and time  She appears well-developed and well-nourished  HENT:   Head: Normocephalic  Neck: Normal range of motion  Neck supple  No tracheal deviation present  No thyromegaly present  Cardiovascular: Normal rate, regular rhythm and normal heart sounds  Pulmonary/Chest: Effort normal and breath sounds normal  Right breast exhibits no inverted nipple, no mass, no nipple discharge, no skin change and no tenderness  Left breast exhibits no inverted nipple, no mass, no nipple discharge, no skin change and no tenderness  No breast tenderness, discharge or bleeding  Breasts are symmetrical    Abdominal: Soft  Bowel sounds are normal  She exhibits no distension and no mass  There is no tenderness  There is no rebound and no guarding  Genitourinary: Vagina normal  Guaiac stool: deferred due to recent colonoscopy  No breast tenderness, discharge or bleeding  No labial fusion  There is no rash, tenderness, lesion or injury on the right labia  There is no rash, tenderness, lesion or injury on the left labia  Cervix exhibits no motion tenderness, no discharge and no friability  Genitourinary Comments: Uterus surgically absent  Adnexa difficult to assess due to body habitus   Musculoskeletal: Normal range of motion  Neurological: She is alert and oriented to person, place, and time  Skin: Skin is warm and dry  Psychiatric: She has a normal mood and affect   Her behavior is normal  Judgment and thought content normal

## 2020-02-12 LAB
CYTOLOGIST CVX/VAG CYTO: NORMAL
DX ICD CODE: NORMAL
HPV I/H RISK 1 DNA CVX QL PROBE+SIG AMP: NEGATIVE
OTHER STN SPEC: NORMAL
PATH REPORT.FINAL DX SPEC: NORMAL
SL AMB NOTE:: NORMAL
SL AMB SPECIMEN ADEQUACY: NORMAL
SL AMB TEST METHODOLOGY: NORMAL

## 2020-06-12 ENCOUNTER — TELEPHONE (OUTPATIENT)
Dept: FAMILY MEDICINE CLINIC | Facility: CLINIC | Age: 55
End: 2020-06-12

## 2020-06-12 ENCOUNTER — OFFICE VISIT (OUTPATIENT)
Dept: FAMILY MEDICINE CLINIC | Facility: CLINIC | Age: 55
End: 2020-06-12
Payer: COMMERCIAL

## 2020-06-12 VITALS
RESPIRATION RATE: 16 BRPM | DIASTOLIC BLOOD PRESSURE: 78 MMHG | OXYGEN SATURATION: 97 % | HEART RATE: 86 BPM | TEMPERATURE: 98.1 F | BODY MASS INDEX: 43.43 KG/M2 | HEIGHT: 62 IN | WEIGHT: 236 LBS | SYSTOLIC BLOOD PRESSURE: 120 MMHG

## 2020-06-12 DIAGNOSIS — I83.91 VARICOSE VEINS OF RIGHT LOWER EXTREMITY, UNSPECIFIED WHETHER COMPLICATED: ICD-10-CM

## 2020-06-12 DIAGNOSIS — G44.229 CHRONIC TENSION-TYPE HEADACHE, NOT INTRACTABLE: Primary | ICD-10-CM

## 2020-06-12 DIAGNOSIS — E66.01 MORBID OBESITY (HCC): ICD-10-CM

## 2020-06-12 DIAGNOSIS — J30.9 ALLERGIC RHINITIS, UNSPECIFIED SEASONALITY, UNSPECIFIED TRIGGER: ICD-10-CM

## 2020-06-12 PROCEDURE — 1036F TOBACCO NON-USER: CPT | Performed by: FAMILY MEDICINE

## 2020-06-12 PROCEDURE — 99214 OFFICE O/P EST MOD 30 MIN: CPT | Performed by: FAMILY MEDICINE

## 2020-06-12 PROCEDURE — 3008F BODY MASS INDEX DOCD: CPT | Performed by: FAMILY MEDICINE

## 2020-06-12 RX ORDER — FLUTICASONE PROPIONATE 50 MCG
2 SPRAY, SUSPENSION (ML) NASAL AS NEEDED
Qty: 3 BOTTLE | Refills: 3 | Status: SHIPPED | OUTPATIENT
Start: 2020-06-12 | End: 2021-01-07

## 2020-06-12 RX ORDER — CYCLOBENZAPRINE HCL 10 MG
10 TABLET ORAL
Qty: 30 TABLET | Refills: 1 | Status: ON HOLD | OUTPATIENT
Start: 2020-06-12 | End: 2020-12-23

## 2020-11-01 ENCOUNTER — APPOINTMENT (EMERGENCY)
Dept: RADIOLOGY | Facility: HOSPITAL | Age: 55
End: 2020-11-01
Payer: COMMERCIAL

## 2020-11-01 ENCOUNTER — HOSPITAL ENCOUNTER (EMERGENCY)
Facility: HOSPITAL | Age: 55
Discharge: HOME | End: 2020-11-01
Attending: EMERGENCY MEDICINE | Admitting: EMERGENCY MEDICINE
Payer: COMMERCIAL

## 2020-11-01 VITALS
RESPIRATION RATE: 18 BRPM | OXYGEN SATURATION: 98 % | HEART RATE: 67 BPM | SYSTOLIC BLOOD PRESSURE: 139 MMHG | WEIGHT: 236 LBS | TEMPERATURE: 97.6 F | DIASTOLIC BLOOD PRESSURE: 75 MMHG | BODY MASS INDEX: 43.16 KG/M2

## 2020-11-01 DIAGNOSIS — J32.9 SINUSITIS: ICD-10-CM

## 2020-11-01 DIAGNOSIS — G43.909 MIGRAINE: Primary | ICD-10-CM

## 2020-11-01 LAB
ALBUMIN SERPL BCP-MCNC: 3.6 G/DL (ref 3.5–5)
ALP SERPL-CCNC: 111 U/L (ref 46–116)
ALT SERPL W P-5'-P-CCNC: 23 U/L (ref 12–78)
ANION GAP SERPL CALCULATED.3IONS-SCNC: 4 MMOL/L (ref 4–13)
AST SERPL W P-5'-P-CCNC: 21 U/L (ref 5–45)
BASOPHILS # BLD AUTO: 0.04 THOUSANDS/ΜL (ref 0–0.1)
BASOPHILS NFR BLD AUTO: 1 % (ref 0–1)
BILIRUB SERPL-MCNC: 0.4 MG/DL (ref 0.2–1)
BUN SERPL-MCNC: 11 MG/DL (ref 5–25)
CALCIUM SERPL-MCNC: 9.1 MG/DL (ref 8.3–10.1)
CHLORIDE SERPL-SCNC: 103 MMOL/L (ref 100–108)
CO2 SERPL-SCNC: 32 MMOL/L (ref 21–32)
CREAT SERPL-MCNC: 0.86 MG/DL (ref 0.6–1.3)
EOSINOPHIL # BLD AUTO: 0.11 THOUSAND/ΜL (ref 0–0.61)
EOSINOPHIL NFR BLD AUTO: 1 % (ref 0–6)
ERYTHROCYTE [DISTWIDTH] IN BLOOD BY AUTOMATED COUNT: 12.8 % (ref 11.6–15.1)
GFR SERPL CREATININE-BSD FRML MDRD: 89 ML/MIN/1.73SQ M
GLUCOSE SERPL-MCNC: 106 MG/DL (ref 65–140)
HCT VFR BLD AUTO: 45.3 % (ref 34.8–46.1)
HGB BLD-MCNC: 14.2 G/DL (ref 11.5–15.4)
IMM GRANULOCYTES # BLD AUTO: 0.04 THOUSAND/UL (ref 0–0.2)
IMM GRANULOCYTES NFR BLD AUTO: 1 % (ref 0–2)
LYMPHOCYTES # BLD AUTO: 2.98 THOUSANDS/ΜL (ref 0.6–4.47)
LYMPHOCYTES NFR BLD AUTO: 35 % (ref 14–44)
MCH RBC QN AUTO: 28.7 PG (ref 26.8–34.3)
MCHC RBC AUTO-ENTMCNC: 31.3 G/DL (ref 31.4–37.4)
MCV RBC AUTO: 92 FL (ref 82–98)
MONOCYTES # BLD AUTO: 0.4 THOUSAND/ΜL (ref 0.17–1.22)
MONOCYTES NFR BLD AUTO: 5 % (ref 4–12)
NEUTROPHILS # BLD AUTO: 5 THOUSANDS/ΜL (ref 1.85–7.62)
NEUTS SEG NFR BLD AUTO: 57 % (ref 43–75)
NRBC BLD AUTO-RTO: 0 /100 WBCS
PLATELET # BLD AUTO: 260 THOUSANDS/UL (ref 149–390)
PMV BLD AUTO: 10.3 FL (ref 8.9–12.7)
POTASSIUM SERPL-SCNC: 4.7 MMOL/L (ref 3.5–5.3)
PROT SERPL-MCNC: 7.6 G/DL (ref 6.4–8.2)
RBC # BLD AUTO: 4.94 MILLION/UL (ref 3.81–5.12)
SODIUM SERPL-SCNC: 139 MMOL/L (ref 136–145)
WBC # BLD AUTO: 8.57 THOUSAND/UL (ref 4.31–10.16)

## 2020-11-01 PROCEDURE — 96361 HYDRATE IV INFUSION ADD-ON: CPT

## 2020-11-01 PROCEDURE — 99284 EMERGENCY DEPT VISIT MOD MDM: CPT | Performed by: EMERGENCY MEDICINE

## 2020-11-01 PROCEDURE — 96374 THER/PROPH/DIAG INJ IV PUSH: CPT

## 2020-11-01 PROCEDURE — 99284 EMERGENCY DEPT VISIT MOD MDM: CPT

## 2020-11-01 PROCEDURE — 85025 COMPLETE CBC W/AUTO DIFF WBC: CPT | Performed by: EMERGENCY MEDICINE

## 2020-11-01 PROCEDURE — 80053 COMPREHEN METABOLIC PANEL: CPT | Performed by: EMERGENCY MEDICINE

## 2020-11-01 PROCEDURE — 70450 CT HEAD/BRAIN W/O DYE: CPT

## 2020-11-01 PROCEDURE — 36415 COLL VENOUS BLD VENIPUNCTURE: CPT | Performed by: EMERGENCY MEDICINE

## 2020-11-01 PROCEDURE — 96375 TX/PRO/DX INJ NEW DRUG ADDON: CPT

## 2020-11-01 PROCEDURE — G1004 CDSM NDSC: HCPCS

## 2020-11-01 RX ORDER — BUTALBITAL, ACETAMINOPHEN AND CAFFEINE 50; 325; 40 MG/1; MG/1; MG/1
1 TABLET ORAL EVERY 6 HOURS PRN
Qty: 15 TABLET | Refills: 0 | Status: ON HOLD | OUTPATIENT
Start: 2020-11-01 | End: 2020-12-23

## 2020-11-01 RX ORDER — DOXYCYCLINE HYCLATE 100 MG/1
100 CAPSULE ORAL 2 TIMES DAILY
Qty: 14 CAPSULE | Refills: 0 | Status: SHIPPED | OUTPATIENT
Start: 2020-11-01 | End: 2020-11-04

## 2020-11-01 RX ORDER — KETOROLAC TROMETHAMINE 30 MG/ML
30 INJECTION, SOLUTION INTRAMUSCULAR; INTRAVENOUS ONCE
Status: COMPLETED | OUTPATIENT
Start: 2020-11-01 | End: 2020-11-01

## 2020-11-01 RX ORDER — DIPHENHYDRAMINE HYDROCHLORIDE 50 MG/ML
50 INJECTION INTRAMUSCULAR; INTRAVENOUS ONCE
Status: COMPLETED | OUTPATIENT
Start: 2020-11-01 | End: 2020-11-01

## 2020-11-01 RX ORDER — ONDANSETRON 2 MG/ML
4 INJECTION INTRAMUSCULAR; INTRAVENOUS ONCE
Status: COMPLETED | OUTPATIENT
Start: 2020-11-01 | End: 2020-11-01

## 2020-11-01 RX ADMIN — ONDANSETRON 4 MG: 2 INJECTION INTRAMUSCULAR; INTRAVENOUS at 18:56

## 2020-11-01 RX ADMIN — KETOROLAC TROMETHAMINE 30 MG: 30 INJECTION, SOLUTION INTRAMUSCULAR at 18:58

## 2020-11-01 RX ADMIN — DIPHENHYDRAMINE HYDROCHLORIDE 50 MG: 50 INJECTION, SOLUTION INTRAMUSCULAR; INTRAVENOUS at 19:01

## 2020-11-01 RX ADMIN — SODIUM CHLORIDE 1000 ML: 0.9 INJECTION, SOLUTION INTRAVENOUS at 18:54

## 2020-11-02 ENCOUNTER — VBI (OUTPATIENT)
Dept: FAMILY MEDICINE CLINIC | Facility: CLINIC | Age: 55
End: 2020-11-02

## 2020-11-04 ENCOUNTER — TELEMEDICINE (OUTPATIENT)
Dept: FAMILY MEDICINE CLINIC | Facility: CLINIC | Age: 55
End: 2020-11-04
Payer: COMMERCIAL

## 2020-11-04 DIAGNOSIS — J01.30 ACUTE NON-RECURRENT SPHENOIDAL SINUSITIS: Primary | ICD-10-CM

## 2020-11-04 DIAGNOSIS — R42 DIZZINESS: ICD-10-CM

## 2020-11-04 DIAGNOSIS — E66.01 MORBID OBESITY (HCC): ICD-10-CM

## 2020-11-04 DIAGNOSIS — R11.2 NON-INTRACTABLE VOMITING WITH NAUSEA, UNSPECIFIED VOMITING TYPE: ICD-10-CM

## 2020-11-04 PROBLEM — R07.9 CHEST PAIN: Status: RESOLVED | Noted: 2019-07-02 | Resolved: 2020-11-04

## 2020-11-04 PROCEDURE — 1036F TOBACCO NON-USER: CPT | Performed by: FAMILY MEDICINE

## 2020-11-04 PROCEDURE — 99214 OFFICE O/P EST MOD 30 MIN: CPT | Performed by: FAMILY MEDICINE

## 2020-11-04 RX ORDER — AZITHROMYCIN 250 MG/1
TABLET, FILM COATED ORAL
Qty: 6 TABLET | Refills: 0 | Status: SHIPPED | OUTPATIENT
Start: 2020-11-04 | End: 2020-11-09

## 2020-11-04 RX ORDER — MECLIZINE HYDROCHLORIDE 25 MG/1
25 TABLET ORAL 3 TIMES DAILY PRN
Qty: 30 TABLET | Refills: 0 | Status: ON HOLD | OUTPATIENT
Start: 2020-11-04 | End: 2020-12-23

## 2020-12-15 ENCOUNTER — TELEMEDICINE (OUTPATIENT)
Dept: FAMILY MEDICINE CLINIC | Facility: CLINIC | Age: 55
End: 2020-12-15
Payer: COMMERCIAL

## 2020-12-15 DIAGNOSIS — B34.9 VIRAL INFECTION, UNSPECIFIED: ICD-10-CM

## 2020-12-15 DIAGNOSIS — B34.9 VIRAL INFECTION, UNSPECIFIED: Primary | ICD-10-CM

## 2020-12-15 PROBLEM — R50.9 FEVER: Status: ACTIVE | Noted: 2020-12-15

## 2020-12-15 PROCEDURE — U0003 INFECTIOUS AGENT DETECTION BY NUCLEIC ACID (DNA OR RNA); SEVERE ACUTE RESPIRATORY SYNDROME CORONAVIRUS 2 (SARS-COV-2) (CORONAVIRUS DISEASE [COVID-19]), AMPLIFIED PROBE TECHNIQUE, MAKING USE OF HIGH THROUGHPUT TECHNOLOGIES AS DESCRIBED BY CMS-2020-01-R: HCPCS | Performed by: FAMILY MEDICINE

## 2020-12-15 PROCEDURE — 1036F TOBACCO NON-USER: CPT | Performed by: FAMILY MEDICINE

## 2020-12-15 PROCEDURE — 99213 OFFICE O/P EST LOW 20 MIN: CPT | Performed by: FAMILY MEDICINE

## 2020-12-18 ENCOUNTER — TELEPHONE (OUTPATIENT)
Dept: OTHER | Facility: OTHER | Age: 55
End: 2020-12-18

## 2020-12-18 LAB — SARS-COV-2 RNA SPEC QL NAA+PROBE: DETECTED

## 2020-12-22 ENCOUNTER — TELEMEDICINE (OUTPATIENT)
Dept: FAMILY MEDICINE CLINIC | Facility: CLINIC | Age: 55
End: 2020-12-22
Payer: COMMERCIAL

## 2020-12-22 ENCOUNTER — APPOINTMENT (OUTPATIENT)
Dept: RADIOLOGY | Facility: CLINIC | Age: 55
DRG: 179 | End: 2020-12-22
Payer: COMMERCIAL

## 2020-12-22 ENCOUNTER — HOSPITAL ENCOUNTER (INPATIENT)
Facility: HOSPITAL | Age: 55
LOS: 1 days | Discharge: HOME | DRG: 179 | End: 2020-12-23
Attending: EMERGENCY MEDICINE | Admitting: INTERNAL MEDICINE
Payer: COMMERCIAL

## 2020-12-22 DIAGNOSIS — U07.1 COVID-19 VIRUS INFECTION: Primary | ICD-10-CM

## 2020-12-22 DIAGNOSIS — R05.9 COUGH: ICD-10-CM

## 2020-12-22 DIAGNOSIS — U07.1 LAB TEST POSITIVE FOR DETECTION OF COVID-19 VIRUS: ICD-10-CM

## 2020-12-22 DIAGNOSIS — R05.9 COUGH: Primary | ICD-10-CM

## 2020-12-22 DIAGNOSIS — R09.02 HYPOXIA: ICD-10-CM

## 2020-12-22 PROBLEM — J12.82 PNEUMONIA DUE TO COVID-19 VIRUS: Status: ACTIVE | Noted: 2020-12-22

## 2020-12-22 PROBLEM — D50.9 IRON DEFICIENCY ANEMIA: Status: ACTIVE | Noted: 2020-12-22

## 2020-12-22 LAB
ALBUMIN SERPL BCP-MCNC: 3 G/DL (ref 3.5–5)
ALP SERPL-CCNC: 70 U/L (ref 46–116)
ALT SERPL W P-5'-P-CCNC: 38 U/L (ref 12–78)
ANION GAP SERPL CALCULATED.3IONS-SCNC: 7 MMOL/L (ref 4–13)
APTT PPP: 31 SECONDS (ref 23–37)
AST SERPL W P-5'-P-CCNC: 43 U/L (ref 5–45)
BACTERIA UR QL AUTO: ABNORMAL /HPF
BASOPHILS # BLD AUTO: 0.03 THOUSANDS/ΜL (ref 0–0.1)
BASOPHILS NFR BLD AUTO: 0 % (ref 0–1)
BILIRUB SERPL-MCNC: 0.3 MG/DL (ref 0.2–1)
BILIRUB UR QL STRIP: NEGATIVE
BUN SERPL-MCNC: 14 MG/DL (ref 5–25)
CALCIUM ALBUM COR SERPL-MCNC: 8.7 MG/DL (ref 8.3–10.1)
CALCIUM SERPL-MCNC: 7.9 MG/DL (ref 8.3–10.1)
CHLORIDE SERPL-SCNC: 102 MMOL/L (ref 100–108)
CLARITY UR: ABNORMAL
CO2 SERPL-SCNC: 28 MMOL/L (ref 21–32)
COLOR UR: ABNORMAL
CREAT SERPL-MCNC: 1.2 MG/DL (ref 0.6–1.3)
CRP SERPL QL: 24.9 MG/L
D DIMER PPP FEU-MCNC: 1.64 UG/ML FEU
EOSINOPHIL # BLD AUTO: 0 THOUSAND/ΜL (ref 0–0.61)
EOSINOPHIL NFR BLD AUTO: 0 % (ref 0–6)
ERYTHROCYTE [DISTWIDTH] IN BLOOD BY AUTOMATED COUNT: 13.2 % (ref 11.6–15.1)
GFR SERPL CREATININE-BSD FRML MDRD: 59 ML/MIN/1.73SQ M
GLUCOSE SERPL-MCNC: 95 MG/DL (ref 65–140)
GLUCOSE UR STRIP-MCNC: NEGATIVE MG/DL
HCT VFR BLD AUTO: 46.3 % (ref 34.8–46.1)
HGB BLD-MCNC: 14.4 G/DL (ref 11.5–15.4)
HGB UR QL STRIP.AUTO: ABNORMAL
IMM GRANULOCYTES # BLD AUTO: 0.03 THOUSAND/UL (ref 0–0.2)
IMM GRANULOCYTES NFR BLD AUTO: 0 % (ref 0–2)
INR PPP: 0.95 (ref 0.84–1.19)
KETONES UR STRIP-MCNC: ABNORMAL MG/DL
LDH SERPL-CCNC: 328 U/L (ref 81–234)
LEUKOCYTE ESTERASE UR QL STRIP: NEGATIVE
LYMPHOCYTES # BLD AUTO: 2.07 THOUSANDS/ΜL (ref 0.6–4.47)
LYMPHOCYTES NFR BLD AUTO: 27 % (ref 14–44)
MCH RBC QN AUTO: 28.9 PG (ref 26.8–34.3)
MCHC RBC AUTO-ENTMCNC: 31.1 G/DL (ref 31.4–37.4)
MCV RBC AUTO: 93 FL (ref 82–98)
MONOCYTES # BLD AUTO: 0.49 THOUSAND/ΜL (ref 0.17–1.22)
MONOCYTES NFR BLD AUTO: 6 % (ref 4–12)
NEUTROPHILS # BLD AUTO: 5.12 THOUSANDS/ΜL (ref 1.85–7.62)
NEUTS SEG NFR BLD AUTO: 67 % (ref 43–75)
NITRITE UR QL STRIP: NEGATIVE
NON-SQ EPI CELLS URNS QL MICRO: ABNORMAL /HPF
NRBC BLD AUTO-RTO: 0 /100 WBCS
NT-PROBNP SERPL-MCNC: 15 PG/ML
PH UR STRIP.AUTO: 6 [PH]
PLATELET # BLD AUTO: 264 THOUSANDS/UL (ref 149–390)
PMV BLD AUTO: 10.6 FL (ref 8.9–12.7)
POTASSIUM SERPL-SCNC: 4.1 MMOL/L (ref 3.5–5.3)
PROT SERPL-MCNC: 6.9 G/DL (ref 6.4–8.2)
PROT UR STRIP-MCNC: ABNORMAL MG/DL
PROTHROMBIN TIME: 12.6 SECONDS (ref 11.6–14.5)
RBC # BLD AUTO: 4.98 MILLION/UL (ref 3.81–5.12)
RBC #/AREA URNS AUTO: ABNORMAL /HPF
SODIUM SERPL-SCNC: 137 MMOL/L (ref 136–145)
SP GR UR STRIP.AUTO: 1.02 (ref 1–1.03)
TROPONIN I SERPL-MCNC: <0.02 NG/ML
UROBILINOGEN UR QL STRIP.AUTO: 0.2 E.U./DL
WBC # BLD AUTO: 7.74 THOUSAND/UL (ref 4.31–10.16)
WBC #/AREA URNS AUTO: ABNORMAL /HPF

## 2020-12-22 PROCEDURE — 71046 X-RAY EXAM CHEST 2 VIEWS: CPT

## 2020-12-22 PROCEDURE — 85730 THROMBOPLASTIN TIME PARTIAL: CPT | Performed by: EMERGENCY MEDICINE

## 2020-12-22 PROCEDURE — 83880 ASSAY OF NATRIURETIC PEPTIDE: CPT | Performed by: EMERGENCY MEDICINE

## 2020-12-22 PROCEDURE — 85610 PROTHROMBIN TIME: CPT | Performed by: EMERGENCY MEDICINE

## 2020-12-22 PROCEDURE — 96375 TX/PRO/DX INJ NEW DRUG ADDON: CPT

## 2020-12-22 PROCEDURE — 85025 COMPLETE CBC W/AUTO DIFF WBC: CPT | Performed by: EMERGENCY MEDICINE

## 2020-12-22 PROCEDURE — 87040 BLOOD CULTURE FOR BACTERIA: CPT | Performed by: EMERGENCY MEDICINE

## 2020-12-22 PROCEDURE — XW033E5 INTRODUCTION OF REMDESIVIR ANTI-INFECTIVE INTO PERIPHERAL VEIN, PERCUTANEOUS APPROACH, NEW TECHNOLOGY GROUP 5: ICD-10-PCS | Performed by: INTERNAL MEDICINE

## 2020-12-22 PROCEDURE — 99213 OFFICE O/P EST LOW 20 MIN: CPT | Performed by: FAMILY MEDICINE

## 2020-12-22 PROCEDURE — 82728 ASSAY OF FERRITIN: CPT | Performed by: EMERGENCY MEDICINE

## 2020-12-22 PROCEDURE — 1036F TOBACCO NON-USER: CPT | Performed by: FAMILY MEDICINE

## 2020-12-22 PROCEDURE — 83615 LACTATE (LD) (LDH) ENZYME: CPT | Performed by: EMERGENCY MEDICINE

## 2020-12-22 PROCEDURE — 99285 EMERGENCY DEPT VISIT HI MDM: CPT | Performed by: EMERGENCY MEDICINE

## 2020-12-22 PROCEDURE — 86140 C-REACTIVE PROTEIN: CPT | Performed by: EMERGENCY MEDICINE

## 2020-12-22 PROCEDURE — 85384 FIBRINOGEN ACTIVITY: CPT | Performed by: EMERGENCY MEDICINE

## 2020-12-22 PROCEDURE — 93005 ELECTROCARDIOGRAM TRACING: CPT

## 2020-12-22 PROCEDURE — 81001 URINALYSIS AUTO W/SCOPE: CPT | Performed by: EMERGENCY MEDICINE

## 2020-12-22 PROCEDURE — 83520 IMMUNOASSAY QUANT NOS NONAB: CPT | Performed by: EMERGENCY MEDICINE

## 2020-12-22 PROCEDURE — 96365 THER/PROPH/DIAG IV INF INIT: CPT

## 2020-12-22 PROCEDURE — 99285 EMERGENCY DEPT VISIT HI MDM: CPT

## 2020-12-22 PROCEDURE — 84484 ASSAY OF TROPONIN QUANT: CPT | Performed by: EMERGENCY MEDICINE

## 2020-12-22 PROCEDURE — 85379 FIBRIN DEGRADATION QUANT: CPT | Performed by: EMERGENCY MEDICINE

## 2020-12-22 PROCEDURE — 80053 COMPREHEN METABOLIC PANEL: CPT | Performed by: EMERGENCY MEDICINE

## 2020-12-22 PROCEDURE — 36415 COLL VENOUS BLD VENIPUNCTURE: CPT | Performed by: EMERGENCY MEDICINE

## 2020-12-22 PROCEDURE — 84145 PROCALCITONIN (PCT): CPT | Performed by: EMERGENCY MEDICINE

## 2020-12-22 RX ORDER — AZITHROMYCIN 250 MG/1
TABLET, FILM COATED ORAL
Qty: 6 TABLET | Refills: 0 | Status: SHIPPED | OUTPATIENT
Start: 2020-12-22 | End: 2020-12-23 | Stop reason: HOSPADM

## 2020-12-22 RX ORDER — DEXAMETHASONE SODIUM PHOSPHATE 4 MG/ML
10 INJECTION, SOLUTION INTRA-ARTICULAR; INTRALESIONAL; INTRAMUSCULAR; INTRAVENOUS; SOFT TISSUE ONCE
Status: COMPLETED | OUTPATIENT
Start: 2020-12-22 | End: 2020-12-22

## 2020-12-22 RX ORDER — BENZONATATE 200 MG/1
200 CAPSULE ORAL 3 TIMES DAILY PRN
Qty: 30 CAPSULE | Refills: 0 | Status: SHIPPED | OUTPATIENT
Start: 2020-12-22 | End: 2020-12-23 | Stop reason: HOSPADM

## 2020-12-22 RX ADMIN — DEXAMETHASONE SODIUM PHOSPHATE 10 MG: 4 INJECTION, SOLUTION INTRAMUSCULAR; INTRAVENOUS at 20:41

## 2020-12-22 RX ADMIN — REMDESIVIR 200 MG: 100 INJECTION, POWDER, LYOPHILIZED, FOR SOLUTION INTRAVENOUS at 21:29

## 2020-12-22 RX ADMIN — ENOXAPARIN SODIUM 40 MG: 40 INJECTION SUBCUTANEOUS at 23:16

## 2020-12-23 VITALS
HEART RATE: 70 BPM | SYSTOLIC BLOOD PRESSURE: 117 MMHG | WEIGHT: 236 LBS | OXYGEN SATURATION: 95 % | TEMPERATURE: 96 F | RESPIRATION RATE: 18 BRPM | BODY MASS INDEX: 43.16 KG/M2 | DIASTOLIC BLOOD PRESSURE: 64 MMHG

## 2020-12-23 LAB
ANION GAP SERPL CALCULATED.3IONS-SCNC: 6 MMOL/L (ref 4–13)
BASOPHILS # BLD AUTO: 0.02 THOUSANDS/ΜL (ref 0–0.1)
BASOPHILS NFR BLD AUTO: 0 % (ref 0–1)
BUN SERPL-MCNC: 12 MG/DL (ref 5–25)
CALCIUM SERPL-MCNC: 7.9 MG/DL (ref 8.3–10.1)
CHLORIDE SERPL-SCNC: 104 MMOL/L (ref 100–108)
CO2 SERPL-SCNC: 26 MMOL/L (ref 21–32)
CREAT SERPL-MCNC: 0.84 MG/DL (ref 0.6–1.3)
CRP SERPL QL: 22.7 MG/L
D DIMER PPP FEU-MCNC: 1.14 UG/ML FEU
EOSINOPHIL # BLD AUTO: 0 THOUSAND/ΜL (ref 0–0.61)
EOSINOPHIL NFR BLD AUTO: 0 % (ref 0–6)
ERYTHROCYTE [DISTWIDTH] IN BLOOD BY AUTOMATED COUNT: 13.2 % (ref 11.6–15.1)
FERRITIN SERPL-MCNC: 179 NG/ML (ref 8–388)
FERRITIN SERPL-MCNC: 199 NG/ML (ref 8–388)
FIBRINOGEN PPP-MCNC: 538 MG/DL (ref 227–495)
GFR SERPL CREATININE-BSD FRML MDRD: 91 ML/MIN/1.73SQ M
GLUCOSE SERPL-MCNC: 125 MG/DL (ref 65–140)
HCT VFR BLD AUTO: 42.2 % (ref 34.8–46.1)
HGB BLD-MCNC: 12.9 G/DL (ref 11.5–15.4)
IMM GRANULOCYTES # BLD AUTO: 0.02 THOUSAND/UL (ref 0–0.2)
IMM GRANULOCYTES NFR BLD AUTO: 0 % (ref 0–2)
LACTATE SERPL-SCNC: 0.6 MMOL/L (ref 0.5–2)
LYMPHOCYTES # BLD AUTO: 1.78 THOUSANDS/ΜL (ref 0.6–4.47)
LYMPHOCYTES NFR BLD AUTO: 23 % (ref 14–44)
MCH RBC QN AUTO: 28.7 PG (ref 26.8–34.3)
MCHC RBC AUTO-ENTMCNC: 30.6 G/DL (ref 31.4–37.4)
MCV RBC AUTO: 94 FL (ref 82–98)
MONOCYTES # BLD AUTO: 0.37 THOUSAND/ΜL (ref 0.17–1.22)
MONOCYTES NFR BLD AUTO: 5 % (ref 4–12)
NEUTROPHILS # BLD AUTO: 5.51 THOUSANDS/ΜL (ref 1.85–7.62)
NEUTS SEG NFR BLD AUTO: 72 % (ref 43–75)
NRBC BLD AUTO-RTO: 0 /100 WBCS
PLATELET # BLD AUTO: 270 THOUSANDS/UL (ref 149–390)
PMV BLD AUTO: 10.5 FL (ref 8.9–12.7)
POTASSIUM SERPL-SCNC: 4.1 MMOL/L (ref 3.5–5.3)
PROCALCITONIN SERPL-MCNC: <0.05 NG/ML
PROCALCITONIN SERPL-MCNC: <0.05 NG/ML
RBC # BLD AUTO: 4.5 MILLION/UL (ref 3.81–5.12)
SODIUM SERPL-SCNC: 136 MMOL/L (ref 136–145)
WBC # BLD AUTO: 7.7 THOUSAND/UL (ref 4.31–10.16)

## 2020-12-23 PROCEDURE — 82728 ASSAY OF FERRITIN: CPT | Performed by: PHYSICIAN ASSISTANT

## 2020-12-23 PROCEDURE — 85379 FIBRIN DEGRADATION QUANT: CPT | Performed by: PHYSICIAN ASSISTANT

## 2020-12-23 PROCEDURE — 86140 C-REACTIVE PROTEIN: CPT | Performed by: PHYSICIAN ASSISTANT

## 2020-12-23 PROCEDURE — 87040 BLOOD CULTURE FOR BACTERIA: CPT | Performed by: PHYSICIAN ASSISTANT

## 2020-12-23 PROCEDURE — 84145 PROCALCITONIN (PCT): CPT | Performed by: PHYSICIAN ASSISTANT

## 2020-12-23 PROCEDURE — 97161 PT EVAL LOW COMPLEX 20 MIN: CPT

## 2020-12-23 PROCEDURE — 99239 HOSP IP/OBS DSCHRG MGMT >30: CPT | Performed by: INTERNAL MEDICINE

## 2020-12-23 PROCEDURE — 80048 BASIC METABOLIC PNL TOTAL CA: CPT | Performed by: PHYSICIAN ASSISTANT

## 2020-12-23 PROCEDURE — 83605 ASSAY OF LACTIC ACID: CPT | Performed by: PHYSICIAN ASSISTANT

## 2020-12-23 PROCEDURE — 85025 COMPLETE CBC W/AUTO DIFF WBC: CPT | Performed by: PHYSICIAN ASSISTANT

## 2020-12-23 RX ORDER — MELATONIN
2000 DAILY
Status: DISCONTINUED | OUTPATIENT
Start: 2020-12-23 | End: 2020-12-23 | Stop reason: HOSPADM

## 2020-12-23 RX ORDER — MULTIVITAMIN/IRON/FOLIC ACID 18MG-0.4MG
1 TABLET ORAL DAILY
Status: DISCONTINUED | OUTPATIENT
Start: 2020-12-30 | End: 2020-12-23 | Stop reason: HOSPADM

## 2020-12-23 RX ORDER — MELATONIN
2000 DAILY
Qty: 28 TABLET | Refills: 0 | Status: SHIPPED | OUTPATIENT
Start: 2020-12-24 | End: 2021-01-07

## 2020-12-23 RX ORDER — DEXAMETHASONE SODIUM PHOSPHATE 4 MG/ML
6 INJECTION, SOLUTION INTRA-ARTICULAR; INTRALESIONAL; INTRAMUSCULAR; INTRAVENOUS; SOFT TISSUE EVERY 24 HOURS
Status: DISCONTINUED | OUTPATIENT
Start: 2020-12-23 | End: 2020-12-23

## 2020-12-23 RX ORDER — DEXAMETHASONE SODIUM PHOSPHATE 4 MG/ML
6 INJECTION, SOLUTION INTRA-ARTICULAR; INTRALESIONAL; INTRAMUSCULAR; INTRAVENOUS; SOFT TISSUE EVERY 24 HOURS
Status: DISCONTINUED | OUTPATIENT
Start: 2020-12-23 | End: 2020-12-23 | Stop reason: HOSPADM

## 2020-12-23 RX ORDER — PREDNISONE 10 MG/1
TABLET ORAL
Qty: 30 TABLET | Refills: 0 | Status: SHIPPED | OUTPATIENT
Start: 2020-12-23 | End: 2021-01-04

## 2020-12-23 RX ORDER — SODIUM CHLORIDE 9 MG/ML
75 INJECTION, SOLUTION INTRAVENOUS CONTINUOUS
Status: DISCONTINUED | OUTPATIENT
Start: 2020-12-23 | End: 2020-12-23 | Stop reason: HOSPADM

## 2020-12-23 RX ORDER — ZINC SULFATE 50(220)MG
220 CAPSULE ORAL DAILY
Status: DISCONTINUED | OUTPATIENT
Start: 2020-12-23 | End: 2020-12-23 | Stop reason: HOSPADM

## 2020-12-23 RX ORDER — ACETAMINOPHEN 325 MG/1
650 TABLET ORAL EVERY 6 HOURS PRN
Status: DISCONTINUED | OUTPATIENT
Start: 2020-12-23 | End: 2020-12-23 | Stop reason: HOSPADM

## 2020-12-23 RX ORDER — ASCORBIC ACID 500 MG
1000 TABLET ORAL EVERY 12 HOURS SCHEDULED
Status: DISCONTINUED | OUTPATIENT
Start: 2020-12-23 | End: 2020-12-23 | Stop reason: HOSPADM

## 2020-12-23 RX ORDER — ZINC SULFATE 50(220)MG
220 CAPSULE ORAL DAILY
Qty: 6 CAPSULE | Refills: 0 | Status: SHIPPED | OUTPATIENT
Start: 2020-12-24 | End: 2021-01-07

## 2020-12-23 RX ORDER — BENZONATATE 100 MG/1
200 CAPSULE ORAL 3 TIMES DAILY PRN
Status: DISCONTINUED | OUTPATIENT
Start: 2020-12-23 | End: 2020-12-23 | Stop reason: HOSPADM

## 2020-12-23 RX ADMIN — OXYCODONE HYDROCHLORIDE AND ACETAMINOPHEN 1000 MG: 500 TABLET ORAL at 01:55

## 2020-12-23 RX ADMIN — ZINC SULFATE 220 MG (50 MG) CAPSULE 220 MG: CAPSULE at 09:45

## 2020-12-23 RX ADMIN — SODIUM CHLORIDE 75 ML/HR: 0.9 INJECTION, SOLUTION INTRAVENOUS at 02:32

## 2020-12-23 RX ADMIN — REMDESIVIR 100 MG: 5 INJECTION INTRAVENOUS at 16:22

## 2020-12-23 RX ADMIN — ENOXAPARIN SODIUM 40 MG: 40 INJECTION SUBCUTANEOUS at 11:48

## 2020-12-23 RX ADMIN — Medication 2000 UNITS: at 09:45

## 2020-12-23 RX ADMIN — OXYCODONE HYDROCHLORIDE AND ACETAMINOPHEN 1000 MG: 500 TABLET ORAL at 09:45

## 2020-12-23 NOTE — ASSESSMENT & PLAN NOTE
Patient started with some SOB and fevers on 12/14, and test resulted positive mitral/15  Her  was also sick at home  Has had fevers off and on since then and continues with a cough and SOB    She states that her SOB is really only with exertion, otherwise has no SOB at rest   - SpO2 93% on room air at rest, but desats to 70s with ambulation and patient becomes very symptomatic at this time with lightheadedness, SOB  - check CBC and CMP daily  - D-dimer elevated at 1 64 continue to trend, CRP 24 9  - continue vitamin D3, vitamin-C, zinc  - dexamethasone 6 mg IV daily x10d  - relative severe to 100 mg IV daily 1 followed by 100 mg IV daily x4 days  - DVT prophylaxis with Lovenox 40 mg q 12

## 2020-12-23 NOTE — PLAN OF CARE
Problem: Potential for Falls  Goal: Patient will remain free of falls  Description: INTERVENTIONS:  - Assess patient frequently for physical needs  -  Identify cognitive and physical deficits and behaviors that affect risk of falls    -  [place] fall precautions as indicated by assessment   - Educate patient/family on patient safety including physical limitations  - Instruct patient to call for assistance with activity based on assessment  - Modify environment to reduce risk of injury  - Consider OT/PT consult to assist with strengthening/mobility  Outcome: Progressing     Problem: PAIN - ADULT  Goal: Verbalizes/displays adequate comfort level or baseline comfort level  Description: Interventions:  - Encourage patient to monitor pain and request assistance  - Assess pain using appropriate pain scale  - Administer analgesics based on type and severity of pain and evaluate response  - Implement non-pharmacological measures as appropriate and evaluate response  - Consider cultural and social influences on pain and pain management  - Notify physician/advanced practitioner if interventions unsuccessful or patient reports new pain  Outcome: Progressing     Problem: INFECTION - ADULT  Goal: Absence or prevention of progression during hospitalization  Description: INTERVENTIONS:  - Assess and monitor for signs and symptoms of infection  - Monitor lab/diagnostic results  - Monitor all insertion sites, i e  indwelling lines, tubes, and drains  - [place] appropriate cooling/warming therapies per order  - Administer medications as ordered  - Instruct and encourage patient and family to use good hand hygiene technique  - Identify and instruct in appropriate isolation precautions for identified infection/condition  Outcome: Progressing     Problem: SAFETY ADULT  Goal: Patient will remain free of falls  Description: INTERVENTIONS:  - Assess patient frequently for physical needs  -  Identify cognitive and physical deficits and behaviors that affect risk of falls    -  [place] fall precautions as indicated by assessment   - Educate patient/family on patient safety including physical limitations  - Instruct patient to call for assistance with activity based on assessment  - Modify environment to reduce risk of injury  - Consider OT/PT consult to assist with strengthening/mobility  Outcome: Progressing  Goal: Maintain or return to baseline ADL function  Description: INTERVENTIONS:  -  Assess patient's ability to carry out ADLs; assess patient's baseline for ADL function and identify physical deficits which impact ability to perform ADLs (bathing, care of mouth/teeth, toileting, grooming, dressing, etc )  - Assess/evaluate cause of self-care deficits   - Assess range of motion  - Assess patient's mobility; develop plan if impaired  - Assess patient's need for assistive devices and provide as appropriate  - Encourage maximum independence but intervene and supervise when necessary  - Involve family in performance of ADLs  - Assess for home care needs following discharge   - Consider OT consult to assist with ADL evaluation and planning for discharge  - Provide patient education as appropriate  Outcome: Progressing  Goal: Maintain or return mobility status to optimal level  Description: INTERVENTIONS:  - Assess patient's baseline mobility status (ambulation, transfers, stairs, etc )    - Identify cognitive and physical deficits and behaviors that affect mobility  - Identify mobility aids required to assist with transfers and/or ambulation (gait belt, sit-to-stand, lift, walker, cane, etc )  - [place] fall precautions as indicated by assessment  - Record patient progress and toleration of activity level on Mobility SBAR; progress patient to next Phase/Stage  - Instruct patient to call for assistance with activity based on assessment  - Consider rehabilitation consult to assist with strengthening/weightbearing, etc   Outcome: Progressing Problem: DISCHARGE PLANNING  Goal: Discharge to home or other facility with appropriate resources  Description: INTERVENTIONS:  - Identify barriers to discharge w/patient and caregiver  - Arrange for needed discharge resources and transportation as appropriate  - Identify discharge learning needs (meds, wound care, etc )  - Arrange for interpretive services to assist at discharge as needed  - Refer to Case Management Department for coordinating discharge planning if the patient needs post-hospital services based on physician/advanced practitioner order or complex needs related to functional status, cognitive ability, or social support system  Outcome: Progressing     Problem: Knowledge Deficit  Goal: Patient/family/caregiver demonstrates understanding of disease process, treatment plan, medications, and discharge instructions  Description: Complete learning assessment and assess knowledge base    Interventions:  - Provide teaching at level of understanding  - Provide teaching via preferred learning methods  Outcome: Progressing     Problem: RESPIRATORY - ADULT  Goal: Achieves optimal ventilation and oxygenation  Description: INTERVENTIONS:  - Assess for changes in respiratory status  - Assess for changes in mentation and behavior  - Position to facilitate oxygenation and minimize respiratory effort  - Oxygen administered by appropriate delivery if ordered  - Initiate smoking cessation education as indicated  - Encourage broncho-pulmonary hygiene including cough, deep breathe, Incentive Spirometry  - Assess the need for suctioning and aspirate as needed  - Assess and instruct to report SOB or any respiratory difficulty  - Respiratory Therapy support as indicated  Outcome: Progressing

## 2020-12-23 NOTE — PLAN OF CARE
Problem: Potential for Falls  Goal: Patient will remain free of falls  Description: INTERVENTIONS:  - Assess patient frequently for physical needs  -  Identify cognitive and physical deficits and behaviors that affect risk of falls    -  [place] fall precautions as indicated by assessment   - Educate patient/family on patient safety including physical limitations  - Instruct patient to call for assistance with activity based on assessment  - Modify environment to reduce risk of injury  - Consider OT/PT consult to assist with strengthening/mobility  Outcome: Progressing     Problem: PAIN - ADULT  Goal: Verbalizes/displays adequate comfort level or baseline comfort level  Description: Interventions:  - Encourage patient to monitor pain and request assistance  - Assess pain using appropriate pain scale  - Administer analgesics based on type and severity of pain and evaluate response  - Implement non-pharmacological measures as appropriate and evaluate response  - Consider cultural and social influences on pain and pain management  - Notify physician/advanced practitioner if interventions unsuccessful or patient reports new pain  Outcome: Progressing     Problem: INFECTION - ADULT  Goal: Absence or prevention of progression during hospitalization  Description: INTERVENTIONS:  - Assess and monitor for signs and symptoms of infection  - Monitor lab/diagnostic results  - Monitor all insertion sites, i e  indwelling lines, tubes, and drains  - [place] appropriate cooling/warming therapies per order  - Administer medications as ordered  - Instruct and encourage patient and family to use good hand hygiene technique  - Identify and instruct in appropriate isolation precautions for identified infection/condition  Outcome: Progressing     Problem: SAFETY ADULT  Goal: Patient will remain free of falls  Description: INTERVENTIONS:  - Assess patient frequently for physical needs  -  Identify cognitive and physical deficits and behaviors that affect risk of falls    -  [place] fall precautions as indicated by assessment   - Educate patient/family on patient safety including physical limitations  - Instruct patient to call for assistance with activity based on assessment  - Modify environment to reduce risk of injury  - Consider OT/PT consult to assist with strengthening/mobility  Outcome: Progressing  Goal: Maintain or return to baseline ADL function  Description: INTERVENTIONS:  -  Assess patient's ability to carry out ADLs; assess patient's baseline for ADL function and identify physical deficits which impact ability to perform ADLs (bathing, care of mouth/teeth, toileting, grooming, dressing, etc )  - Assess/evaluate cause of self-care deficits   - Assess range of motion  - Assess patient's mobility; develop plan if impaired  - Assess patient's need for assistive devices and provide as appropriate  - Encourage maximum independence but intervene and supervise when necessary  - Involve family in performance of ADLs  - Assess for home care needs following discharge   - Consider OT consult to assist with ADL evaluation and planning for discharge  - Provide patient education as appropriate  Outcome: Progressing  Goal: Maintain or return mobility status to optimal level  Description: INTERVENTIONS:  - Assess patient's baseline mobility status (ambulation, transfers, stairs, etc )    - Identify cognitive and physical deficits and behaviors that affect mobility  - Identify mobility aids required to assist with transfers and/or ambulation (gait belt, sit-to-stand, lift, walker, cane, etc )  - [place] fall precautions as indicated by assessment  - Record patient progress and toleration of activity level on Mobility SBAR; progress patient to next Phase/Stage  - Instruct patient to call for assistance with activity based on assessment  - Consider rehabilitation consult to assist with strengthening/weightbearing, etc   Outcome: Progressing Problem: DISCHARGE PLANNING  Goal: Discharge to home or other facility with appropriate resources  Description: INTERVENTIONS:  - Identify barriers to discharge w/patient and caregiver  - Arrange for needed discharge resources and transportation as appropriate  - Identify discharge learning needs (meds, wound care, etc )  - Arrange for interpretive services to assist at discharge as needed  - Refer to Case Management Department for coordinating discharge planning if the patient needs post-hospital services based on physician/advanced practitioner order or complex needs related to functional status, cognitive ability, or social support system  Outcome: Progressing     Problem: Knowledge Deficit  Goal: Patient/family/caregiver demonstrates understanding of disease process, treatment plan, medications, and discharge instructions  Description: Complete learning assessment and assess knowledge base    Interventions:  - Provide teaching at level of understanding  - Provide teaching via preferred learning methods  Outcome: Progressing     Problem: RESPIRATORY - ADULT  Goal: Achieves optimal ventilation and oxygenation  Description: INTERVENTIONS:  - Assess for changes in respiratory status  - Assess for changes in mentation and behavior  - Position to facilitate oxygenation and minimize respiratory effort  - Oxygen administered by appropriate delivery if ordered  - Initiate smoking cessation education as indicated  - Encourage broncho-pulmonary hygiene including cough, deep breathe, Incentive Spirometry  - Assess the need for suctioning and aspirate as needed  - Assess and instruct to report SOB or any respiratory difficulty  - Respiratory Therapy support as indicated  Outcome: Progressing

## 2020-12-23 NOTE — ED PROVIDER NOTES
History  Chief Complaint   Patient presents with    Shortness of Breath     Patient is confirmed covid  Had CXR done today and was sent to ED  States she is feeling very tired and has been coughing alot for 2 days  No Tylenol today     Patient is a 55-year-old female  She is been symptomatic for COVID since he days ago  She does have a positive COVID test   Her cough became pretty bad she developed shortness of breath  No chest pain  She was referred to urgent care  Chest x-ray was ordered  She was found to have low oxygen saturations and sent to the emergency room  She has been having fever  No hemoptysis, calf pain or unilateral leg swelling  Comorbidities include obesity  Symptoms are moderately severe  Worse with exertion  Improves with rest   Patient does improved taking vitamins at home  Prior to Admission Medications   Prescriptions Last Dose Informant Patient Reported? Taking?    Acetaminophen 500 MG  Self Yes No   Sig: Take 1,000 mg by mouth every 6 (six) hours as needed for mild pain    Multiple Vitamin (MULTIVITAMIN) capsule  Self Yes No   Sig: Take 1 capsule by mouth daily Last dose 20   Probiotic Product (PROBIOTIC-10 PO)  Self Yes No   Sig: Take by mouth every morning    azithromycin (ZITHROMAX) 250 mg tablet   No No   Sig: Take 500mg on day 1, 250mg on days 2-5   benzonatate (TESSALON) 200 MG capsule   No No   Sig: Take 1 capsule (200 mg total) by mouth 3 (three) times a day as needed for cough   butalbital-acetaminophen-caffeine (FIORICET,ESGIC) -40 mg per tablet   No No   Sig: Take 1 tablet by mouth every 6 (six) hours as needed for headaches or migraine   cyclobenzaprine (FLEXERIL) 10 mg tablet   No No   Sig: Take 1 tablet (10 mg total) by mouth daily at bedtime as needed for muscle spasms   Patient not taking: Reported on 2020   fluticasone (FLONASE) 50 mcg/act nasal spray   No No   Si sprays into each nostril as needed for rhinitis   meclizine (ANTIVERT) 25 mg tablet   No No   Sig: Take 1 tablet (25 mg total) by mouth 3 (three) times a day as needed for dizziness      Facility-Administered Medications: None       Past Medical History:   Diagnosis Date    Constipation     on occ    Dizziness     inner ear    Headache     History of duodenal ulcer     History of iron deficiency anemia     History of palpitations     History of UTI     with hematuria    Neck pain     Obesity     Refusal of blood transfusions as patient is Sabianist     Wears glasses     will wear occ for reading       Past Surgical History:   Procedure Laterality Date    BARTHOLIN GLAND CYST EXCISION       SECTION      0291,[id number]    COLONOSCOPY      - "no polpyps"    CYSTOSCOPY  2015    diagnostic / Managed by Vidya Mcpherson / dawson 7/29/15     DILATION AND CURETTAGE OF UTERUS      HYSTERECTOMY      partial-ovaries remain       Family History   Problem Relation Age of Onset    Colon cancer Mother     Hypertension Mother     Hypothyroidism Mother     Cancer Mother         colon w/mets    Prostate cancer Father     Cancer Father         prostate    Diabetes Sister         3 sisters    Breast cancer additional onset Cousin     Heart disease Neg Hx     Stroke Neg Hx      I have reviewed and agree with the history as documented  E-Cigarette/Vaping    E-Cigarette Use Never User      E-Cigarette/Vaping Substances    Nicotine No     THC No     CBD No     Flavoring No     Other No     Unknown No      Social History     Tobacco Use    Smoking status: Former Smoker     Quit date:      Years since quittin 9    Smokeless tobacco: Never Used   Substance Use Topics    Alcohol use: No     Comment: non drinker/ no alcohol use     Drug use: No       Review of Systems   Constitutional: Positive for chills and fever  HENT: Negative for rhinorrhea and sore throat  Eyes: Negative for pain, redness and visual disturbance     Respiratory: Positive for cough and shortness of breath  Cardiovascular: Negative for chest pain and leg swelling  Gastrointestinal: Negative for abdominal pain, diarrhea and vomiting  Endocrine: Negative for polydipsia and polyuria  Genitourinary: Negative for dysuria, frequency, hematuria, vaginal bleeding and vaginal discharge  Musculoskeletal: Negative for back pain and neck pain  Skin: Negative for rash and wound  Allergic/Immunologic: Negative for immunocompromised state  Neurological: Negative for weakness, numbness and headaches  Hematological: Does not bruise/bleed easily  Psychiatric/Behavioral: Negative for hallucinations and suicidal ideas  All other systems reviewed and are negative  Physical Exam  Physical Exam  Vitals signs reviewed  Constitutional:       General: She is not in acute distress  Appearance: She is well-developed  She is obese  HENT:      Head: Normocephalic and atraumatic  Nose: Nose normal       Mouth/Throat:      Mouth: Mucous membranes are moist    Eyes:      General:         Right eye: No discharge  Left eye: No discharge  Conjunctiva/sclera: Conjunctivae normal    Neck:      Musculoskeletal: Normal range of motion and neck supple  No neck rigidity  Cardiovascular:      Rate and Rhythm: Regular rhythm  Tachycardia present  Pulses: Normal pulses  Heart sounds: Normal heart sounds  No murmur  No friction rub  No gallop  Pulmonary:      Effort: Pulmonary effort is normal  Tachypnea present  No respiratory distress  Breath sounds: Normal breath sounds  No stridor  No decreased breath sounds, wheezing, rhonchi or rales  Abdominal:      General: Bowel sounds are normal  There is no distension  Palpations: Abdomen is soft  Tenderness: There is no abdominal tenderness  There is no right CVA tenderness, left CVA tenderness, guarding or rebound  Musculoskeletal: Normal range of motion           General: No swelling, tenderness, deformity or signs of injury  Right lower leg: She exhibits no tenderness  No edema  Left lower leg: She exhibits no tenderness  No edema  Comments: No calf tenderness or unilateral leg swelling  Skin:     General: Skin is warm and dry  Coloration: Skin is not jaundiced  Findings: No rash  Neurological:      General: No focal deficit present  Mental Status: She is alert and oriented to person, place, and time  Sensory: No sensory deficit  Motor: Motor function is intact     Psychiatric:         Mood and Affect: Mood normal          Behavior: Behavior normal          Vital Signs  ED Triage Vitals [12/22/20 1910]   Temperature Pulse Respirations Blood Pressure SpO2   (!) 100 6 °F (38 1 °C) (!) 106 20 136/66 95 %      Temp Source Heart Rate Source Patient Position - Orthostatic VS BP Location FiO2 (%)   Tympanic Monitor Sitting Left arm --      Pain Score       --           Vitals:    12/22/20 1910 12/22/20 2130 12/22/20 2200 12/22/20 2230   BP: 136/66 131/72 142/69 139/67   Pulse: (!) 106 100 92 92   Patient Position - Orthostatic VS: Sitting            Visual Acuity      ED Medications  Medications   remdesivir (Veklury) 200 mg in sodium chloride 0 9 % 250 mL IVPB (0 mg Intravenous Stopped 12/22/20 2214)     Followed by   remdesivir Jodeen Abraham) 100 mg in sodium chloride 0 9 % 250 mL IVPB (has no administration in time range)   enoxaparin (LOVENOX) subcutaneous injection 40 mg (has no administration in time range)   dexamethasone (DECADRON) injection 10 mg (10 mg Intravenous Given 12/22/20 2041)       Diagnostic Studies  Results Reviewed     Procedure Component Value Units Date/Time    D-dimer, quantitative [[id number]]  (Abnormal) Collected: 12/22/20 2131    Lab Status: Final result Specimen: Blood from Arm, Left Updated: 12/22/20 2219     D-Dimer, Quant 1 64 ug/ml FEU     Protime-INR [[id number]]  (Normal) Collected: 12/22/20 2131    Lab Status: Final result Specimen: Blood from Arm, Left Updated: 12/22/20 2212     Protime 12 6 seconds      INR 0 95    APTT [[id number]]  (Normal) Collected: 12/22/20 2131    Lab Status: Final result Specimen: Blood from Arm, Left Updated: 12/22/20 2212     PTT 31 seconds     LD,Blood [[id number]]  (Abnormal) Collected: 12/22/20 2131    Lab Status: Final result Specimen: Blood from Arm, Left Updated: 12/22/20 2200      U/L     C-reactive protein [[id number]]  (Abnormal) Collected: 12/22/20 2131    Lab Status: Final result Specimen: Blood from Arm, Left Updated: 12/22/20 2200     CRP 24 9 mg/L     NT-BNP PRO [[id number]]  (Normal) Collected: 12/22/20 2131    Lab Status: Final result Specimen: Blood from Arm, Left Updated: 12/22/20 2200     NT-proBNP 15 pg/mL     Comprehensive metabolic panel [[id number]]  (Abnormal) Collected: 12/22/20 2131    Lab Status: Final result Specimen: Blood from Arm, Left Updated: 12/22/20 2154     Sodium 137 mmol/L      Potassium 4 1 mmol/L      Chloride 102 mmol/L      CO2 28 mmol/L      ANION GAP 7 mmol/L      BUN 14 mg/dL      Creatinine 1 20 mg/dL      Glucose 95 mg/dL      Calcium 7 9 mg/dL      Corrected Calcium 8 7 mg/dL      AST 43 U/L      ALT 38 U/L      Alkaline Phosphatase 70 U/L      Total Protein 6 9 g/dL      Albumin 3 0 g/dL      Total Bilirubin 0 30 mg/dL      eGFR 59 ml/min/1 73sq m     Narrative:      [place] guidelines for Chronic Kidney Disease (CKD):     Stage 1 with normal or high GFR (GFR > 90 mL/min/1 73 square meters)    Stage 2 Mild CKD (GFR = 60-89 mL/min/1 73 square meters)    Stage 3A Moderate CKD (GFR = 45-59 mL/min/1 73 square meters)    Stage 3B Moderate CKD (GFR = 30-44 mL/min/1 73 square meters)    Stage 4 Severe CKD (GFR = 15-29 mL/min/1 73 square meters)    Stage 5 End Stage CKD (GFR <15 mL/min/1 73 square meters)  Note: GFR calculation is accurate only with a steady state creatinine    Interleukin-6,Serum [[id number]] Collected: 12/22/20 2131    Lab Status: In process Specimen: Blood from Arm, Left Updated: 12/22/20 2136    Troponin I [[id number]]  (Normal) Collected: 12/22/20 2036    Lab Status: Final result Specimen: Blood from Arm, Left Updated: 12/22/20 2112     Troponin I <0 02 ng/mL     CBC and differential [[id number]]  (Abnormal) Collected: 12/22/20 2036    Lab Status: Final result Specimen: Blood from Arm, Left Updated: 12/22/20 2049     WBC 7 74 Thousand/uL      RBC 4 98 Million/uL      Hemoglobin 14 4 g/dL      Hematocrit 46 3 %      MCV 93 fL      MCH 28 9 pg      MCHC 31 1 g/dL      RDW 13 2 %      MPV 10 6 fL      Platelets 727 Thousands/uL      nRBC 0 /100 WBCs      Neutrophils Relative 67 %      Immat GRANS % 0 %      Lymphocytes Relative 27 %      Monocytes Relative 6 %      Eosinophils Relative 0 %      Basophils Relative 0 %      Neutrophils Absolute 5 12 Thousands/µL      Immature Grans Absolute 0 03 Thousand/uL      Lymphocytes Absolute 2 07 Thousands/µL      Monocytes Absolute 0 49 Thousand/µL      Eosinophils Absolute 0 00 Thousand/µL      Basophils Absolute 0 03 Thousands/µL     Blood culture #2 [[id number]] Collected: 12/22/20 2036    Lab Status: In process Specimen: Blood from Arm, Right Updated: 12/22/20 2047    Blood culture #1 [[id number]] Collected: 12/22/20 2036    Lab Status: In process Specimen: Blood from Arm, Left Updated: 12/22/20 2047    Ferritin [[id number]] Collected: 12/22/20 2036    Lab Status: In process Specimen: Blood from Arm, Left Updated: 12/22/20 2047    Procalcitonin [[id number]] Collected: 12/22/20 2036    Lab Status: In process Specimen: Blood from Arm, Left Updated: 12/22/20 2046    Fibrinogen [[id number]] Collected: 12/22/20 2036    Lab Status:  In process Specimen: Blood from Arm, Left Updated: 12/22/20 2046    UA w Reflex to Microscopic w Reflex to Culture [[id number]]     Lab Status: No result Specimen: Urine                  No orders to display              Procedures  ECG 12 Lead Documentation Only    Date/Time: 12/22/2020 8:17 PM  Performed by: Angela Brewer MD  Authorized by: Angela Brewer MD     ECG reviewed by me, the ED Provider: yes    Patient location:  ED  Interpretation:     Interpretation: normal    Rate:     ECG rate assessment: normal    Rhythm:     Rhythm: sinus rhythm    Ectopy:     Ectopy: none    QRS:     QRS axis:  Normal  Conduction:     Conduction: normal    ST segments:     ST segments:  Normal  T waves:     T waves: normal               ED Course  ED Course as of Dec 22 2254   Tue Dec 22, 2020   1921 Poor inspiratory effort  No definite infiltrates  MDM  Number of Diagnoses or Management Options  Diagnosis management comments: At rest   Room air sat was 93%  With ambulation, her saturations dropped to 70%  She became very dyspneic after short walk  No definite infiltrates on the chest x-ray  Oxygen administered  Steroids administered  Remdesivir ordered  D-dimer was elevated  Anticoagulation ordered (category C)  Consulted with hospitalist for admission         Amount and/or Complexity of Data Reviewed  Clinical lab tests: ordered and reviewed  Tests in the radiology section of CPT®: ordered and reviewed  Discuss the patient with other providers: yes  Independent visualization of images, tracings, or specimens: yes        Disposition  Final diagnoses:   COVID-19 virus infection   Hypoxia     Time reflects when diagnosis was documented in both MDM as applicable and the Disposition within this note     Time User Action Codes Description Comment    12/22/2020 10:53 PM Luis Alberto Pandya Add [U07 1] COVID-19 virus infection     12/22/2020 10:54 PM Luis Alberto Pandya Add [R09 02] Hypoxia       ED Disposition     ED Disposition Condition Date/Time Comment    Admit Stable Tue Dec 22, 2020 10:53 PM       Follow-up Information    None         Patient's Medications   Discharge Prescriptions    No medications on file     No discharge procedures on file     PDMP Review     None          ED Provider  Electronically Signed by           Georgi Chirinos MD  12/22/20 8155

## 2020-12-23 NOTE — DISCHARGE SUMMARY
Discharge- Ej Finn 1965, 48 y o  female MRN: [id number]    Unit/Bed#: [address] [address] Encounter: [id number]    Primary Care Provider: Altaf Newman DO   Date and time admitted to hospital: 12/22/2020  7:01 PM        * Pneumonia due to COVID-19 virus  Assessment & Plan  Patient started with some SOB and fevers on 12/14, and test resulted positive mitral/15  Her  was also sick at home  Has had fevers off and on since then and continues with a cough and SOB  She states that her SOB is really only with exertion, otherwise has no SOB at rest   - SpO2 93% on room air at rest, but desats to 70s with ambulation and patient becomes very symptomatic at this time with lightheadedness, SOB  - D-dimer elevated at 1 64 continue to trend, CRP 24 9  D-dimer improved to 1 14 and CRP down to 22 7  - continue vitamin D3, vitamin-C, zinc  Patient received Decadron while hospitalized  Patient was also given couple of doses of remdesivir during the hospital stay  Patient received DVT prophylaxis with Lovenox 40 milligrams subcutaneously b i d    Patient is feeling much better and O2 saturation continued to remain in mid 90s off oxygen and low 90s on ambulation without any symptoms  Patient to be discharged on prednisone taper-discussed in detail with patient to monitor O2 saturation and return to hospital if patient gets hypoxic        Discharging Physician / Practitioner: Jacquelyn Jaquez MD  PCP: Altaf Newman DO  Admission Date:   Admission Orders (From admission, onward)     Ordered        12/22/20 2254  Inpatient Admission  Once                   Discharge Date: 12/23/20    Resolved Problems  Date Reviewed: 12/23/2020    None           Outpatient Tests Requested:  · Follow-up with PCP 2-3 days    Complications:  None    Reason for Admission:  Shortness of breath    Hospital Course:     Ej Finn is a 48 y o  female patient who originally presented to the hospital on 12/22/2020 due to shortness of breath and dizziness  Patient has known history of COVID diagnosed on December 15, 2020  Patient presented yesterday with shortness of breath, fevers and cough  In the ED patient's O2 saturation was 93% on room air but supposedly patient was noted to have low O2 saturations in the urgent care center  Chest x-ray showed minimal bibasilar subsegmental atelectasis  Patient was treated with remdesivir and IV Decadron  Patient remained stable without any further shortness of breath with occasional cough  Patient was ambulated in the room without any evidence of desaturation and patient remained asymptomatic without any further shortness of breath or dizziness  Patient to be discharged on prednisone taper  Patient advised to monitor her O2 saturations closely and return to the hospital if patient's O2 saturations less than 90%  Patient will follow-up with PCP in 3 days  Please see above list of diagnoses and related plan for additional information  Condition at Discharge: stable     Discharge Day Visit / Exam:     Subjective:  Patient is feeling better  Denies any shortness of breath  Occasional cough  Patient denies any chest pain or dizziness  Vitals: Blood Pressure: 117/64 (12/23/20 1700)  Pulse: 70 (12/23/20 1700)  Temperature: (!) 96 °F (35 6 °C) (12/23/20 1700)  Temp Source: Tympanic (12/23/20 1700)  Respirations: 18 (12/23/20 1700)  Weight - Scale: 107 kg (236 lb)(last weight used ) (12/22/20 1910)  SpO2: 95 % (12/23/20 1712)  Exam:   Physical Exam  Constitutional:       Appearance: Normal appearance  HENT:      Head: Normocephalic and atraumatic  Nose: Nose normal       Mouth/Throat:      Mouth: Mucous membranes are moist       Pharynx: Oropharynx is clear  Eyes:      Extraocular Movements: Extraocular movements intact  Pupils: Pupils are equal, round, and reactive to light  Neck:      Musculoskeletal: Normal range of motion and neck supple     Cardiovascular: Rate and Rhythm: Normal rate and regular rhythm  Pulmonary:      Effort: Pulmonary effort is normal       Breath sounds: Normal breath sounds  Abdominal:      General: Bowel sounds are normal  There is no distension  Palpations: Abdomen is soft  Tenderness: There is no abdominal tenderness  Musculoskeletal:         General: No swelling  Skin:     General: Skin is warm and dry  Neurological:      General: No focal deficit present  Mental Status: She is alert  Discharge instructions/Information to patient and family:   See after visit summary for information provided to patient and family  Provisions for Follow-Up Care:  See after visit summary for information related to follow-up care and any pertinent home health orders  Disposition:     Home      Planned Readmission: No     Discharge Statement:  I spent 35 minutes discharging the patient  This time was spent on the day of discharge  I had direct contact with the patient on the day of discharge  Greater than 50% of the total time was spent examining patient, answering all patient questions, arranging and discussing plan of care with patient as well as directly providing post-discharge instructions  Additional time then spent on discharge activities  Discharge Medications:  See after visit summary for reconciled discharge medications provided to patient and family        ** Please Note: This note has been constructed using a voice recognition system **

## 2020-12-23 NOTE — CASE MANAGEMENT
Pt is a tentative discharge home today  Per chart review RN and provider rounds:   pt admitted with fatigue and cough after COVID exposure, pt was being treated for COVID symptoms, pt is A&Ox4, independent PTA , lives with her spouse, was able to be weaned off oxygen, pt is employed and drives  No discharge identified at this time  CM will continue to follow for care coordination and update assessment as appropriate

## 2020-12-23 NOTE — NURSING NOTE
Patient provided verbal refusal for pneumococcal and influenza vaccinations  Education was provided   Patient persisted in refusal

## 2020-12-23 NOTE — DISCHARGE INSTRUCTIONS
[address]    Your healthcare provider and/or public health staff have evaluated you and have determined that you do not need to remain in the hospital at this time  At this time you can be isolated at home where you will be monitored by staff from your local or state health department  You should carefully follow the prevention and isolation steps below until a healthcare provider or local or state health department says that you can return to your normal activities  Stay home except to get medical care    People who are mildly ill with COVID-19 are able to isolate at home during their illness  You should restrict activities outside your home, except for getting medical care  Do not go to work, school, or public areas  Avoid using public transportation, ride-sharing, or taxis  Separate yourself from other people and animals in your home    People: As much as possible, you should stay in a specific room and away from other people in your home  Also, you should use a separate bathroom, if available  Animals: You should restrict contact with pets and other animals while you are sick with COVID-19, just like you would around other people  Although there have not been reports of pets or other animals becoming sick with COVID-19, it is still recommended that people sick with COVID-19 limit contact with animals until more information is known about the virus  When possible, have another member of your household care for your animals while you are sick  If you are sick with COVID-19, avoid contact with your pet, including petting, snuggling, being kissed or licked, and sharing food  If you must care for your pet or be around animals while you are sick, wash your hands before and after you interact with pets and wear a facemask  See COVID-19 and Animals for more information      Call ahead before visiting your doctor    If you have a medical appointment, call the healthcare provider and tell them that you have or may have COVID-19  This will help the healthcare providers office take steps to keep other people from getting infected or exposed  Wear a facemask    You should wear a facemask when you are around other people (e g , sharing a room or vehicle) or pets and before you enter a healthcare providers office  If you are not able to wear a facemask (for example, because it causes trouble breathing), then people who live with you should not stay in the same room with you, or they should wear a facemask if they enter your room  Cover your coughs and sneezes    Cover your mouth and nose with a tissue when you cough or sneeze  Throw used tissues in a lined trash can  Immediately wash your hands with soap and water for at least 20 seconds or, if soap and water are not available, clean your hands with an alcohol-based hand  that contains at least 60% alcohol  Clean your hands often    Wash your hands often with soap and water for at least 20 seconds, especially after blowing your nose, coughing, or sneezing; going to the bathroom; and before eating or preparing food  If soap and water are not readily available, use an alcohol-based hand  with at least 60% alcohol, covering all surfaces of your hands and rubbing them together until they feel dry  Soap and water are the best option if hands are visibly dirty  Avoid touching your eyes, nose, and mouth with unwashed hands  Avoid sharing personal household items    You should not share dishes, drinking glasses, cups, eating utensils, towels, or bedding with other people or pets in your home  After using these items, they should be washed thoroughly with soap and water  Clean all high-touch surfaces everyday    High touch surfaces include counters, tabletops, doorknobs, bathroom fixtures, toilets, phones, keyboards, tablets, and bedside tables  Also, clean any surfaces that may have blood, stool, or body fluids on them   Use a household cleaning spray or wipe, according to the label instructions  Labels contain instructions for safe and effective use of the cleaning product including precautions you should take when applying the product, such as wearing gloves and making sure you have good ventilation during use of the product  Monitor your symptoms    Seek prompt medical attention if your illness is worsening (e g , difficulty breathing)  Before seeking care, call your healthcare provider and tell them that you have, or are being evaluated for, COVID-19  Put on a facemask before you enter the facility  These steps will help the healthcare providers office to keep other people in the office or waiting room from getting infected or exposed  Ask your healthcare provider to call the local or [facility] health department  Persons who are placed under active monitoring or facilitated self-monitoring should follow instructions provided by their local health department or occupational health professionals, as appropriate  If you have a medical emergency and need to call 911, notify the dispatch personnel that you have, or are being evaluated for COVID-19  If possible, put on a facemask before emergency medical services arrive      Discontinuing home isolation    Patients with confirmed COVID-19 should remain under home isolation precautions until the following conditions are met:   - They have had no fever for at least 24 hours (that is one full day of no fever without the use medicine that reduces fevers)  AND  - other symptoms have improved (for example, when their cough or shortness of breath have improved)  AND  - If had mild or moderate illness, at least 10 days have passed since their symptoms first appeared or if severe illness (needed oxygen) or immunosuppressed, at least 20 days have passed since symptoms first appeared  Patients with confirmed COVID-19 should also notify close contacts (including their workplace) and ask that they self-quarantine  Currently, close contact is defined as being within 6 feet for 15 minutes or more from the period 24 hours starting 48 hours before symptom onset to the time at which the patient went into isolation  Close contacts of patients diagnosed with COVID-19 should be instructed by the patient to self-quarantine for 14 days from the last time of their last contact with the patient  Source: [employer]     Prednisone (By mouth)   Prednisone (PRED-ni-sone)  Treats many diseases and conditions, especially problems related to inflammation  This medicine is a corticosteroid  Brand Name(s): Jeronimo, predniSONE Intensol   There may be other brand names for this medicine  When This Medicine Should Not Be Used: This medicine is not right for everyone  Do not use if you had an allergic reaction to prednisone or if you are pregnant  How to Use This Medicine:   Liquid, Tablet, Delayed Release Tablet  · Take your medicine as directed  Your dose may need to be changed several times to find what works best for you  · It is best to take this medicine with food or milk  · Swallow the delayed-release tablet whole  Do not crush, break, or chew it  · Measure the oral liquid medicine with a marked measuring spoon, oral syringe, or medicine cup  · Missed dose: Take a dose as soon as you remember  If it is almost time for your next dose, wait until then and take a regular dose  Do not take extra medicine to make up for a missed dose  · Store the medicine in a closed container at room temperature, away from heat, moisture, and direct light  Do not freeze the oral liquid  Drugs and Foods to Avoid:   Ask your doctor or pharmacist before using any other medicine, including over-the-counter medicines, vitamins, and herbal products    · Tell your doctor if you use any of the following:  ? Aminoglutethimide, amphotericin B, carbamazepine, cholestyramine, cyclosporine, digoxin, isoniazid, ketoconazole, phenobarbital, phenytoin, or rifampin  ? Blood thinner, such as warfarin  ? NSAID pain or arthritis medicine, such as aspirin, diclofenac, ibuprofen, naproxen, celecoxib  ? Diuretic (water pill)  ? Diabetes medicine  ? Macrolide antibiotic, such as azithromycin, clarithromycin, erythromycin  ? Estrogen, including birth control pills or hormone replacement therapy  · This medicine may interfere with vaccines  Ask your doctor before you get a flu shot or any other vaccines  Warnings While Using This Medicine:   · It is not safe to take this medicine during pregnancy  It could harm an unborn baby  Tell your doctor right away if you become pregnant  · Tell your doctor if you are breastfeeding or if you have kidney problems, heart failure, high blood pressure, a recent heart attack, diabetes, glaucoma, osteoporosis, or thyroid problems  Tell your doctor about any infection you have  Also tell your doctor if you have had mental or emotional problems (such as depression) or stomach or bowel problems (such as an ulcer or diverticulitis)  · This medicine may cause the following problems:  ? Mood or behavior changes  ? Higher blood pressure, retaining water, changes in salt or potassium levels in your body  ? Cataracts or glaucoma (with long-term use)  ? Weak bones or osteoporosis (with long-term use)  ? Slow growth in children (with long-term use)  ? Muscle problems (with high doses, especially if you have myasthenia gravis or similar nerve and muscle problems)  · Do not stop using this medicine suddenly  Your doctor will need to slowly decrease your dose before you stop it completely  · This medicine could cause you to get infections more easily  Tell your doctor right away if you are exposed to chicken pox, measles, or other serious infection  Tell your doctor if you had a serious infection in the past, such as tuberculosis or herpes    · Tell your doctor about any extra stress or anxiety in your life  Your dose might need to be changed for a short time  · Tell any doctor or dentist who treats you that you are using this medicine  This medicine may affect certain medical test results  · Keep all medicine out of the reach of children  Never share your medicine with anyone  Possible Side Effects While Using This Medicine:   Call your doctor right away if you notice any of these side effects:  · Allergic reaction: Itching or hives, swelling in your face or hands, swelling or tingling in your mouth or throat, chest tightness, trouble breathing  · Dark freckles, skin color changes, coldness, weakness, tiredness, nausea, vomiting, weight loss  · Depression, unusual thoughts, feelings, or behaviors, trouble sleeping  · Fever, chills, cough, sore throat, and body aches  · Muscle pain or weakness  · Rapid weight gain, swelling in your hands, ankles, or feet  · Severe stomach pain, nausea, vomiting, or red or black stools  · Skin changes or growths  · Trouble seeing, eye pain, headache  If you notice these less serious side effects, talk with your doctor:   · Increased appetite  · Round, puffy face  · Weight gain around your neck, upper back, breast, face, or waist  If you notice other side effects that you think are caused by this medicine, tell your doctor  Call your doctor for medical advice about side effects  You may report side effects to FDA at 5-096-FDA-6441  © Copyright [employer] Information is for End User's use only and may not be sold, redistributed or otherwise used for commercial purposes  The above information is an  only  It is not intended as medical advice for individual conditions or treatments  Talk to your doctor, nurse or pharmacist before following any medical regimen to see if it is safe and effective for you

## 2020-12-23 NOTE — H&P
[facility] Internal Medicine  H&P- Cuauhtemoc Dee 1965, 48 y o  female MRN: [id number]  Unit/Bed#: [address] [facility] Encounter: [id number]  Primary Care Provider: Mari Harris DO   Date and time admitted to hospital: 12/22/2020  7:01 PM    * Pneumonia due to COVID-19 virus  Assessment & Plan  Patient started with some SOB and fevers on 12/14, and test resulted positive mitral/15  Her  was also sick at home  Has had fevers off and on since then and continues with a cough and SOB  She states that her SOB is really only with exertion, otherwise has no SOB at rest   - SpO2 93% on room air at rest, but desats to 70s with ambulation and patient becomes very symptomatic at this time with lightheadedness, SOB  - check CBC and CMP daily  - D-dimer elevated at 1 64 continue to trend, CRP 24 9  - continue vitamin D3, vitamin-C, zinc  - dexamethasone 6 mg IV daily x10d  - relative severe to 100 mg IV daily 1 followed by 100 mg IV daily x4 days  - DVT prophylaxis with Lovenox 40 mg q 12          VTE Prophylaxis: Enoxaparin (Lovenox)  / sequential compression device   Code Status:  Level 1  POLST: POLST is not applicable to this patient  Discussion with family:  No    Anticipated Length of Stay:  Patient will be admitted on an Inpatient basis with an anticipated length of stay of  > 2 midnights  Justification for Hospital Stay:  COVID-19 pneumonia     Total Time for Visit, including Counseling / Coordination of Care: 30 minutes  Greater than 50% of this total time spent on direct patient counseling and coordination of care  Chief Complaint:   SOB    History of Present Illness:    Cuauhtemoc Dee is a 48 y o  female who presents with PMH of iron deficiency anemia duodenal ulcer  She presented to the ED today with confirmed COVID when as of 12/15  She has been asymptomatic since 12/14  She has had SOB and fevers, cough reviewed she states she only has SOB with exertion   She states her  is sick at home as well  Her SOB recently got worse and she called her PCP and was referred to an urgent care for a CXR and was found to have low SpO2 at that time  In the ED her SpO2 was 93% on RA but down to 70s with ambulation and patient became very SOB and lightheaded with exertion  Review of Systems:    Review of Systems   Constitutional: Positive for fatigue and fever  Negative for chills  HENT: Negative for congestion, rhinorrhea and sore throat  Eyes: Negative for visual disturbance  Respiratory: Positive for cough and shortness of breath  Negative for wheezing  Cardiovascular: Negative for chest pain, palpitations and leg swelling  Gastrointestinal: Negative for abdominal distention, abdominal pain, diarrhea, nausea and vomiting  Genitourinary: Negative for dysuria, frequency and urgency  Musculoskeletal: Negative for gait problem and myalgias  Skin: Negative for color change and rash  Neurological: Positive for light-headedness  Negative for dizziness, weakness and headaches  Past Medical and Surgical History:     Past Medical History:   Diagnosis Date    Constipation     on occ    Dizziness     inner ear    Headache     History of duodenal ulcer     History of iron deficiency anemia     History of palpitations     History of UTI     with hematuria    Neck pain     Obesity     Refusal of blood transfusions as patient is Adventist     Wears glasses     will wear occ for reading       Past Surgical History:   Procedure Laterality Date    BARTHOLIN GLAND CYST EXCISION       SECTION      [id number]    COLONOSCOPY      - "no polpyps"    CYSTOSCOPY  2015    diagnostic / Managed by Kong Menezes / dawson 7/29/15     DILATION AND CURETTAGE OF UTERUS      HYSTERECTOMY      partial-ovaries remain       Meds/Allergies:    Prior to Admission medications    Medication Sig Start Date End Date Taking?  Authorizing Provider   Acetaminophen 500 MG Take 1,000 mg by mouth every 6 (six) hours as needed for mild pain    Yes Historical Provider, MD   Multiple Vitamin (MULTIVITAMIN) capsule Take 1 capsule by mouth daily Last dose 2/1/20   Yes Historical Provider, MD   azithromycin (ZITHROMAX) 250 mg tablet Take 500mg on day 1, 250mg on days 2-5  Patient not taking: Reported on 12/23/2020 12/22/20 12/27/20  Avelino Brown DO   benzonatate (TESSALON) 200 MG capsule Take 1 capsule (200 mg total) by mouth 3 (three) times a day as needed for cough  Patient not taking: Reported on 12/23/2020 12/22/20   Avelino Brown DO   fluticasone [facility]) 50 mcg/act nasal spray 2 sprays into each nostril as needed for rhinitis 6/12/20   Avelino Brown, DO   Probiotic Product (PROBIOTIC-10 PO) Take by mouth every morning     Historical Provider, MD   butalbital-acetaminophen-caffeine (FIORICET,ESGIC) -40 mg per tablet Take 1 tablet by mouth every 6 (six) hours as needed for headaches or migraine  Patient not taking: Reported on 12/23/2020 11/1/20 62/30/05  Patricia Rae MD   cyclobenzaprine (FLEXERIL) 10 mg tablet Take 1 tablet (10 mg total) by mouth daily at bedtime as needed for muscle spasms  Patient not taking: Reported on 11/1/2020 6/12/20 12/23/20  Avelino Brown DO   meclizine (ANTIVERT) 25 mg tablet Take 1 tablet (25 mg total) by mouth 3 (three) times a day as needed for dizziness  Patient not taking: Reported on 12/23/2020 11/4/20 12/23/20  Avelino Brown, DO     I have reviewed home medications with patient personally  Allergies:    Allergies   Allergen Reactions    Amoxicillin Anaphylaxis     Throat closes and itching    Other Hives     Eggplant       Social History:    Social History     Substance and Sexual Activity   Alcohol Use Never    Alcohol/week: 0 0 standard drinks    Frequency: Never    Drinks per session: Patient refused    Binge frequency: Never    Comment: non drinker/ no alcohol use      Social History     Tobacco Use   Smoking Status Former Smoker    Quit date: 19    Years since quittin 9   Smokeless Tobacco Never Used     Social History     Substance and Sexual Activity   Drug Use No       Family History:    Family History   Problem Relation Age of Onset    Colon cancer Mother     Hypertension Mother     Hypothyroidism Mother     Cancer Mother         colon w/mets    Prostate cancer Father     Cancer Father         prostate    Diabetes Sister         3 sisters    Breast cancer additional onset Cousin     Heart disease Neg Hx     Stroke Neg Hx        Physical Exam:     Vitals:   Blood Pressure: 103/55 (200)  Pulse: 82 (20)  Temperature: 99 4 °F (37 4 °C) (20)  Temp Source: Oral (20)  Respirations: 18 (20)  Weight - Scale: 107 kg (236 lb)(last weight used ) (20)  SpO2: 96 % (20)    Physical Exam  Vitals signs reviewed  Constitutional:       General: She is not in acute distress  Appearance: Normal appearance  She is well-developed  She is not ill-appearing or diaphoretic  HENT:      Head: Normocephalic and atraumatic  Cardiovascular:      Rate and Rhythm: Regular rhythm  Tachycardia present  Heart sounds: Normal heart sounds  No murmur  Pulmonary:      Effort: Pulmonary effort is normal  No respiratory distress  Breath sounds: Normal breath sounds  No wheezing, rhonchi or rales  Abdominal:      General: Bowel sounds are normal  There is no distension  Palpations: Abdomen is soft  Tenderness: There is no abdominal tenderness  There is no guarding  Musculoskeletal:         General: No swelling or tenderness  Skin:     General: Skin is warm and dry  Findings: No erythema or rash  Neurological:      Mental Status: She is alert and oriented to person, place, and time     Psychiatric:         Mood and Affect: Mood normal          Behavior: Behavior normal          Additional Data:     Lab Results: I have personally reviewed pertinent reports  Results from last 7 days   Lab Units 12/22/20  2036   WBC Thousand/uL 7 74   HEMOGLOBIN g/dL 14 4   HEMATOCRIT % 46 3*   PLATELETS Thousands/uL 264   NEUTROS PCT % 67   LYMPHS PCT % 27   MONOS PCT % 6   EOS PCT % 0     Results from last 7 days   Lab Units 12/22/20  2131   SODIUM mmol/L 137   POTASSIUM mmol/L 4 1   CHLORIDE mmol/L 102   CO2 mmol/L 28   BUN mg/dL 14   CREATININE mg/dL 1 20   ANION GAP mmol/L 7   CALCIUM mg/dL 7 9*   ALBUMIN g/dL 3 0*   TOTAL BILIRUBIN mg/dL 0 30   ALK PHOS U/L 70   ALT U/L 38   AST U/L 43   GLUCOSE RANDOM mg/dL 95     Results from last 7 days   Lab Units 12/22/20  2131   INR  0 95             Results from last 7 days   Lab Units 12/23/20  0249   LACTIC ACID mmol/L 0 6       Imaging: I have personally reviewed pertinent reports  No orders to display       EKG, Pathology, and Other Studies Reviewed on Admission:   · EKG:  NSR, 96 BPM    Allscripts / Epic Records Reviewed: Yes     ** Please Note: This note has been constructed using a voice recognition system   **

## 2020-12-23 NOTE — UTILIZATION REVIEW
Initial Clinical Review    Admission: Date/Time/Statement:   Admission Orders (From admission, onward)     Ordered        12/22/20 2254  Inpatient Admission  Once                   Orders Placed This Encounter   Procedures    Inpatient Admission     Standing Status:   Standing     Number of Occurrences:   1     Order Specific Question:   Admitting Physician     Answer:   Jay Espinosa     Order Specific Question:   Level of Care     Answer:   Med Surg [16]     Order Specific Question:   Estimated length of stay     Answer:   More than 2 Midnights     Order Specific Question:   Certification     Answer:   I certify that inpatient services are medically necessary for this patient for a duration of greater than two midnights  See H&P and MD Progress Notes for additional information about the patient's course of treatment  ED Arrival Information     Expected Arrival Acuity Means of Arrival Escorted By Service Admission Type    - 12/22/2020 18:51 Urgent Walk-In Family Member General Medicine Urgent    Arrival Complaint    Shortness of Breath        Chief Complaint   Patient presents with    Shortness of Breath     Patient is confirmed covid  Had CXR done today and was sent to ED  States she is feeling very tired and has been coughing alot for 2 days  No Tylenol today     Assessment/Plan: 48 y o  female who presents with PMH of iron deficiency anemia duodenal ulcer  She presented to the ED today with confirmed COVID when as of 12/15  Symptoms have become worse and states worsening sob with exertion  In ED tachycardic,tachypnea,fever 100 6  Spo2 93% down to 70s with ambulation and became very SOB and lightheaded with exertion  d dimer 1 64, ld 328, crp 24 9  CXR minimal bibasilar subsegmental atelectasis  Pt admitted inpatient COVID 19 pneumonia  Continue IV Dexamethasone, monitor inflammatory markers   Continue oxygen 2L  ED Triage Vitals   Temperature Pulse Respirations Blood Pressure SpO2   12/22/20 1910 12/22/20 1910 12/22/20 1910 12/22/20 1910 12/22/20 1910   (!) 100 6 °F (38 1 °C) (!) 106 20 136/66 95 %      Temp Source Heart Rate Source Patient Position - Orthostatic VS BP Location FiO2 (%)   12/22/20 1910 12/22/20 1910 12/22/20 1910 12/22/20 1910 --   Tympanic Monitor Sitting Left arm       Pain Score       12/23/20 0129       No Pain          Wt Readings from Last 1 Encounters:   12/22/20 107 kg (236 lb)     Additional Vital Signs:   12/23/20 0130  99 4 °F (37 4 °C)  82  18  103/55    96 %      None (Room air)  Lying   12/23/20 0033  98 7 °F (37 1 °C)  81    193/86Abnormal     96 %  28  2 L/min  Nasal cannula  Lying   12/22/20 2345    86  18      97 %  28  2 L/min  Nasal cannula     12/22/20 2313  98 8 °F (37 1 °C)  92  24Abnormal   130/66    97 %  28  2 L/min  Nasal cannula  Lying   12/22/20 2230    92  25Abnormal   139/67  96  95 %             Pertinent Labs/Diagnostic Test Results:   12/23 CXR pa lat Minimal bibasilar subsegmental atelectasis     Results from last 7 days   Lab Units 12/22/20 2036   WBC Thousand/uL 7 74   HEMOGLOBIN g/dL 14 4   HEMATOCRIT % 46 3*   PLATELETS Thousands/uL 264   NEUTROS ABS Thousands/µL 5 12     Results from last 7 days   Lab Units 12/22/20  2131   SODIUM mmol/L 137   POTASSIUM mmol/L 4 1   CHLORIDE mmol/L 102   CO2 mmol/L 28   ANION GAP mmol/L 7   BUN mg/dL 14   CREATININE mg/dL 1 20   EGFR ml/min/1 73sq m 59   CALCIUM mg/dL 7 9*     Results from last 7 days   Lab Units 12/22/20  2131   AST U/L 43   ALT U/L 38   ALK PHOS U/L 70   TOTAL PROTEIN g/dL 6 9   ALBUMIN g/dL 3 0*   TOTAL BILIRUBIN mg/dL 0 30     Results from last 7 days   Lab Units 12/22/20 2131   GLUCOSE RANDOM mg/dL 95     Results from last 7 days   Lab Units 12/22/20 2036   TROPONIN I ng/mL <0 02     Results from last 7 days   Lab Units 12/22/20  2131   D-DIMER QUANTITATIVE ug/ml FEU 1 64*     Results from last 7 days   Lab Units 12/22/20  2131   PROTIME seconds 12 6   INR  0 95   PTT seconds 31 Results from last 7 days   Lab Units 12/22/20 2036   PROCALCITONIN ng/ml <0 05     Results from last 7 days   Lab Units 12/23/20  0249   LACTIC ACID mmol/L 0 6     Results from last 7 days   Lab Units 12/22/20  2131   NT-PRO BNP pg/mL 15     Results from last 7 days   Lab Units 12/22/20  2036   FERRITIN ng/mL 179     Results from last 7 days   Lab Units 12/22/20  2131   CRP mg/L 24 9*     Results from last 7 days   Lab Units 12/22/20  2321   CLARITY UA  Slightly Cloudy   COLOR UA  Light Yellow   SPEC GRAV UA  1 020   PH UA  6 0   GLUCOSE UA mg/dl Negative   KETONES UA mg/dl 15 (1+)*   BLOOD UA  Small*   PROTEIN UA mg/dl 100 (2+)*   NITRITE UA  Negative   BILIRUBIN UA  Negative   UROBILINOGEN UA E U /dl 0 2   LEUKOCYTES UA  Negative   WBC UA /hpf 0-1   RBC UA /hpf 1-2   BACTERIA UA /hpf Moderate*   EPITHELIAL CELLS WET PREP /hpf Innumerable*     Results from last 7 days   Lab Units 12/22/20 2036   BLOOD CULTURE  Received in Microbiology Lab  Culture in Progress  Received in Microbiology Lab  Culture in Progress       ED Treatment:   Medication Administration from 12/22/2020 1850 to 12/23/2020 0103       Date/Time Order Dose Route Action Action by Comments     12/22/2020 2041 dexamethasone (DECADRON) injection 10 mg 10 mg Intravenous Given Darvin Buerger, RN      12/22/2020 2214 remdesivir (Veklury) 200 mg in sodium chloride 0 9 % 250 mL IVPB 0 mg Intravenous Stopped Dulec Casey RN      12/22/2020 2129 remdesivir (Veklury) 200 mg in sodium chloride 0 9 % 250 mL IVPB 200 mg Intravenous [address] Darvin Buerger, RN      12/22/2020 2316 enoxaparin (LOVENOX) subcutaneous injection 40 mg 40 mg Subcutaneous Given Laura Meadows RN         Past Medical History:   Diagnosis Date    Constipation     on occ    Dizziness     inner ear    Headache     History of duodenal ulcer     History of iron deficiency anemia     History of palpitations     History of UTI     with hematuria    Neck pain     Obesity     Refusal of blood transfusions as patient is Judaism     Wears glasses     will wear occ for reading     Present on Admission:   Pneumonia due to COVID-19 virus      Admitting Diagnosis: SOB (shortness of breath) [R06 02]  Hypoxia [R09 02]  COVID-19 virus infection [U07 1]  Age/Sex: 48 y o  female  Admission Orders:  Contact Airborne Isolation  gmf  oxygen nc 2l  Proclacitonin Reflex  Bmp cbc diff  Ferritin  c reactive  Protein  D dimer quantitative  Blood cx  Interleukin -6 serum  Scheduled Medications:  ascorbic acid, 1,000 mg, Oral, Q12H [address]  cholecalciferol, 2,000 Units, Oral, Daily  dexamethasone, 6 mg, Intravenous, Q24H  enoxaparin, 40 mg, Subcutaneous, Q12H [address]  zinc sulfate, 220 mg, Oral, Daily    Followed by  Andrew Vaz ON 12/30/2020] multivitamin-minerals, 1 tablet, Oral, Daily  remdesivir, 100 mg, Intravenous, Q24H      Continuous IV Infusions:  sodium chloride, 75 mL/hr, Intravenous, Continuous      PRN Meds:  acetaminophen, 650 mg, Oral, Q6H PRN  benzonatate, 200 mg, Oral, TID PRN        None    Network Utilization Review Department  ATTENTION: Please call with any questions or concerns to [phone] and carefully listen to the prompts so that you are directed to the right person  All voicemails are confidential   Brian Juárez all requests for admission clinical reviews, approved or denied determinations and any other requests to dedicated fax number below belonging to the campus where the patient is receiving treatment   List of dedicated fax numbers for the Facilities:  FACILITY NAME UR FAX NUMBER   ADMISSION DENIALS (Administrative/Medical Necessity) [phone]   [address]  (Maternity/NICU/Pediatrics) 261 [facility],[address] Jania Carbajal [address] (William Tesfaye "[address]) [address] [place] [address] Alla T[address] [place] [address] 951 [phone]

## 2020-12-23 NOTE — ASSESSMENT & PLAN NOTE
Patient started with some SOB and fevers on 12/14, and test resulted positive mitral/15  Her  was also sick at home  Has had fevers off and on since then and continues with a cough and SOB  She states that her SOB is really only with exertion, otherwise has no SOB at rest   - SpO2 93% on room air at rest, but desats to 70s with ambulation and patient becomes very symptomatic at this time with lightheadedness, SOB  - D-dimer elevated at 1 64 continue to trend, CRP 24 9  D-dimer improved to 1 14 and CRP down to 22 7  - continue vitamin D3, vitamin-C, zinc  Patient received Decadron while hospitalized  Patient was also given couple of doses of remdesivir during the hospital stay  Patient received DVT prophylaxis with Lovenox 40 milligrams subcutaneously b i d    Patient is feeling much better and O2 saturation continued to remain in mid 90s off oxygen and low 90s on ambulation without any symptoms  Patient to be discharged on prednisone taper-discussed in detail with patient to monitor O2 saturation and return to hospital if patient gets hypoxic

## 2020-12-23 NOTE — PHYSICAL THERAPY NOTE
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)   General   Additional Pertinent History Pt admitted with SOB with diagnosis of COVID-19   Cognition   Overall Cognitive Status WFL   RLE Assessment   RLE Assessment WNL   LLE Assessment   LLE Assessment WNL   Bed Mobility   Supine to Sit 7  Independent   Sit to Supine 7  Independent   Transfers   Sit to Stand 7  Independent   Stand to Sit 7  Independent   Stand pivot 7  Independent   Ambulation/Elevation   Gait pattern WNL   Gait Assistance 7  Independent   Distance 50 feet Sp02 98% on 2L02   Balance   Static Sitting Good   Dynamic Sitting Good   Static Standing Good   Dynamic Standing Good   Ambulatory Good   Assessment   Prognosis Good   Problem List   (none)   Assessment Patient seen for Physical Therapy evaluation  Patient admitted with Pneumonia due to COVID-19 virus  Comorbidities affecting patient's physical performance include: obesity  Personal factors affecting patient at time of initial evaluation include: lives in [address] [place] [address]  Prior to admission, patient was independent with functional mobility without assistive device and independent with ADLS  Please find objective findings from Physical Therapy assessment regarding body systems outlined above with impairments and limitations including none  The Barthel Index was used as a functional outcome tool presenting with a score of 100 today indicating no limitations of functional mobility and ADLS    Patient's clinical presentation is currently stable  as seen in patient's presentation of baseline  As demonstrated by objective findings, the assigned level of complexity for this evaluation is low     Goals   Patient Goals "go home "   Plan   Treatment/Interventions   (recommend pt ambulate ad dana)   Recommendation   PT Discharge Recommendation Return to previous environment with no needs   Barthel Index   Feeding 10   Bathing 5   Grooming Score 5   Dressing Score 10   Bladder Score 10   Bowels Score 10   Toilet Use Score 10   Transfers (Bed/Chair) Score 15   Mobility (Level Surface) Score 15   Stairs Score 10   Barthel Index Score 025   Licensure   [place] License Number  Annaa Mono PT [id number]

## 2020-12-24 ENCOUNTER — TELEMEDICINE (OUTPATIENT)
Dept: FAMILY MEDICINE CLINIC | Facility: CLINIC | Age: 55
End: 2020-12-24
Payer: COMMERCIAL

## 2020-12-24 ENCOUNTER — TRANSITIONAL CARE MANAGEMENT (OUTPATIENT)
Dept: FAMILY MEDICINE CLINIC | Facility: CLINIC | Age: 55
End: 2020-12-24

## 2020-12-24 DIAGNOSIS — J98.11 BILATERAL ATELECTASIS: ICD-10-CM

## 2020-12-24 DIAGNOSIS — U07.1 LAB TEST POSITIVE FOR DETECTION OF COVID-19 VIRUS: Primary | ICD-10-CM

## 2020-12-24 DIAGNOSIS — R06.00 DOE (DYSPNEA ON EXERTION): ICD-10-CM

## 2020-12-24 PROBLEM — R42 DIZZINESS: Status: RESOLVED | Noted: 2020-11-04 | Resolved: 2020-12-24

## 2020-12-24 PROBLEM — J01.30 ACUTE NON-RECURRENT SPHENOIDAL SINUSITIS: Status: RESOLVED | Noted: 2020-11-04 | Resolved: 2020-12-24

## 2020-12-24 PROBLEM — R06.09 DOE (DYSPNEA ON EXERTION): Status: ACTIVE | Noted: 2020-12-24

## 2020-12-24 LAB
ATRIAL RATE: 96 BPM
P AXIS: 67 DEGREES
PR INTERVAL: 158 MS
QRS AXIS: 44 DEGREES
QRSD INTERVAL: 74 MS
QT INTERVAL: 324 MS
QTC INTERVAL: 409 MS
T WAVE AXIS: 31 DEGREES
VENTRICULAR RATE: 96 BPM

## 2020-12-24 PROCEDURE — 99496 TRANSJ CARE MGMT HIGH F2F 7D: CPT | Performed by: FAMILY MEDICINE

## 2020-12-24 PROCEDURE — 1111F DSCHRG MED/CURRENT MED MERGE: CPT | Performed by: FAMILY MEDICINE

## 2020-12-24 PROCEDURE — 93010 ELECTROCARDIOGRAM REPORT: CPT | Performed by: INTERNAL MEDICINE

## 2020-12-24 RX ORDER — MULTIVITAMIN
1 CAPSULE ORAL DAILY
COMMUNITY

## 2020-12-24 NOTE — NURSING NOTE
Discharge instructions were given to patient, no further questions at this time  Patient left [address] [place] [address] stable with all belongings at side

## 2020-12-24 NOTE — PLAN OF CARE
Problem: Potential for Falls  Goal: Patient will remain free of falls  Description: INTERVENTIONS:  - Assess patient frequently for physical needs  -  Identify cognitive and physical deficits and behaviors that affect risk of falls    -  [place] fall precautions as indicated by assessment   - Educate patient/family on patient safety including physical limitations  - Instruct patient to call for assistance with activity based on assessment  - Modify environment to reduce risk of injury  - Consider OT/PT consult to assist with strengthening/mobility  12/23/2020 1928 by Faisal Ha RN  Outcome: Completed  12/23/2020 1116 by Faisal aH RN  Outcome: Progressing     Problem: PAIN - ADULT  Goal: Verbalizes/displays adequate comfort level or baseline comfort level  Description: Interventions:  - Encourage patient to monitor pain and request assistance  - Assess pain using appropriate pain scale  - Administer analgesics based on type and severity of pain and evaluate response  - Implement non-pharmacological measures as appropriate and evaluate response  - Consider cultural and social influences on pain and pain management  - Notify physician/advanced practitioner if interventions unsuccessful or patient reports new pain  12/23/2020 1928 by Faisal Ha RN  Outcome: Completed  12/23/2020 1116 by Faisal Ha RN  Outcome: Progressing     Problem: INFECTION - ADULT  Goal: Absence or prevention of progression during hospitalization  Description: INTERVENTIONS:  - Assess and monitor for signs and symptoms of infection  - Monitor lab/diagnostic results  - Monitor all insertion sites, i e  indwelling lines, tubes, and drains  - [place] appropriate cooling/warming therapies per order  - Administer medications as ordered  - Instruct and encourage patient and family to use good hand hygiene technique  - Identify and instruct in appropriate isolation precautions for identified infection/condition  12/23/2020 1928 by Ashley Cheema RN  Outcome: Completed  12/23/2020 1116 by Ashley Cheema RN  Outcome: Progressing     Problem: SAFETY ADULT  Goal: Patient will remain free of falls  Description: INTERVENTIONS:  - Assess patient frequently for physical needs  -  Identify cognitive and physical deficits and behaviors that affect risk of falls    -  [place] fall precautions as indicated by assessment   - Educate patient/family on patient safety including physical limitations  - Instruct patient to call for assistance with activity based on assessment  - Modify environment to reduce risk of injury  - Consider OT/PT consult to assist with strengthening/mobility  12/23/2020 1928 by Ashley Cheema RN  Outcome: Completed  12/23/2020 1116 by Ashley Cheema RN  Outcome: Progressing  Goal: Maintain or return to baseline ADL function  Description: INTERVENTIONS:  -  Assess patient's ability to carry out ADLs; assess patient's baseline for ADL function and identify physical deficits which impact ability to perform ADLs (bathing, care of mouth/teeth, toileting, grooming, dressing, etc )  - Assess/evaluate cause of self-care deficits   - Assess range of motion  - Assess patient's mobility; develop plan if impaired  - Assess patient's need for assistive devices and provide as appropriate  - Encourage maximum independence but intervene and supervise when necessary  - Involve family in performance of ADLs  - Assess for home care needs following discharge   - Consider OT consult to assist with ADL evaluation and planning for discharge  - Provide patient education as appropriate  12/23/2020 1928 by Ashley Cheema RN  Outcome: Completed  12/23/2020 1116 by Ashley Cheema RN  Outcome: Progressing  Goal: Maintain or return mobility status to optimal level  Description: INTERVENTIONS:  - Assess patient's baseline mobility status (ambulation, transfers, stairs, etc )    - Identify cognitive and physical deficits and behaviors that affect mobility  - Identify mobility aids required to assist with transfers and/or ambulation (gait belt, sit-to-stand, lift, walker, cane, etc )  - [place] fall precautions as indicated by assessment  - Record patient progress and toleration of activity level on Mobility SBAR; progress patient to next Phase/Stage  - Instruct patient to call for assistance with activity based on assessment  - Consider rehabilitation consult to assist with strengthening/weightbearing, etc   12/23/2020 1928 by Ren Sylvester RN  Outcome: Completed  12/23/2020 1116 by Ren Sylvester RN  Outcome: Progressing     Problem: DISCHARGE PLANNING  Goal: Discharge to home or other facility with appropriate resources  Description: INTERVENTIONS:  - Identify barriers to discharge w/patient and caregiver  - Arrange for needed discharge resources and transportation as appropriate  - Identify discharge learning needs (meds, wound care, etc )  - Arrange for interpretive services to assist at discharge as needed  - Refer to Case Management Department for coordinating discharge planning if the patient needs post-hospital services based on physician/advanced practitioner order or complex needs related to functional status, cognitive ability, or social support system  12/23/2020 1928 by Ren Sylvester RN  Outcome: Completed  12/23/2020 1116 by Ren Sylvester RN  Outcome: Progressing     Problem: Knowledge Deficit  Goal: Patient/family/caregiver demonstrates understanding of disease process, treatment plan, medications, and discharge instructions  Description: Complete learning assessment and assess knowledge base    Interventions:  - Provide teaching at level of understanding  - Provide teaching via preferred learning methods  12/23/2020 1928 by Ren Sylvester RN  Outcome: Completed  12/23/2020 1116 by Ren Sylvester RN  Outcome: Progressing     Problem: RESPIRATORY - ADULT  Goal: Achieves optimal ventilation and oxygenation  Description: INTERVENTIONS:  - Assess for changes in respiratory status  - Assess for changes in mentation and behavior  - Position to facilitate oxygenation and minimize respiratory effort  - Oxygen administered by appropriate delivery if ordered  - Initiate smoking cessation education as indicated  - Encourage broncho-pulmonary hygiene including cough, deep breathe, Incentive Spirometry  - Assess the need for suctioning and aspirate as needed  - Assess and instruct to report SOB or any respiratory difficulty  - Respiratory Therapy support as indicated  12/23/2020 1928 by Magdalena Ledbetter RN  Outcome: Completed  12/23/2020 1116 by Magdalena Ledbetter RN  Outcome: Progressing

## 2020-12-24 NOTE — UTILIZATION REVIEW
Notification of Discharge  This is a Notification of Discharge from our facility [address]  Please be advised that this patient has been discharge from our facility  Below you will find the admission and discharge date and time including the patients disposition  PRESENTATION DATE: 12/22/2020  7:01 PM  OBS ADMISSION DATE:   IP ADMISSION DATE: 12/22/20 2254   DISCHARGE DATE: 12/23/2020  7:30 PM  DISPOSITION: Home/Self Care Home/Self Care   Admission Orders listed below:  Admission Orders (From admission, onward)     Ordered        12/22/20 2254  Inpatient Admission  Once                   Please contact the UR Department if additional information is required to close this patient's authorization/case  Theresa Irizarry  Network Utilization Review Department  Main: [phone] x carefully listen to the prompts  All voicemails are confidential   [email or web address]  Send all requests for admission clinical reviews, approved or denied determinations and any other requests to dedicated fax number below belonging to the campus where the patient is receiving treatment   List of dedicated fax numbers:  [address] DENIALS (Administrative/Medical Necessity) [phone]   [address] (Maternity/NICU/Pediatrics) [phone]   [facility] [phone]   Tawny Caller [phone]   Leeanne Sim [phone]   Lydia Long [facility] 1525 [facility] [phone]   1101 [facility] [phone] 2205 [facility]  2401 [facility] 1000 [address] [facility] [phone]

## 2020-12-27 LAB — IL6 SERPL-MCNC: 26.7 PG/ML (ref 0–13)

## 2020-12-28 LAB
BACTERIA BLD CULT: NORMAL

## 2021-01-08 ENCOUNTER — OFFICE VISIT (OUTPATIENT)
Dept: FAMILY MEDICINE CLINIC | Facility: CLINIC | Age: 56
End: 2021-01-08
Payer: COMMERCIAL

## 2021-01-08 ENCOUNTER — TELEPHONE (OUTPATIENT)
Dept: FAMILY MEDICINE CLINIC | Facility: CLINIC | Age: 56
End: 2021-01-08

## 2021-01-08 VITALS
WEIGHT: 225 LBS | SYSTOLIC BLOOD PRESSURE: 114 MMHG | HEART RATE: 84 BPM | DIASTOLIC BLOOD PRESSURE: 72 MMHG | TEMPERATURE: 97.6 F | BODY MASS INDEX: 41.41 KG/M2 | RESPIRATION RATE: 16 BRPM | HEIGHT: 62 IN

## 2021-01-08 DIAGNOSIS — E66.01 MORBID OBESITY (HCC): ICD-10-CM

## 2021-01-08 DIAGNOSIS — Z13.89 SCREENING FOR CARDIOVASCULAR, RESPIRATORY, AND GENITOURINARY DISEASES: ICD-10-CM

## 2021-01-08 DIAGNOSIS — Z20.2 CHLAMYDIA CONTACT: ICD-10-CM

## 2021-01-08 DIAGNOSIS — Z13.83 SCREENING FOR CARDIOVASCULAR, RESPIRATORY, AND GENITOURINARY DISEASES: ICD-10-CM

## 2021-01-08 DIAGNOSIS — Z00.00 HEALTHCARE MAINTENANCE: Primary | ICD-10-CM

## 2021-01-08 DIAGNOSIS — Z13.1 SCREENING FOR DIABETES MELLITUS (DM): ICD-10-CM

## 2021-01-08 DIAGNOSIS — U07.1 LAB TEST POSITIVE FOR DETECTION OF COVID-19 VIRUS: ICD-10-CM

## 2021-01-08 DIAGNOSIS — Z13.6 SCREENING FOR CARDIOVASCULAR, RESPIRATORY, AND GENITOURINARY DISEASES: ICD-10-CM

## 2021-01-08 PROBLEM — D50.9 IRON DEFICIENCY ANEMIA: Status: RESOLVED | Noted: 2020-12-22 | Resolved: 2021-01-08

## 2021-01-08 PROBLEM — J12.82 PNEUMONIA DUE TO COVID-19 VIRUS: Status: RESOLVED | Noted: 2020-12-22 | Resolved: 2021-01-08

## 2021-01-08 PROBLEM — R05.9 COUGH: Status: RESOLVED | Noted: 2020-12-22 | Resolved: 2021-01-08

## 2021-01-08 PROBLEM — R50.9 FEVER: Status: RESOLVED | Noted: 2020-12-15 | Resolved: 2021-01-08

## 2021-01-08 PROCEDURE — 1036F TOBACCO NON-USER: CPT | Performed by: FAMILY MEDICINE

## 2021-01-08 PROCEDURE — 3008F BODY MASS INDEX DOCD: CPT | Performed by: FAMILY MEDICINE

## 2021-01-08 PROCEDURE — 3725F SCREEN DEPRESSION PERFORMED: CPT | Performed by: FAMILY MEDICINE

## 2021-01-08 PROCEDURE — 99396 PREV VISIT EST AGE 40-64: CPT | Performed by: FAMILY MEDICINE

## 2021-01-08 RX ORDER — OMEGA-3S/DHA/EPA/FISH OIL/D3 300MG-1000
400 CAPSULE ORAL DAILY
COMMUNITY
End: 2021-03-23 | Stop reason: ALTCHOICE

## 2021-01-08 RX ORDER — RIBOFLAVIN (VITAMIN B2) 100 MG
100 TABLET ORAL DAILY
COMMUNITY
End: 2021-03-23 | Stop reason: ALTCHOICE

## 2021-01-08 RX ORDER — AZITHROMYCIN 500 MG/1
1000 TABLET, FILM COATED ORAL ONCE
Qty: 2 TABLET | Refills: 0 | Status: SHIPPED | OUTPATIENT
Start: 2021-01-08 | End: 2021-01-08

## 2021-01-08 RX ORDER — B-COMPLEX WITH VITAMIN C
TABLET ORAL
COMMUNITY
End: 2021-03-23 | Stop reason: ALTCHOICE

## 2021-01-08 NOTE — PROGRESS NOTES
Assessment/Plan:    No problem-specific Assessment & Plan notes found for this encounter  cpe  Continue wt loss efforts   tested positive for chlamydia, tx offered, advised possible implications  Morbid obesity aware  Offer formal hearing evaluation referral when ready     Diagnoses and all orders for this visit:    Healthcare maintenance    Lab test positive for detection of COVID-19 virus    Morbid obesity ([facility] [address] )    Screening for cardiovascular, respiratory, and genitourinary diseases  -     Lipid Panel with Direct LDL reflex; Future    Screening for diabetes mellitus (DM)  -     Comprehensive metabolic panel; Future    Chlamydia contact  -     azithromycin (ZITHROMAX) 500 MG tablet; Take 2 tablets (1,000 mg total) by mouth once for 1 dose    BMI 40 0-44 9, adult ([facility])    Other orders  -     Ascorbic Acid (vitamin C) 100 MG tablet; Take 100 mg by mouth daily  -     Zinc 100 MG TABS; Take by mouth  -     cholecalciferol (VITAMIN D3) 400 units tablet; Take 400 Units by mouth daily  -     Cancel: Comprehensive metabolic panel; Future  -     Cancel: Lipid Panel with Direct LDL reflex; Future        Return if symptoms worsen or fail to improve  Subjective:      Patient ID: Quincy Beckman is a 48 y o  female  Chief Complaint   Patient presents with    Physical Exam     [facility]       HPI  Here for cpe  smbg at times, normal <100  Lost 12#  VR exercise program  Daily  Likes it a lot    Diet is improved  More veggies  Egg whites  Limits carbs    The following portions of the patient's history were reviewed and updated as appropriate: allergies, current medications, past family history, past medical history, past social history, past surgical history and problem list     Review of Systems   Respiratory: Negative for shortness of breath  Cardiovascular: Negative for chest pain           Current Outpatient Medications   Medication Sig Dispense Refill    Ascorbic Acid (vitamin C) 100 MG tablet Take 100 mg by mouth daily      cholecalciferol (VITAMIN D3) 400 units tablet Take 400 Units by mouth daily      Multiple Vitamin (multivitamin) capsule Take 1 capsule by mouth daily Taking liquid      Zinc 100 MG TABS Take by mouth      azithromycin (ZITHROMAX) 500 MG tablet Take 2 tablets (1,000 mg total) by mouth once for 1 dose 2 tablet 0     No current facility-administered medications for this visit  Objective:    /72   Pulse 84   Temp 97 6 °F (36 4 °C)   Resp 16   Ht 5' 2" (1 575 m)   Wt 102 kg (225 lb)   BMI 41 15 kg/m²        Physical Exam  Vitals signs and nursing note reviewed  Constitutional:       Appearance: Normal appearance  She is well-developed  She is obese  She is not ill-appearing  HENT:      Head: Normocephalic  Right Ear: Tympanic membrane normal       Left Ear: Tympanic membrane normal    Eyes:      General: No scleral icterus  Conjunctiva/sclera: Conjunctivae normal    Neck:      Musculoskeletal: Neck supple  Cardiovascular:      Rate and Rhythm: Normal rate and regular rhythm  Heart sounds: No murmur  Pulmonary:      Effort: Pulmonary effort is normal  No respiratory distress  Breath sounds: No wheezing  Abdominal:      General: There is no distension  Palpations: Abdomen is soft  Tenderness: There is no abdominal tenderness  Musculoskeletal:         General: No deformity  Skin:     General: Skin is warm and dry  Coloration: Skin is not pale  Neurological:      Mental Status: She is alert  Motor: No weakness  Gait: Gait normal    Psychiatric:         Mood and Affect: Mood normal          Behavior: Behavior normal          Thought Content: Thought content normal          BMI Counseling: Body mass index is 41 15 kg/m²  The BMI is above normal  Nutrition recommendations include decreasing portion sizes and moderation in carbohydrate intake  Exercise recommendations include exercising 3-5 times per week   No pharmacotherapy was ordered                Mari Harris, DO

## 2021-01-08 NOTE — TELEPHONE ENCOUNTER
Pt reports  tested positive for chlamydia  She is not concerned per pt nor is she interested in any other testing  zithromax sent

## 2021-03-23 ENCOUNTER — OFFICE VISIT (OUTPATIENT)
Dept: FAMILY MEDICINE CLINIC | Facility: CLINIC | Age: 56
End: 2021-03-23
Payer: COMMERCIAL

## 2021-03-23 VITALS
TEMPERATURE: 98 F | HEART RATE: 72 BPM | DIASTOLIC BLOOD PRESSURE: 84 MMHG | WEIGHT: 229 LBS | BODY MASS INDEX: 42.14 KG/M2 | RESPIRATION RATE: 16 BRPM | HEIGHT: 62 IN | SYSTOLIC BLOOD PRESSURE: 124 MMHG

## 2021-03-23 DIAGNOSIS — N30.00 ACUTE CYSTITIS WITHOUT HEMATURIA: Primary | ICD-10-CM

## 2021-03-23 LAB
SL AMB  POCT GLUCOSE, UA: ABNORMAL
SL AMB LEUKOCYTE ESTERASE,UA: ABNORMAL
SL AMB POCT BILIRUBIN,UA: ABNORMAL
SL AMB POCT BLOOD,UA: ABNORMAL
SL AMB POCT CLARITY,UA: CLEAR
SL AMB POCT COLOR,UA: YELLOW
SL AMB POCT KETONES,UA: ABNORMAL
SL AMB POCT NITRITE,UA: ABNORMAL
SL AMB POCT PH,UA: 7
SL AMB POCT SPECIFIC GRAVITY,UA: 1.02
SL AMB POCT URINE PROTEIN: ABNORMAL
SL AMB POCT UROBILINOGEN: 0.2

## 2021-03-23 PROCEDURE — 3008F BODY MASS INDEX DOCD: CPT | Performed by: NURSE PRACTITIONER

## 2021-03-23 PROCEDURE — 81003 URINALYSIS AUTO W/O SCOPE: CPT | Performed by: NURSE PRACTITIONER

## 2021-03-23 PROCEDURE — 1036F TOBACCO NON-USER: CPT | Performed by: NURSE PRACTITIONER

## 2021-03-23 PROCEDURE — 99213 OFFICE O/P EST LOW 20 MIN: CPT | Performed by: NURSE PRACTITIONER

## 2021-03-23 RX ORDER — SULFAMETHOXAZOLE AND TRIMETHOPRIM 800; 160 MG/1; MG/1
1 TABLET ORAL EVERY 12 HOURS SCHEDULED
Qty: 14 TABLET | Refills: 0 | Status: SHIPPED | OUTPATIENT
Start: 2021-03-23 | End: 2021-03-30

## 2021-03-23 NOTE — PROGRESS NOTES
Assessment/Plan:  Urine dip results discussed with patient  I explained to the patient that this means she might have or does not have UTI and will confirm with urine culture  Explained about all possible complications of possible UTI like sepsis  Shared decision making to start empirical therapy with antibiotics for possible UTI and will call with urine culture results  If urine culture results will be negative, patient will stop antibiotic  1  Acute cystitis without hematuria  -     POCT urine dip auto non-scope  -     Urine culture  -     sulfamethoxazole-trimethoprim (BACTRIM DS) 800-160 mg per tablet; Take 1 tablet by mouth every 12 (twelve) hours for 7 days              Patient Instructions: Take medication with food  It is important that you take the entire course of antibiotics prescribed  May also take a probiotic of your choice to maintain healthy GI erwin  Can take some probiotic and yogurt with the medication  Supportive care discussed and advised  Advised to RTO for any worsening and no improvement  Follow up for no improvement and worsening of conditions  Patient advised and educated when to see immediate medical care  Return if symptoms worsen or fail to improve  No future appointments  Subjective:      Patient ID: Tino Membreno is a 48 y o  female  Chief Complaint   Patient presents with    Difficulty Urinating     C/O some urinary incontinence since yesterday with visible hematuria  JMoyle LPN         Vitals:  /84   Pulse 72   Temp 98 °F (36 7 °C)   Resp 16   Ht 5' 2" (1 575 m)   Wt 104 kg (229 lb)   BMI 41 88 kg/m²     HPI  Patient stated that noticed some  Urine leakage yesterday and discomfort with urination  Also noticed once that her urine was pink tinged  Denies fever, chills, abdominal pain, nausea and vomiting  Not taking any OTC  Prone to get UTIs        The following portions of the patient's history were reviewed and updated as appropriate: allergies, current medications, past family history, past medical history, past social history, past surgical history and problem list       Review of Systems   Constitutional: Negative for chills, diaphoresis, fatigue, fever and unexpected weight change  Respiratory: Negative  Cardiovascular: Negative  Gastrointestinal: Negative for abdominal pain, nausea and vomiting  Genitourinary: Positive for dysuria  Negative for decreased urine volume, difficulty urinating, flank pain, frequency, genital sores, hematuria and urgency  As noted in HPI     Musculoskeletal: Negative  Skin: Negative  Neurological: Negative for dizziness and headaches  Objective:    Social History     Tobacco Use   Smoking Status Former Smoker    Quit date:     Years since quittin 2   Smokeless Tobacco Never Used       Allergies: Allergies   Allergen Reactions    Amoxicillin Anaphylaxis     Throat closes and itching    Other Hives     Eggplant         Current Outpatient Medications   Medication Sig Dispense Refill    Multiple Vitamin (multivitamin) capsule Take 1 capsule by mouth daily Taking liquid      Probiotic Product (PROBIOTIC ADVANCED PO) Take by mouth daily      sulfamethoxazole-trimethoprim (BACTRIM DS) 800-160 mg per tablet Take 1 tablet by mouth every 12 (twelve) hours for 7 days 14 tablet 0     No current facility-administered medications for this visit  Physical Exam  Vitals signs reviewed  Constitutional:       Appearance: She is well-developed  Cardiovascular:      Rate and Rhythm: Normal rate and regular rhythm  Heart sounds: Normal heart sounds  Pulmonary:      Effort: Pulmonary effort is normal       Breath sounds: Normal breath sounds  Abdominal:      General: Bowel sounds are normal       Palpations: Abdomen is soft  Tenderness: There is no abdominal tenderness  Skin:     General: Skin is warm and dry     Neurological:      Mental Status: She is alert and oriented to person, place, and time  Psychiatric:         Behavior: Behavior normal          Thought Content:  Thought content normal          Judgment: Judgment normal              Recent Results (from the past 24 hour(s))   POCT urine dip auto non-scope    Collection Time: 03/23/21 10:17 AM   Result Value Ref Range     COLOR,UA yellow     CLARITY,UA clear     SPECIFIC GRAVITY,UA 1 020      PH,UA 7 0     LEUKOCYTE ESTERASE,UA neg     NITRITE,UA neg     GLUCOSE, UA neg     KETONES,UA neg     BILIRUBIN,UA neg     BLOOD,UA MOD     POCT URINE PROTEIN neg     SL AMB POCT UROBILINOGEN 0 2              EDGAR Salamanca

## 2021-03-23 NOTE — PATIENT INSTRUCTIONS
Take medication with food  It is important that you take the entire course of antibiotics prescribed  May also take a probiotic of your choice to maintain healthy GI erwin  Can take some probiotic and yogurt with the medication  Supportive care discussed and advised  Advised to RTO for any worsening and no improvement  Follow up for no improvement and worsening of conditions  Patient advised and educated when to see immediate medical care

## 2021-03-24 LAB
BACTERIA UR CULT: NORMAL
Lab: NO GROWTH

## 2021-03-25 DIAGNOSIS — N30.00 ACUTE CYSTITIS WITHOUT HEMATURIA: Primary | ICD-10-CM

## 2021-07-21 ENCOUNTER — OFFICE VISIT (OUTPATIENT)
Dept: FAMILY MEDICINE CLINIC | Facility: CLINIC | Age: 56
End: 2021-07-21
Payer: COMMERCIAL

## 2021-07-21 VITALS
WEIGHT: 229 LBS | HEART RATE: 77 BPM | BODY MASS INDEX: 42.14 KG/M2 | SYSTOLIC BLOOD PRESSURE: 150 MMHG | DIASTOLIC BLOOD PRESSURE: 96 MMHG | OXYGEN SATURATION: 96 % | RESPIRATION RATE: 18 BRPM | TEMPERATURE: 95.8 F | HEIGHT: 62 IN

## 2021-07-21 DIAGNOSIS — I83.893 VARICOSE VEINS OF LEG WITH SWELLING, BILATERAL: Primary | ICD-10-CM

## 2021-07-21 DIAGNOSIS — R03.0 ELEVATED BLOOD PRESSURE READING: ICD-10-CM

## 2021-07-21 PROCEDURE — 99213 OFFICE O/P EST LOW 20 MIN: CPT | Performed by: FAMILY MEDICINE

## 2021-07-21 NOTE — PROGRESS NOTES
Assessment/Plan:       Diagnoses and all orders for this visit:    Varicose veins of leg with swelling, bilateral  -     VAS lower limb venous duplex study, complete bilateral; Future    Elevated blood pressure reading  Comments:  discussed BP reading with pt  She has elevated readings on two separate checks  States she does check her BP at home as well and it is sometimes elevated  I discussed starting a blood pressure medication, but she declined at this time  Would like to try diet/exercise/lifestyle modifications for the next 2 weeks  F/u in 2 weeks for BP check  Subjective:      Patient ID: Anusha Lezama is a 48 y o  female  HPI  Benjamin Castano presents today for evaluation of bilateral lower extremity pain and swelling  States that she feels like her calves are tight  This started 2-3 weeks ago  She has noticed varicose veins which are painful  This is new for her  She states she is sedentary a lot due to her work and lack of activities  She is afraid of blood clots  Denies family history of blood clots, recent travel/surgery/immobilization, chest pain, tachycardia, SOB, HA, dizziness, weakness  The following portions of the patient's history were reviewed and updated as appropriate: allergies, current medications, past family history, past medical history, past social history, past surgical history and problem list     Review of Systems   Constitutional: Negative  HENT: Negative  Eyes: Negative  Respiratory: Negative  Cardiovascular: Negative  Gastrointestinal: Negative  Endocrine: Negative  Genitourinary: Negative  Musculoskeletal: Negative  Skin: Negative  As noted in HPI   Allergic/Immunologic: Negative  Neurological: Negative  Hematological: Negative  Psychiatric/Behavioral: Negative            Objective:      /96   Pulse 77   Temp (!) 95 8 °F (35 4 °C)   Resp 18   Ht 5' 2" (1 575 m)   Wt 104 kg (229 lb)   SpO2 96%   BMI 41 88 kg/m² Physical Exam  Constitutional:       General: She is not in acute distress  Appearance: She is well-developed  She is not diaphoretic  HENT:      Head: Normocephalic and atraumatic  Cardiovascular:      Rate and Rhythm: Normal rate and regular rhythm  Heart sounds: Normal heart sounds  No murmur heard  No friction rub  No gallop  Pulmonary:      Effort: Pulmonary effort is normal  No respiratory distress  Breath sounds: Normal breath sounds  No wheezing or rales  Chest:      Chest wall: No tenderness  Musculoskeletal:         General: Swelling (mild swelling in calves b/l) present  No deformity  Normal range of motion  Cervical back: Normal range of motion and neck supple  Skin:     General: Skin is warm and dry  Comments: Spider veins in lower extremities  Neurological:      Mental Status: She is alert and oriented to person, place, and time  Psychiatric:         Behavior: Behavior normal          Thought Content:  Thought content normal          Judgment: Judgment normal

## 2021-07-21 NOTE — PATIENT INSTRUCTIONS
DASH Eating Plan   WHAT YOU NEED TO KNOW:   What is the DASH Eating Plan? The DASH (Dietary Approaches to Stop Hypertension) Eating Plan is designed to help prevent or lower high blood pressure  It can also help to lower LDL (bad) cholesterol and decrease your risk for heart disease  The plan is low in sodium, sugar, unhealthy fats, and total fat  It is high in potassium, calcium, magnesium, and fiber  These nutrients are added when you eat more fruits, vegetables, and whole grains  What is my sodium limit for each day? Your dietitian will tell you how much sodium is safe for you to have each day  People with high blood pressure should have no more than 1,500 to 2,300 mg of sodium in a day  A teaspoon (tsp) of salt has 2,300 mg of sodium  This may seem like a difficult goal, but small changes to the foods you eat can make a big difference  Your healthcare provider or dietitian can help you create a meal plan that follows your sodium limit  How do I limit sodium? · Read food labels  Food labels can help you choose foods that are low in sodium  The amount of sodium is listed in milligrams (mg)  The % Daily Value (DV) column tells you how much of your daily needs are met by 1 serving of the food for each nutrient listed  Choose foods that have less than 5% of the DV of sodium  These foods are considered low in sodium  Foods that have 20% or more of the DV of sodium are considered high in sodium  Avoid foods that have more than 300 mg of sodium in each serving  Choose foods that say low-sodium, reduced-sodium, or no salt added on the food label  · Avoid salt  Do not salt food at the table, and add very little salt to foods during cooking  Use herbs and spices, such as onions, garlic, and salt-free seasonings to add flavor to foods  Try lemon or lime juice or vinegar to give foods a tart flavor  Use hot peppers or a small amount of hot pepper sauce to add a spicy flavor to foods      · Ask about salt substitutes  Ask your healthcare provider if you may use salt substitutes  Some salt substitutes have ingredients that can be harmful if you have certain health conditions  · Choose foods carefully at restaurants  Meals from restaurants, especially fast food restaurants, are often high in sodium  Some restaurants have nutrition information that tells you the amount of sodium in their foods  Ask to have your food prepared with less, or no salt  What should I know about fats? · Include healthy fats  Examples are unsaturated fats and omega-3 fatty acids  Unsaturated fats are found in soybean, canola, olive, or sunflower oil, and liquid and soft tub margarines  Omega-3 fatty acids are found in fatty fish, such as salmon, tuna, mackerel, and sardines  It is also found in flaxseed oil and ground flaxseed  · Avoid unhealthy fats  Do not eat unhealthy fats, such as saturated fats and trans fats  Saturated fats are found in foods that contain fat from animals  Examples are fatty meats, whole milk, butter, cream, and other dairy foods  It is also found in shortening, stick margarine, palm oil, and coconut oil  Trans fats are found in fried foods, crackers, chips, and baked goods made with margarine or shortening  Which foods should I include? With the DASH eating plan, you need to eat a certain number of servings from each food group  This will help you get enough of certain nutrients and limit others  The amount of servings you should eat depends on how many calories you need  Your dietitian can tell you how many calories you need  The number of servings listed next to the food groups below are for people who need about 2,000 calories each day  · Grains:  6 to 8 servings (3 of these servings should be whole-grain foods)    ?  1 slice of whole-grain bread    ? 1 ounce of dry cereal    ? ½ cup of cooked cereal, pasta, or brown rice    · Vegetables and fruits:  4 to 5 servings of fruits and 4 to 5 servings of vegetables    ? 1 medium fruit    ? 1 cup of raw leafy vegetable    ? ½ cup of frozen, canned (no added salt), or chopped fresh vegetables    ? ½ cup of fresh, frozen, dried, or canned fruit (canned in light syrup or fruit juice)    ? ½ cup of vegetable or fruit juice    · Dairy:  2 to 3 servings    ? 1 cup of nonfat (skim) or 1% milk    ? 1½ ounces of fat-free or low-fat, low-sodium cheese    ? 6 ounces of nonfat or low-fat yogurt    · Lean meat, poultry, and fish:  6 ounces or less    ? Poultry (chicken, turkey) with no skin    ? Fish (especially fatty fish, such as salmon, fresh tuna, or mackerel)    ? Lean beef and pork (loin, round, extra lean hamburger)    ? Egg whites and egg substitutes    · Nuts, seeds, and legumes:  4 to 5 servings each week    ? ½ cup of cooked beans and peas    ? 1½ ounces of unsalted nuts    ? 2 tablespoons of peanut butter or seeds    · Sweets and added sugars:  5 or less each week    ? 1 tablespoon of sugar, jelly, or jam    ? ½ cup of sorbet or gelatin    ? 1 cup of lemonade    · Fats:  2 to 3 servings each week    ? 1 teaspoon of soft margarine or vegetable oil    ? 1 tablespoon of mayonnaise    ? 2 tablespoons of salad dressing    Which foods should I avoid? · Grains:      ? Baked goods, such as doughnuts, pastries, cookies, and biscuits (high in fat and sugar)    ? Mixes for cornbread and biscuits, packaged foods, such as bread stuffing, rice and pasta mixes, macaroni and cheese, and instant cereals (high in sodium)    · Fruits and vegetables:      ? Regular, canned vegetables (high in sodium)    ? Sauerkraut, pickled vegetables, and other foods prepared in brine (high in sodium)    ? Fried vegetables or vegetables in butter or high-fat sauces    ? Fruit in cream or butter sauce (high in fat)    · Dairy:      ? Whole milk, 2% milk, and cream (high in fat)    ?  Regular cheese and processed cheese (high in fat and sodium)    · Meats and protein foods:      ? Smoked or cured meat, such as corned beef, clark, ham, hot dogs, and sausage (high in fat and sodium)    ? Canned beans and canned meats or spreads, such as potted meats, sardines, anchovies, and imitation seafood (high in sodium)    ? Deli or lunch meats, such as bologna, ham, turkey, and roast beef (high in sodium)    ? High-fat meat (T-bone steak, regular hamburger, and ribs)    ? Whole eggs and egg yolks (high in fat)    · Other:      ? Seasonings made with salt, such as garlic salt, celery salt, onion salt, seasoned salt, meat tenderizers, and monosodium glutamate (MSG)    ? Miso soup and canned or dried soup mixes (high in sodium)    ? Regular soy sauce, barbecue sauce, teriyaki sauce, steak sauce, Worcestershire sauce, and most flavored vinegars (high in sodium)    ? Regular condiments, such as mustard, ketchup, and salad dressings (high in sodium)    ? Gravy and sauces, such as Curtis or cheese sauces (high in sodium and fat)    ? Drinks high in sugar, such as soda or fruit drinks    ? Snack foods, such as salted chips, popcorn, pretzels, pork rinds, salted crackers, and salted nuts    ? Frozen foods, such as dinners, entrees, vegetables with sauces, and breaded meats (high in sodium)    What other guidelines should I follow? · Maintain a healthy weight  Your risk for heart disease is higher if you are overweight  Your healthcare provider may suggest that you lose weight if you are overweight  You can lose weight by eating fewer calories and foods that have added sugars and fat  The DASH meal plan can help you do this  Decrease calories by eating smaller portions at each meal and fewer snacks  Ask your healthcare provider for more information about how to lose weight  · Exercise regularly  Regular exercise can help you reach or maintain a healthy weight  Regular exercise can also help decrease your blood pressure and improve your cholesterol levels   Get 30 minutes or more of moderate exercise each day of the week  To lose weight, get at least 60 minutes of exercise  Talk to your healthcare provider about the best exercise program for you  · Limit alcohol  Women should limit alcohol to 1 drink a day  Men should limit alcohol to 2 drinks a day  A drink of alcohol is 12 ounces of beer, 5 ounces of wine, or 1½ ounces of liquor  Where can I find more information? · [facility] and [address]  [address] [address]  [place] [address]  Phone: [phone]  Web Address: [facility] AGREEMENT:   You have the right to help plan your care  Discuss treatment options with your healthcare provider to decide what care you want to receive  You always have the right to refuse treatment  The above information is an  only  It is not intended as medical advice for individual conditions or treatments  Talk to your doctor, nurse or pharmacist before following any medical regimen to see if it is safe and effective for you  © Copyright [employer] 2021 Information is for End User's use only and may not be sold, redistributed or otherwise used for commercial purposes   All illustrations and images included in CareNotes® are the copyrighted property of [employer]  or [address] [place] [address] [facility]

## 2021-07-22 LAB
ALBUMIN SERPL-MCNC: 4.4 G/DL (ref 3.8–4.9)
ALBUMIN/GLOB SERPL: 1.6 {RATIO} (ref 1.2–2.2)
ALP SERPL-CCNC: 107 IU/L (ref 48–121)
ALT SERPL-CCNC: 22 IU/L (ref 0–32)
AST SERPL-CCNC: 23 IU/L (ref 0–40)
BILIRUB SERPL-MCNC: 0.3 MG/DL (ref 0–1.2)
BUN SERPL-MCNC: 15 MG/DL (ref 6–24)
BUN/CREAT SERPL: 16 (ref 9–23)
CALCIUM SERPL-MCNC: 9.3 MG/DL (ref 8.7–10.2)
CHLORIDE SERPL-SCNC: 104 MMOL/L (ref 96–106)
CHOLEST SERPL-MCNC: 236 MG/DL (ref 100–199)
CO2 SERPL-SCNC: 25 MMOL/L (ref 20–29)
CREAT SERPL-MCNC: 0.93 MG/DL (ref 0.57–1)
GLOBULIN SER-MCNC: 2.7 G/DL (ref 1.5–4.5)
GLUCOSE SERPL-MCNC: 88 MG/DL (ref 65–99)
HDLC SERPL-MCNC: 64 MG/DL
LDLC SERPL CALC-MCNC: 161 MG/DL (ref 0–99)
MICRODELETION SYND BLD/T FISH: NORMAL
POTASSIUM SERPL-SCNC: 4.1 MMOL/L (ref 3.5–5.2)
PROT SERPL-MCNC: 7.1 G/DL (ref 6–8.5)
SL AMB EGFR AFRICAN AMERICAN: 81 ML/MIN/1.73
SL AMB EGFR NON AFRICAN AMERICAN: 70 ML/MIN/1.73
SODIUM SERPL-SCNC: 141 MMOL/L (ref 134–144)
TRIGL SERPL-MCNC: 63 MG/DL (ref 0–149)

## 2021-07-27 ENCOUNTER — HOSPITAL ENCOUNTER (OUTPATIENT)
Dept: VASCULAR ULTRASOUND | Facility: HOSPITAL | Age: 56
Discharge: HOME | End: 2021-07-27
Payer: COMMERCIAL

## 2021-07-27 DIAGNOSIS — I83.893 VARICOSE VEINS OF LEG WITH SWELLING, BILATERAL: ICD-10-CM

## 2021-07-27 PROCEDURE — 93970 EXTREMITY STUDY: CPT

## 2021-08-03 PROCEDURE — 93970 EXTREMITY STUDY: CPT | Performed by: SURGERY

## 2021-08-05 ENCOUNTER — OFFICE VISIT (OUTPATIENT)
Dept: FAMILY MEDICINE CLINIC | Facility: CLINIC | Age: 56
End: 2021-08-05
Payer: COMMERCIAL

## 2021-08-05 VITALS
RESPIRATION RATE: 16 BRPM | SYSTOLIC BLOOD PRESSURE: 144 MMHG | DIASTOLIC BLOOD PRESSURE: 80 MMHG | WEIGHT: 229 LBS | BODY MASS INDEX: 42.14 KG/M2 | HEART RATE: 76 BPM | TEMPERATURE: 97.7 F | HEIGHT: 62 IN

## 2021-08-05 DIAGNOSIS — I10 ESSENTIAL HYPERTENSION: Primary | ICD-10-CM

## 2021-08-05 PROCEDURE — 3008F BODY MASS INDEX DOCD: CPT | Performed by: FAMILY MEDICINE

## 2021-08-05 PROCEDURE — 1036F TOBACCO NON-USER: CPT | Performed by: FAMILY MEDICINE

## 2021-08-05 PROCEDURE — 99214 OFFICE O/P EST MOD 30 MIN: CPT | Performed by: FAMILY MEDICINE

## 2021-08-05 RX ORDER — AMLODIPINE BESYLATE 5 MG/1
5 TABLET ORAL DAILY
Qty: 30 TABLET | Refills: 1 | Status: SHIPPED | OUTPATIENT
Start: 2021-08-05

## 2021-08-05 NOTE — PROGRESS NOTES
Assessment/Plan:       Diagnoses and all orders for this visit:    Essential hypertension  -     Given multiple uncontrolled blood pressures both in office and at home, will start amLODIPine (NORVASC) 5 mg tablet; Take 1 tablet (5 mg total) by mouth daily    - She will keep home BP log   - Counseled on diet and exercise  - Follow up in 1 month  Subjective:      Patient ID: Anusha Lezama is a 48 y o  female  Hypertension  This is a new problem  The current episode started 1 to 4 weeks ago  The problem has been gradually improving since onset  The problem is uncontrolled  Pertinent negatives include no anxiety, blurred vision, chest pain, headaches, malaise/fatigue, neck pain, orthopnea, palpitations, peripheral edema, PND, shortness of breath or sweats  Risk factors for coronary artery disease include obesity, stress and sedentary lifestyle  Past treatments include nothing  There are no compliance problems  The following portions of the patient's history were reviewed and updated as appropriate: allergies, current medications, past family history, past medical history, past social history, past surgical history and problem list     Review of Systems   Constitutional: Negative  Negative for malaise/fatigue  HENT: Negative  Eyes: Negative  Negative for blurred vision  Respiratory: Negative  Negative for shortness of breath  Cardiovascular: Negative  Negative for chest pain, palpitations, orthopnea and PND  Gastrointestinal: Negative  Endocrine: Negative  Genitourinary: Negative  Musculoskeletal: Negative  Negative for neck pain  Skin: Negative  Allergic/Immunologic: Negative  Neurological: Negative  Negative for headaches  Hematological: Negative  Psychiatric/Behavioral: Negative            Objective:      /80   Pulse 76   Temp 97 7 °F (36 5 °C)   Resp 16   Ht 5' 2" (1 575 m)   Wt 104 kg (229 lb)   BMI 41 88 kg/m²          Physical Exam  Constitutional:       General: She is not in acute distress  Appearance: Normal appearance  She is well-developed  She is obese  She is not ill-appearing, toxic-appearing or diaphoretic  HENT:      Head: Normocephalic and atraumatic  Cardiovascular:      Rate and Rhythm: Normal rate and regular rhythm  Heart sounds: Normal heart sounds  No murmur heard  No friction rub  No gallop  Pulmonary:      Effort: Pulmonary effort is normal  No respiratory distress  Breath sounds: Normal breath sounds  No wheezing or rales  Chest:      Chest wall: No tenderness  Musculoskeletal:         General: No deformity  Normal range of motion  Cervical back: Normal range of motion and neck supple  Skin:     General: Skin is warm and dry  Neurological:      Mental Status: She is alert and oriented to person, place, and time  Psychiatric:         Behavior: Behavior normal          Thought Content:  Thought content normal          Judgment: Judgment normal

## 2021-08-06 ENCOUNTER — TELEPHONE (OUTPATIENT)
Dept: FAMILY MEDICINE CLINIC | Facility: CLINIC | Age: 56
End: 2021-08-06

## 2021-08-06 DIAGNOSIS — Z12.31 BREAST CANCER SCREENING BY MAMMOGRAM: Primary | ICD-10-CM

## 2021-08-06 NOTE — TELEPHONE ENCOUNTER
----- Message from Elizabeth Hightower sent at 8/5/2021  6:41 PM EDT -----  Regarding: Prescription Question  Contact: [phone]  [facility]    Can you give me a script for my mammogram, please?     Elizabeth Hightower

## 2021-08-27 ENCOUNTER — TELEPHONE (OUTPATIENT)
Dept: FAMILY MEDICINE CLINIC | Facility: CLINIC | Age: 56
End: 2021-08-27

## 2021-08-27 NOTE — TELEPHONE ENCOUNTER
Pt called wanting an order for a mammogram  She is c/o breast pain but wants to get a mammo done first before she comes in to be seen   Coleen Hatchet, LPN

## 2021-08-30 ENCOUNTER — APPOINTMENT (EMERGENCY)
Dept: RADIOLOGY | Facility: HOSPITAL | Age: 56
End: 2021-08-30
Payer: COMMERCIAL

## 2021-08-30 ENCOUNTER — TELEPHONE (OUTPATIENT)
Dept: FAMILY MEDICINE CLINIC | Facility: CLINIC | Age: 56
End: 2021-08-30

## 2021-08-30 ENCOUNTER — HOSPITAL ENCOUNTER (EMERGENCY)
Facility: HOSPITAL | Age: 56
Discharge: HOME | End: 2021-08-30
Attending: EMERGENCY MEDICINE | Admitting: EMERGENCY MEDICINE
Payer: COMMERCIAL

## 2021-08-30 VITALS
DIASTOLIC BLOOD PRESSURE: 92 MMHG | TEMPERATURE: 98.7 F | SYSTOLIC BLOOD PRESSURE: 151 MMHG | OXYGEN SATURATION: 97 % | RESPIRATION RATE: 15 BRPM | HEART RATE: 76 BPM

## 2021-08-30 DIAGNOSIS — R07.9 CHEST PAIN: Primary | ICD-10-CM

## 2021-08-30 LAB
ALBUMIN SERPL BCP-MCNC: 3.2 G/DL (ref 3.5–5)
ALP SERPL-CCNC: 112 U/L (ref 46–116)
ALT SERPL W P-5'-P-CCNC: 24 U/L (ref 12–78)
ANION GAP SERPL CALCULATED.3IONS-SCNC: 8 MMOL/L (ref 4–13)
AST SERPL W P-5'-P-CCNC: 27 U/L (ref 5–45)
BASOPHILS # BLD AUTO: 0.06 THOUSANDS/ΜL (ref 0–0.1)
BASOPHILS NFR BLD AUTO: 1 % (ref 0–1)
BILIRUB SERPL-MCNC: 0.37 MG/DL (ref 0.2–1)
BUN SERPL-MCNC: 11 MG/DL (ref 5–25)
CALCIUM ALBUM COR SERPL-MCNC: 9.1 MG/DL (ref 8.3–10.1)
CALCIUM SERPL-MCNC: 8.5 MG/DL (ref 8.3–10.1)
CHLORIDE SERPL-SCNC: 106 MMOL/L (ref 100–108)
CO2 SERPL-SCNC: 30 MMOL/L (ref 21–32)
CREAT SERPL-MCNC: 0.79 MG/DL (ref 0.6–1.3)
EOSINOPHIL # BLD AUTO: 0.14 THOUSAND/ΜL (ref 0–0.61)
EOSINOPHIL NFR BLD AUTO: 2 % (ref 0–6)
ERYTHROCYTE [DISTWIDTH] IN BLOOD BY AUTOMATED COUNT: 13.2 % (ref 11.6–15.1)
GFR SERPL CREATININE-BSD FRML MDRD: 98 ML/MIN/1.73SQ M
GLUCOSE SERPL-MCNC: 88 MG/DL (ref 65–140)
HCT VFR BLD AUTO: 45.6 % (ref 34.8–46.1)
HGB BLD-MCNC: 14.8 G/DL (ref 11.5–15.4)
IMM GRANULOCYTES # BLD AUTO: 0.03 THOUSAND/UL (ref 0–0.2)
IMM GRANULOCYTES NFR BLD AUTO: 0 % (ref 0–2)
LIPASE SERPL-CCNC: 145 U/L (ref 73–393)
LYMPHOCYTES # BLD AUTO: 4.05 THOUSANDS/ΜL (ref 0.6–4.47)
LYMPHOCYTES NFR BLD AUTO: 46 % (ref 14–44)
MCH RBC QN AUTO: 29.7 PG (ref 26.8–34.3)
MCHC RBC AUTO-ENTMCNC: 32.5 G/DL (ref 31.4–37.4)
MCV RBC AUTO: 92 FL (ref 82–98)
MONOCYTES # BLD AUTO: 0.46 THOUSAND/ΜL (ref 0.17–1.22)
MONOCYTES NFR BLD AUTO: 5 % (ref 4–12)
NEUTROPHILS # BLD AUTO: 4.16 THOUSANDS/ΜL (ref 1.85–7.62)
NEUTS SEG NFR BLD AUTO: 46 % (ref 43–75)
NRBC BLD AUTO-RTO: 0 /100 WBCS
PLATELET # BLD AUTO: 269 THOUSANDS/UL (ref 149–390)
PMV BLD AUTO: 10.5 FL (ref 8.9–12.7)
POTASSIUM SERPL-SCNC: 4.4 MMOL/L (ref 3.5–5.3)
PROT SERPL-MCNC: 7.1 G/DL (ref 6.4–8.2)
RBC # BLD AUTO: 4.98 MILLION/UL (ref 3.81–5.12)
SODIUM SERPL-SCNC: 144 MMOL/L (ref 136–145)
TROPONIN I SERPL-MCNC: <0.02 NG/ML
WBC # BLD AUTO: 8.9 THOUSAND/UL (ref 4.31–10.16)

## 2021-08-30 PROCEDURE — 84484 ASSAY OF TROPONIN QUANT: CPT | Performed by: PHYSICIAN ASSISTANT

## 2021-08-30 PROCEDURE — 96361 HYDRATE IV INFUSION ADD-ON: CPT

## 2021-08-30 PROCEDURE — 96360 HYDRATION IV INFUSION INIT: CPT

## 2021-08-30 PROCEDURE — 93005 ELECTROCARDIOGRAM TRACING: CPT

## 2021-08-30 PROCEDURE — 99285 EMERGENCY DEPT VISIT HI MDM: CPT | Performed by: PHYSICIAN ASSISTANT

## 2021-08-30 PROCEDURE — 36415 COLL VENOUS BLD VENIPUNCTURE: CPT | Performed by: PHYSICIAN ASSISTANT

## 2021-08-30 PROCEDURE — 80053 COMPREHEN METABOLIC PANEL: CPT | Performed by: PHYSICIAN ASSISTANT

## 2021-08-30 PROCEDURE — 71045 X-RAY EXAM CHEST 1 VIEW: CPT

## 2021-08-30 PROCEDURE — 83690 ASSAY OF LIPASE: CPT | Performed by: PHYSICIAN ASSISTANT

## 2021-08-30 PROCEDURE — 99285 EMERGENCY DEPT VISIT HI MDM: CPT

## 2021-08-30 PROCEDURE — 85025 COMPLETE CBC W/AUTO DIFF WBC: CPT | Performed by: PHYSICIAN ASSISTANT

## 2021-08-30 RX ADMIN — SODIUM CHLORIDE 1000 ML: 0.9 INJECTION, SOLUTION INTRAVENOUS at 09:55

## 2021-08-30 NOTE — ED PROVIDER NOTES
History  Chief Complaint   Patient presents with    Chest Pain     c/o CP since 2 days  Pt states last Thursday she had left breast pain then gone followed by left CP dull and achy   Pt denies N/V dizziness and SOb  81-year-old female presenting today for evaluation of left-sided chest pain over the past 2 days that is nonradiating and constant  States that about 1 week ago she left-sided breast pain that was sharp and shooting, this then resolved however then developed left-sided chest pain directly behind the breast where she originally had pain  Has not had any calf pain or swelling  No shortness of breath or diaphoresis  Palpating the chest wall worsens the pain however does not worsened with deep inhalation  It is not accompanied with any other symptoms  She otherwise is feeling well  Felt fatigued yesterday  No history of clotting disorders  No history of smoking  Had COVID last year  No history of clotting disorders  Patient relays that she did reach out her family doctor and is scheduled a mammogram  Denies nausea vomiting, diaphoresis, shortness of breath, calf pain or swelling, fevers, cough, congestion, rash, back pain, abdominal pain, breast swelling  Prior to Admission Medications   Prescriptions Last Dose Informant Patient Reported? Taking?    Multiple Vitamin (multivitamin) capsule 8/29/2021 at Unknown time Self Yes Yes   Sig: Take 1 capsule by mouth daily Taking liquid   Probiotic Product (PROBIOTIC ADVANCED PO) Past Week at Unknown time  Yes Yes   Sig: Take by mouth daily   amLODIPine (NORVASC) 5 mg tablet 8/29/2021 at Unknown time  No Yes   Sig: Take 1 tablet (5 mg total) by mouth daily      Facility-Administered Medications: None       Past Medical History:   Diagnosis Date    Constipation     on occ    Dizziness     inner ear    Headache     History of duodenal ulcer     History of iron deficiency anemia     History of palpitations     History of UTI     with hematuria    Hypertension     Neck pain     Obesity     Refusal of blood transfusions as patient is Yazidism     Wears glasses     will wear occ for reading       Past Surgical History:   Procedure Laterality Date    BARTHOLIN GLAND CYST EXCISION       SECTION      [id number]    COLONOSCOPY      - "no polpyps"    CYSTOSCOPY  2015    diagnostic / Managed by Nelly Rolon / dawson 7/29/15     DILATION AND CURETTAGE OF UTERUS      HYSTERECTOMY      partial-ovaries remain       Family History   Problem Relation Age of Onset    Colon cancer Mother     Hypertension Mother     Hypothyroidism Mother     Cancer Mother         colon w/mets    Prostate cancer Father     Cancer Father         prostate    Diabetes Sister         3 sisters    Breast cancer additional onset Cousin     Heart disease Neg Hx     Stroke Neg Hx      I have reviewed and agree with the history as documented  E-Cigarette/Vaping    E-Cigarette Use Never User      E-Cigarette/Vaping Substances    Nicotine No     THC No     CBD No     Flavoring No     Other No     Unknown No      Social History     Tobacco Use    Smoking status: Former Smoker     Quit date:      Years since quittin 6    Smokeless tobacco: Never Used   Vaping Use    Vaping Use: Never used   Substance Use Topics    Alcohol use: Yes     Alcohol/week: 3 0 standard drinks     Types: 3 Standard drinks or equivalent per week    Drug use: Never       Review of Systems   Constitutional: Negative  Negative for chills, fatigue and fever  HENT: Negative  Negative for congestion, postnasal drip, rhinorrhea and sore throat  Eyes: Negative  Respiratory: Negative  Negative for cough, shortness of breath and wheezing  Cardiovascular: Positive for chest pain  Negative for palpitations and leg swelling  Gastrointestinal: Negative  Negative for abdominal pain, diarrhea, nausea and vomiting  Endocrine: Negative  Genitourinary: Negative  Musculoskeletal: Negative  Skin: Negative  Neurological: Negative  Hematological: Negative  Psychiatric/Behavioral: Negative  All other systems reviewed and are negative  Physical Exam  Physical Exam  Vitals and nursing note reviewed  Constitutional:       General: She is not in acute distress  Appearance: She is well-developed  She is obese  She is not diaphoretic  HENT:      Head: Normocephalic and atraumatic  Right Ear: External ear normal       Left Ear: External ear normal       Nose: Nose normal       Mouth/Throat:      Pharynx: No oropharyngeal exudate  Eyes:      General: No scleral icterus  Right eye: No discharge  Left eye: No discharge  Conjunctiva/sclera: Conjunctivae normal       Pupils: Pupils are equal, round, and reactive to light  Cardiovascular:      Rate and Rhythm: Normal rate and regular rhythm  Pulses: Normal pulses  Heart sounds: Normal heart sounds  No murmur heard  No friction rub  No gallop  Pulmonary:      Effort: Pulmonary effort is normal  No respiratory distress  Breath sounds: Normal breath sounds  No stridor  No wheezing, rhonchi or rales  Comments: spo2 is 96% indicating adequate oxygenation   Chest:      Chest wall: No tenderness  Abdominal:      General: Abdomen is flat  Bowel sounds are normal  There is no distension  Palpations: Abdomen is soft  There is no mass  Tenderness: There is no abdominal tenderness  There is no guarding or rebound  Hernia: No hernia is present  Musculoskeletal:         General: No swelling, tenderness, deformity or signs of injury  Normal range of motion  Arms:       Cervical back: Normal range of motion and neck supple  Right lower leg: No edema  Left lower leg: No edema  Comments: No calf swelling or asymmetries, no tenderness   Lymphadenopathy:      Cervical: No cervical adenopathy     Skin: General: Skin is warm and dry  Capillary Refill: Capillary refill takes less than 2 seconds  Coloration: Skin is not jaundiced or pale  Findings: No bruising, erythema, lesion or rash  Neurological:      General: No focal deficit present  Mental Status: She is alert and oriented to person, place, and time  Mental status is at baseline     Psychiatric:         Mood and Affect: Mood normal          Vital Signs  ED Triage Vitals [08/30/21 0927]   Temperature Pulse Respirations Blood Pressure SpO2   98 7 °F (37 1 °C) 79 20 143/87 96 %      Temp Source Heart Rate Source Patient Position - Orthostatic VS BP Location FiO2 (%)   Tympanic Monitor Lying Right arm --      Pain Score       4           Vitals:    08/30/21 1015 08/30/21 1030 08/30/21 1100 08/30/21 1230   BP: 142/88 143/79 138/88 151/92   Pulse: 72 72 70 76   Patient Position - Orthostatic VS: Lying Lying Lying          Visual Acuity      ED Medications  Medications   sodium chloride 0 9 % bolus 1,000 mL (0 mL Intravenous Stopped 8/30/21 1234)       Diagnostic Studies  Results Reviewed     Procedure Component Value Units Date/Time    Comprehensive metabolic panel [[id number]]  (Abnormal) Collected: 08/30/21 1027    Lab Status: Final result Specimen: Blood from Arm, Right Updated: 08/30/21 1052     Sodium 144 mmol/L      Potassium 4 4 mmol/L      Chloride 106 mmol/L      CO2 30 mmol/L      ANION GAP 8 mmol/L      BUN 11 mg/dL      Creatinine 0 79 mg/dL      Glucose 88 mg/dL      Calcium 8 5 mg/dL      Corrected Calcium 9 1 mg/dL      AST 27 U/L      ALT 24 U/L      Alkaline Phosphatase 112 U/L      Total Protein 7 1 g/dL      Albumin 3 2 g/dL      Total Bilirubin 0 37 mg/dL      eGFR 98 ml/min/1 73sq m     Narrative:      [place] guidelines for Chronic Kidney Disease (CKD):     Stage 1 with normal or high GFR (GFR > 90 mL/min/1 73 square meters)    Stage 2 Mild CKD (GFR = 60-89 mL/min/1 73 square meters)    Stage 3A Moderate CKD (GFR = 45-59 mL/min/1 73 square meters)    Stage 3B Moderate CKD (GFR = 30-44 mL/min/1 73 square meters)    Stage 4 Severe CKD (GFR = 15-29 mL/min/1 73 square meters)    Stage 5 End Stage CKD (GFR <15 mL/min/1 73 square meters)  Note: GFR calculation is accurate only with a steady state creatinine    Lipase [[id number]]  (Normal) Collected: 08/30/21 1027    Lab Status: Final result Specimen: Blood from Arm, Right Updated: 08/30/21 1052     Lipase 145 u/L     Troponin I [[id number]]  (Normal) Collected: 08/30/21 1027    Lab Status: Final result Specimen: Blood from Arm, Right Updated: 08/30/21 1051     Troponin I <0 02 ng/mL     CBC and differential [[id number]]  (Abnormal) Collected: 08/30/21 0953    Lab Status: Final result Specimen: Blood from Arm, Right Updated: 08/30/21 1007     WBC 8 90 Thousand/uL      RBC 4 98 Million/uL      Hemoglobin 14 8 g/dL      Hematocrit 45 6 %      MCV 92 fL      MCH 29 7 pg      MCHC 32 5 g/dL      RDW 13 2 %      MPV 10 5 fL      Platelets 323 Thousands/uL      nRBC 0 /100 WBCs      Neutrophils Relative 46 %      Immat GRANS % 0 %      Lymphocytes Relative 46 %      Monocytes Relative 5 %      Eosinophils Relative 2 %      Basophils Relative 1 %      Neutrophils Absolute 4 16 Thousands/µL      Immature Grans Absolute 0 03 Thousand/uL      Lymphocytes Absolute 4 05 Thousands/µL      Monocytes Absolute 0 46 Thousand/µL      Eosinophils Absolute 0 14 Thousand/µL      Basophils Absolute 0 06 Thousands/µL                  XR chest 1 view portable   ED Interpretation by Jeff Rodriguez PA-C (08/30 1220)   No acute intrapulmonary abnormality                    Procedures  ECG 12 Lead Documentation Only    Date/Time: 8/30/2021 9:57 AM  Performed by: Jeff Rodriguez PA-C  Authorized by: Jeff Rodriguez PA-C     Indications / Diagnosis:  Chest pain   ECG reviewed by me, the ED Provider: yes    Patient location:  ED  Interpretation:     Interpretation: normal    Rate: ECG rate:  85    ECG rate assessment: normal    Rhythm:     Rhythm: sinus rhythm    Ectopy:     Ectopy: none    QRS:     QRS axis:  Normal    QRS intervals:  Normal  Conduction:     Conduction: normal    ST segments:     ST segments:  Normal  T waves:     T waves: normal               ED Course  ED Course as of Aug 30 1457   Mon Aug 30, 2021   1219 Patient does not want any more attempts at blood work                                              MDM  Number of Diagnoses or Management Options  Chest pain  Diagnosis management comments: Highly encouraged that patient continue to follow-up with mammogram   There is no palpable mass or lymphadenopathy  Patient has tenderness on light palpation to the left chest wall just behind the breast where she was originally having pain radiating into the breast   She did have recent travel where she drove a few hours at a time  Patient is informed to return to the emergency department for worsening of symptoms such as worsening pain, SOB, etc and was given proper education regarding their diagnosis and symptoms  Otherwise the patient is informed to follow up with their primary care doctor for re-evaluation  The patient verbalizes understanding and agrees with above assessment and plan  All questions were answered  Please Note: Fluency Direct voice recognition software may have been used in the creation of this document  Wrong words or sound a like substitutions may have occurred due to the inherent limitations of the voice software             Amount and/or Complexity of Data Reviewed  Clinical lab tests: ordered and reviewed  Tests in the radiology section of CPT®: ordered and reviewed  Review and summarize past medical records: yes  Independent visualization of images, tracings, or specimens: yes        Disposition  Final diagnoses:   Chest pain     Time reflects when diagnosis was documented in both MDM as applicable and the Disposition within this note     Time User Action Codes Description Comment    8/30/2021 12:25 PM Marjorie Alfonso Add [R07 89] Atypical chest pain     8/30/2021 12:25 PM Marjorie Alfonso Remove [R07 89] Atypical chest pain     8/30/2021 12:25 PM Marjorie Alfonso Add [R07 9] Chest pain       ED Disposition     ED Disposition Condition Date/Time Comment    Discharge Stable Mon Aug 30, 2021 12:25 PM Agustin Morales discharge to home/self care  Follow-up Information     Follow up With Specialties Details Why Contact Info Additional [address] Emergency Department Emergency Medicine Go to  If symptoms worsen such as difficulty breathing, worsening chest pain, shortness of breath, fevers etc [address] [address] [address] [address] Emergency Department, [place], [place], [address]          Discharge Medication List as of 8/30/2021 12:26 PM      CONTINUE these medications which have NOT CHANGED    Details   amLODIPine (NORVASC) 5 mg tablet Take 1 tablet (5 mg total) by mouth daily, Starting Thu 8/5/2021, Normal      Multiple Vitamin (multivitamin) capsule Take 1 capsule by mouth daily Taking liquid, Historical Med      Probiotic Product (PROBIOTIC ADVANCED PO) Take by mouth daily, Historical Med           No discharge procedures on file      PDMP Review     None          ED Provider  Electronically Signed by           Brendon Dubois PA-C  08/30/21 8146

## 2021-08-30 NOTE — TELEPHONE ENCOUNTER
4 days ago was having breast pain but now the pain yesterday started to go in her chest   Feels like a deep pain in her heart  She slept all day yesterday, very fatigue when she tries to do anything        TRIAGE

## 2021-08-30 NOTE — TELEPHONE ENCOUNTER
Spoke with pt she states she is having constant dull chest pain, unsure if it is heart related or still having her breast pain from a couple days ago  Pt complains of headache, and extreme fatigue, she can only do a little at a time and then she takes a nap  Pt denies any SOB, n/v  Pt states her pain is 3-4/10 and she is starting to get back pain as well  I spoke with Jennifer Penny and pt was advised to go to the ER for evaluation  Pt was agreeable  No further action needed   Rod Zhang, EMMANUELN

## 2021-08-31 LAB
ATRIAL RATE: 85 BPM
P AXIS: 71 DEGREES
PR INTERVAL: 186 MS
QRS AXIS: 34 DEGREES
QRSD INTERVAL: 72 MS
QT INTERVAL: 350 MS
QTC INTERVAL: 416 MS
T WAVE AXIS: 27 DEGREES
VENTRICULAR RATE: 85 BPM

## 2021-08-31 PROCEDURE — 93010 ELECTROCARDIOGRAM REPORT: CPT | Performed by: INTERNAL MEDICINE

## 2021-09-03 ENCOUNTER — OFFICE VISIT (OUTPATIENT)
Dept: FAMILY MEDICINE CLINIC | Facility: CLINIC | Age: 56
End: 2021-09-03
Payer: COMMERCIAL

## 2021-09-03 VITALS
DIASTOLIC BLOOD PRESSURE: 80 MMHG | TEMPERATURE: 97.2 F | OXYGEN SATURATION: 98 % | BODY MASS INDEX: 42.14 KG/M2 | RESPIRATION RATE: 20 BRPM | HEIGHT: 62 IN | SYSTOLIC BLOOD PRESSURE: 138 MMHG | WEIGHT: 229 LBS | HEART RATE: 90 BPM

## 2021-09-03 DIAGNOSIS — I10 ESSENTIAL HYPERTENSION: Primary | ICD-10-CM

## 2021-09-03 DIAGNOSIS — R07.9 CHEST PAIN, UNSPECIFIED TYPE: ICD-10-CM

## 2021-09-03 PROCEDURE — 99214 OFFICE O/P EST MOD 30 MIN: CPT | Performed by: FAMILY MEDICINE

## 2021-09-03 PROCEDURE — 3008F BODY MASS INDEX DOCD: CPT | Performed by: FAMILY MEDICINE

## 2021-09-03 PROCEDURE — 1036F TOBACCO NON-USER: CPT | Performed by: FAMILY MEDICINE

## 2021-09-03 PROCEDURE — 3725F SCREEN DEPRESSION PERFORMED: CPT | Performed by: FAMILY MEDICINE

## 2021-09-03 NOTE — PROGRESS NOTES
Assessment/Plan:       Diagnoses and all orders for this visit:    Essential hypertension  Comments:  improving on current dose of amlodipine, continue  Chest pain (left), unspecified type  Comments:  Reviewed ER visit note from 8/30/21 as well as EKG, blood work and CXR with no abnormalities  She has mammogram and left breast US scheduled for 9/15  Given presentation, I believe that her chest pain is more muscle related  She has tenderness to palpation of chest wall  She cannot tolerate NSAIDs, she will take OTC acetaminophen  Aware to return if symptoms worsen or fail to improve  She does have very large breasts which can be exacerbating pain  Did discuss that she may be a candidate for breast reduction surgery  Subjective:      Patient ID: Alfredo Geller is a 48 y o  female  [place] Call[place] presents today for ER follow up as well as BP check  She reports having chest pain over the past week  Started as left sided breast pain that was shooting in quality  She went to the ER on 8/30/21 for the same  EKG, blood work and CXR with no abnormalities  She has mammogram and ultrasound of left breast scheduled for 9/15/21  She continues to have left chest/breast pain  There is left sided tenderness when she pressures on her chest wall  She has very large breasts and was on a boat trip a few days before pain started where her breasts were bouncing up and down a lot  Hypertension  This is a chronic problem  Associated symptoms include chest pain  Pertinent negatives include no anxiety, blurred vision, headaches, malaise/fatigue, neck pain, orthopnea, palpitations, peripheral edema, PND, shortness of breath or sweats  Past treatments include calcium channel blockers  The current treatment provides moderate improvement  There are no compliance problems           The following portions of the patient's history were reviewed and updated as appropriate: allergies, current medications, past family history, past medical history, past social history, past surgical history and problem list     Review of Systems   Constitutional: Negative for malaise/fatigue  Eyes: Negative for blurred vision  Respiratory: Negative for shortness of breath  Cardiovascular: Positive for chest pain  Negative for palpitations, orthopnea and PND  Musculoskeletal: Negative for neck pain  Neurological: Negative for headaches  Objective:      /80   Pulse 90   Temp (!) 97 2 °F (36 2 °C)   Resp 20   Ht 5' 2" (1 575 m)   Wt 104 kg (229 lb)   SpO2 98%   BMI 41 88 kg/m²          Physical Exam  Constitutional:       General: She is not in acute distress  Appearance: She is well-developed  She is not diaphoretic  HENT:      Head: Normocephalic and atraumatic  Cardiovascular:      Rate and Rhythm: Normal rate and regular rhythm  Heart sounds: Normal heart sounds  No murmur heard  No friction rub  No gallop  Pulmonary:      Effort: Pulmonary effort is normal  No respiratory distress  Breath sounds: Normal breath sounds  No wheezing or rales  Chest:      Chest wall: Tenderness present  Breasts:         Left: No swelling, bleeding, inverted nipple, mass, nipple discharge, skin change or tenderness  Musculoskeletal:         General: No deformity  Normal range of motion  Cervical back: Normal range of motion and neck supple  Skin:     General: Skin is warm and dry  Neurological:      Mental Status: She is alert and oriented to person, place, and time  Psychiatric:         Behavior: Behavior normal          Thought Content:  Thought content normal          Judgment: Judgment normal

## 2021-09-14 ENCOUNTER — HOSPITAL ENCOUNTER (OUTPATIENT)
Dept: RADIOLOGY | Facility: HOSPITAL | Age: 56
Discharge: HOME | End: 2021-09-14
Attending: FAMILY MEDICINE

## 2021-09-15 ENCOUNTER — HOSPITAL ENCOUNTER (OUTPATIENT)
Dept: RADIOLOGY | Facility: HOSPITAL | Age: 56
Discharge: HOME | End: 2021-09-15
Attending: FAMILY MEDICINE
Payer: COMMERCIAL

## 2021-09-15 VITALS — WEIGHT: 229 LBS | HEIGHT: 62 IN | BODY MASS INDEX: 42.14 KG/M2

## 2021-09-15 DIAGNOSIS — Z12.31 BREAST CANCER SCREENING BY MAMMOGRAM: ICD-10-CM

## 2021-09-15 DIAGNOSIS — N63.0 BREAST MASS IN FEMALE: ICD-10-CM

## 2021-09-15 DIAGNOSIS — R92.8 ABNORMAL MAMMOGRAM: ICD-10-CM

## 2021-09-15 PROCEDURE — G0279 TOMOSYNTHESIS, MAMMO: HCPCS

## 2021-09-15 PROCEDURE — 76642 ULTRASOUND BREAST LIMITED: CPT

## 2021-09-15 PROCEDURE — 77066 DX MAMMO INCL CAD BI: CPT

## 2021-09-16 DIAGNOSIS — R92.8 ABNORMAL MAMMOGRAM OF BOTH BREASTS: Primary | ICD-10-CM

## 2022-09-21 ENCOUNTER — OFFICE VISIT (OUTPATIENT)
Dept: FAMILY MEDICINE CLINIC | Facility: CLINIC | Age: 57
End: 2022-09-21
Payer: COMMERCIAL

## 2022-09-21 VITALS
SYSTOLIC BLOOD PRESSURE: 134 MMHG | RESPIRATION RATE: 20 BRPM | OXYGEN SATURATION: 99 % | BODY MASS INDEX: 41.77 KG/M2 | TEMPERATURE: 97.2 F | WEIGHT: 227 LBS | DIASTOLIC BLOOD PRESSURE: 80 MMHG | HEIGHT: 62 IN | HEART RATE: 83 BPM

## 2022-09-21 DIAGNOSIS — M54.2 NECK PAIN ON RIGHT SIDE: Primary | ICD-10-CM

## 2022-09-21 PROCEDURE — 99213 OFFICE O/P EST LOW 20 MIN: CPT | Performed by: FAMILY MEDICINE

## 2022-09-21 PROCEDURE — 3725F SCREEN DEPRESSION PERFORMED: CPT | Performed by: FAMILY MEDICINE

## 2022-09-21 RX ORDER — AZITHROMYCIN 250 MG/1
TABLET, FILM COATED ORAL
Qty: 6 TABLET | Refills: 0 | Status: SHIPPED | OUTPATIENT
Start: 2022-09-21 | End: 2022-09-26

## 2022-09-21 NOTE — LETTER
September 21, 2022     Patient: Santos Echavarria  YOB: 1965  Date of Visit: 9/21/2022      To Whom it May Concern:    Santos Echavarria is under my professional care  John Polk was seen in my office on 9/21/2022  John Polk may return to work on 9/26/22  If you have any questions or concerns, please don't hesitate to call           Sincerely,          Lei Diaz MD

## 2022-09-21 NOTE — PROGRESS NOTES
Name: Ramiro Lyon      : 1965      MRN: [id number]  Encounter Provider: Rebeca Larsno MD  Encounter Date: 2022   Encounter department: [address] [place] [address]     1  Neck pain on right side  Assessment & Plan:  Unclear etiology of her pain behind right ear  She does have some sinus symptoms which can possibly be contributing  Will start azithromycin  The pain can also be musculoskeletal in nature  She has a chiropractor that recently performed manipulation in the area  Discussed heat to the area and taking ibuprofen  If symptoms worsen or fail to improve, can get imaging  Orders:  -     azithromycin (Zithromax) 250 mg tablet; Take 2 tablets (500 mg total) by mouth daily for 1 day, THEN 1 tablet (250 mg total) daily for 4 days  Subjective      She reports 1 week history of pain behind her right ear, facial pain and throat discomfort as well as headache  Now the whole right side of her neck hurts  Denies ear discharge, hearing loss, difficulty swallowing, nausea, vomiting, fevers/chills, dizziness, vision changes  Review of Systems   Constitutional: Negative  HENT: Negative  As noted in HPI   Eyes: Negative  Respiratory: Negative  Cardiovascular: Negative  Gastrointestinal: Negative  Endocrine: Negative  Genitourinary: Negative  Musculoskeletal: Negative  Skin: Negative  Allergic/Immunologic: Negative  Neurological: Negative  Hematological: Negative  Psychiatric/Behavioral: Negative          Current Outpatient Medications on File Prior to Visit   Medication Sig    Multiple Vitamin (multivitamin) capsule Take 1 capsule by mouth daily Taking liquid    Probiotic Product (PROBIOTIC ADVANCED PO) Take by mouth daily    amLODIPine (NORVASC) 5 mg tablet Take 1 tablet (5 mg total) by mouth daily (Patient not taking: Reported on 2022)       Objective     /80   Pulse 83   Temp (!) 97 2 °F (36 2 °C)   Resp 20  5' 2" (1 575 m)   Wt 103 kg (227 lb)   SpO2 99%   BMI 41 52 kg/m²     Physical Exam  Constitutional:       General: She is not in acute distress  Appearance: She is well-developed  She is not diaphoretic  HENT:      Head: Normocephalic and atraumatic  Right Ear: Tympanic membrane, ear canal and external ear normal  There is no impacted cerumen  Left Ear: Tympanic membrane, ear canal and external ear normal  There is no impacted cerumen  Nose: Nose normal  No congestion or rhinorrhea  Mouth/Throat:      Mouth: Mucous membranes are moist       Pharynx: Oropharynx is clear  No oropharyngeal exudate or posterior oropharyngeal erythema  Eyes:      General: No scleral icterus  Right eye: No discharge  Left eye: No discharge  Conjunctiva/sclera: Conjunctivae normal    Cardiovascular:      Rate and Rhythm: Normal rate and regular rhythm  Heart sounds: Normal heart sounds  No murmur heard  No friction rub  No gallop  Pulmonary:      Effort: Pulmonary effort is normal  No respiratory distress  Breath sounds: Normal breath sounds  No wheezing or rales  Chest:      Chest wall: No tenderness  Musculoskeletal:         General: No deformity  Normal range of motion  Cervical back: Normal range of motion and neck supple  Skin:     General: Skin is warm and dry  Neurological:      Mental Status: She is alert and oriented to person, place, and time  Psychiatric:         Behavior: Behavior normal          Thought Content:  Thought content normal          Judgment: Judgment normal        Rebeca Larson MD

## 2022-09-21 NOTE — ASSESSMENT & PLAN NOTE
Unclear etiology of her pain behind right ear  She does have some sinus symptoms which can possibly be contributing  Will start azithromycin  The pain can also be musculoskeletal in nature  She has a chiropractor that recently performed manipulation in the area  Discussed heat to the area and taking ibuprofen  If symptoms worsen or fail to improve, can get imaging

## 2022-10-07 ENCOUNTER — OFFICE VISIT (OUTPATIENT)
Dept: FAMILY MEDICINE CLINIC | Facility: CLINIC | Age: 57
End: 2022-10-07
Payer: COMMERCIAL

## 2022-10-07 VITALS
DIASTOLIC BLOOD PRESSURE: 80 MMHG | RESPIRATION RATE: 16 BRPM | WEIGHT: 222 LBS | TEMPERATURE: 96.4 F | HEIGHT: 61 IN | SYSTOLIC BLOOD PRESSURE: 124 MMHG | BODY MASS INDEX: 41.91 KG/M2 | HEART RATE: 67 BPM | OXYGEN SATURATION: 99 %

## 2022-10-07 DIAGNOSIS — Z13.89 SCREENING FOR CARDIOVASCULAR, RESPIRATORY, AND GENITOURINARY DISEASES: ICD-10-CM

## 2022-10-07 DIAGNOSIS — Z13.6 SCREENING FOR CARDIOVASCULAR, RESPIRATORY, AND GENITOURINARY DISEASES: ICD-10-CM

## 2022-10-07 DIAGNOSIS — Z12.31 ENCOUNTER FOR SCREENING MAMMOGRAM FOR MALIGNANT NEOPLASM OF BREAST: ICD-10-CM

## 2022-10-07 DIAGNOSIS — H91.93 DECREASED HEARING OF BOTH EARS: ICD-10-CM

## 2022-10-07 DIAGNOSIS — Z13.83 SCREENING FOR CARDIOVASCULAR, RESPIRATORY, AND GENITOURINARY DISEASES: ICD-10-CM

## 2022-10-07 DIAGNOSIS — Z11.59 NEED FOR HEPATITIS C SCREENING TEST: ICD-10-CM

## 2022-10-07 DIAGNOSIS — Z13.29 SCREENING FOR THYROID DISORDER: ICD-10-CM

## 2022-10-07 DIAGNOSIS — Z00.00 WELL ADULT EXAM: Primary | ICD-10-CM

## 2022-10-07 DIAGNOSIS — Z11.4 SCREENING FOR HIV (HUMAN IMMUNODEFICIENCY VIRUS): ICD-10-CM

## 2022-10-07 PROCEDURE — 99396 PREV VISIT EST AGE 40-64: CPT | Performed by: FAMILY MEDICINE

## 2022-10-07 NOTE — PROGRESS NOTES
FAMILY PRACTICE HEALTH MAINTENANCE OFFICE VISIT  [facility]    NAME: Santos Echavarria  AGE: 65 y o  SEX: female  : 1965     DATE: 10/7/2022    Assessment and Plan     1  Well adult exam    2  Need for hepatitis C screening test  -     Hepatitis C Antibody ([facility]); Future  -     Hepatitis C Antibody ([facility])    3  Screening for HIV (human immunodeficiency virus)  -     [facility]- Human Immunodeficiency Virus 1/2 Antigen / Antibody ( Fourth Generation) with Reflex Testing; Future    4  Screening for cardiovascular, respiratory, and genitourinary diseases  -     CBC and differential; Future  -     Comprehensive metabolic panel; Future  -     Lipid Panel with Direct LDL reflex; Future  -     CBC and differential  -     Comprehensive metabolic panel  -     Lipid Panel with Direct LDL reflex    5  Screening for thyroid disorder  -     TSH, 3rd generation with Free T4 reflex; Future  -     TSH, 3rd generation with Free T4 reflex    6  Encounter for screening mammogram for malignant neoplasm of breast  -     Mammo screening bilateral w 3d & cad; Future; Expected date: 10/07/2022    7  Decreased hearing of both ears  -     Ambulatory Referral to Audiology; Future      Patient Counseling:   Nutrition: Stressed importance of a well balanced diet, moderation of sodium/saturated fat, caloric balance and sufficient intake of fiber  Exercise: Stressed the importance of regular exercise with a goal of 150 minutes per week  Dental Health: Discussed daily flossing and brushing and regular dental visits   Immunizations reviewed: Risks and Benefits discussed and Declined recommended vaccinations  Discussed benefits of:  Mammogram  and Screening labs  BMI Counseling: Body mass index is 41 95 kg/m²  Discussed with patient's BMI with her   The BMI is above normal  Nutrition recommendations include reducing portion sizes, decreasing overall calorie intake, 3-5 servings of fruits/vegetables daily, reducing fast food intake and consuming healthier snacks  Exercise recommendations include moderate aerobic physical activity for 150 minutes/week  No follow-ups on file  Chief Complaint     Chief Complaint   Patient presents with    Physical Exam     Sas/cma       History of Present Illness     HPI    Well Adult Physical   Patient here for a comprehensive physical exam       Diet and Physical Activity  Diet: well balanced diet  Exercise: frequently      Depression Screen  PHQ-2/9 Depression Screening    Little interest or pleasure in doing things: 0 - not at all  Feeling down, depressed, or hopeless: 0 - not at all  PHQ-2 Score: 0  PHQ-2 Interpretation: Negative depression screen          General Health  Hearing: slightly decreased:  bilateral  Vision: goes for regular eye exams and wears glasses  Dental: no dental visits for >1 year, brushes teeth twice daily and flosses teeth occasionally    Reproductive Health  No issues       The following portions of the patient's history were reviewed and updated as appropriate: allergies, current medications, past family history, past medical history, past social history, past surgical history and problem list     Review of Systems     Review of Systems   Constitutional: Negative  HENT: Negative  Eyes: Negative  Respiratory: Negative  Cardiovascular: Negative  Gastrointestinal: Negative  Endocrine: Negative  Genitourinary: Negative  Musculoskeletal: Negative  Skin: Negative  Allergic/Immunologic: Negative  Neurological: Negative  Hematological: Negative  Psychiatric/Behavioral: Negative          Past Medical History     Past Medical History:   Diagnosis Date    Constipation     on occ    Dizziness     inner ear    Headache     History of duodenal ulcer     History of iron deficiency anemia     History of palpitations     History of UTI     with hematuria    Hypertension  Neck pain     Obesity     Refusal of blood transfusions as patient is Yarsani     Wears glasses     will wear occ for reading       Past Surgical History     Past Surgical History:   Procedure Laterality Date    BARTHOLIN GLAND CYST EXCISION       SECTION      6548,1951    COLONOSCOPY      - "no polpyps"    CYSTOSCOPY  2015    diagnostic / Managed by [facility] / dawson 7/29/15     DILATION AND CURETTAGE OF UTERUS      HYSTERECTOMY      partial-ovaries remain       Social History     Social History     Socioeconomic History    Marital status: /Civil Union     Spouse name: None    Number of children: None    Years of education: None    Highest education level: None   Occupational History    None   Tobacco Use    Smoking status: Former Smoker     Types: Cigarettes     Quit date:      Years since quittin 7    Smokeless tobacco: Never Used   Vaping Use    Vaping Use: Never used   Substance and Sexual Activity    Alcohol use: Yes     Comment: few a month    Drug use: Never    Sexual activity: Yes     Partners: Male     Birth control/protection: Surgical   Other Topics Concern    None   Social History Narrative    None     Social Determinants of Health     Financial Resource Strain: Not on file   Food Insecurity: Not on file   Transportation Needs: Not on file   Physical Activity: Not on file   Stress: Not on file   Social Connections: Not on file   Intimate Partner Violence: Not on file   Housing Stability: Not on file       Family History     Family History   Problem Relation Age of Onset    Colon cancer Mother     Hypertension Mother     Hypothyroidism Mother     Cancer Mother         colon w/mets    Prostate cancer Father     Cancer Father         prostate    Diabetes Sister         3 sisters    Breast cancer additional onset Cousin     Heart disease Neg Hx     Stroke Neg Hx        Current Medications       Current Outpatient Medications:     Multiple Vitamin (multivitamin) capsule, Take 1 capsule by mouth daily Taking liquid, Disp: , Rfl:     Probiotic Product (PROBIOTIC ADVANCED PO), Take by mouth daily, Disp: , Rfl:      Allergies     Allergies   Allergen Reactions    Amoxicillin Anaphylaxis     Throat closes and itching    Other Hives     Eggplant       Objective     /80   Pulse 67   Temp (!) 96 4 °F (35 8 °C)   Resp 16   Ht 5' 1" (1 549 m)   Wt 101 kg (222 lb)   SpO2 99%   BMI 41 95 kg/m²      Physical Exam  Constitutional:       General: She is not in acute distress  Appearance: Normal appearance  She is well-developed  She is not diaphoretic  HENT:      Head: Normocephalic and atraumatic  Right Ear: Tympanic membrane, ear canal and external ear normal  There is no impacted cerumen  Left Ear: Tympanic membrane, ear canal and external ear normal  There is no impacted cerumen  Eyes:      General: No scleral icterus  Right eye: No discharge  Left eye: No discharge  Extraocular Movements: Extraocular movements intact  Conjunctiva/sclera: Conjunctivae normal       Pupils: Pupils are equal, round, and reactive to light  Cardiovascular:      Rate and Rhythm: Normal rate and regular rhythm  Heart sounds: Normal heart sounds  No murmur heard  No friction rub  No gallop  Pulmonary:      Effort: Pulmonary effort is normal  No respiratory distress  Breath sounds: Normal breath sounds  No wheezing or rales  Chest:      Chest wall: No tenderness  Abdominal:      General: Bowel sounds are normal  There is no distension  Palpations: Abdomen is soft  There is no mass  Tenderness: There is no abdominal tenderness  There is no guarding or rebound  Musculoskeletal:         General: No deformity  Normal range of motion  Cervical back: Normal range of motion and neck supple  Skin:     General: Skin is warm and dry  Findings: No erythema or rash  Neurological:      Mental Status: She is alert and oriented to person, place, and time  Psychiatric:         Behavior: Behavior normal          Thought Content:  Thought content normal          Judgment: Judgment normal             Visual Acuity Screening    Right eye Left eye Both eyes   Without correction: 20/25 20/50 20/25   With correction:              [address] [facility]

## 2022-10-11 LAB
ALBUMIN SERPL-MCNC: 4 G/DL (ref 3.8–4.9)
ALBUMIN/GLOB SERPL: 1.5 {RATIO} (ref 1.2–2.2)
ALP SERPL-CCNC: 96 IU/L (ref 44–121)
ALT SERPL-CCNC: 7 IU/L (ref 0–32)
AST SERPL-CCNC: 17 IU/L (ref 0–40)
BASOPHILS # BLD AUTO: 0.1 X10E3/UL (ref 0–0.2)
BASOPHILS NFR BLD AUTO: 1 %
BILIRUB SERPL-MCNC: 0.3 MG/DL (ref 0–1.2)
BUN SERPL-MCNC: 14 MG/DL (ref 6–24)
BUN/CREAT SERPL: 13 (ref 9–23)
CALCIUM SERPL-MCNC: 9.8 MG/DL (ref 8.7–10.2)
CHLORIDE SERPL-SCNC: 107 MMOL/L (ref 96–106)
CHOLEST SERPL-MCNC: 167 MG/DL (ref 100–199)
CO2 SERPL-SCNC: 25 MMOL/L (ref 20–29)
CREAT SERPL-MCNC: 1.05 MG/DL (ref 0.57–1)
EGFR: 62 ML/MIN/1.73
EOSINOPHIL # BLD AUTO: 0.1 X10E3/UL (ref 0–0.4)
EOSINOPHIL NFR BLD AUTO: 1 %
ERYTHROCYTE [DISTWIDTH] IN BLOOD BY AUTOMATED COUNT: 13.4 % (ref 11.7–15.4)
GLOBULIN SER-MCNC: 2.6 G/DL (ref 1.5–4.5)
GLUCOSE SERPL-MCNC: 87 MG/DL (ref 70–99)
HCT VFR BLD AUTO: 42.3 % (ref 34–46.6)
HCV AB S/CO SERPL IA: <0.1 S/CO RATIO (ref 0–0.9)
HDLC SERPL-MCNC: 50 MG/DL
HGB BLD-MCNC: 14.1 G/DL (ref 11.1–15.9)
IMM GRANULOCYTES # BLD: 0 X10E3/UL (ref 0–0.1)
IMM GRANULOCYTES NFR BLD: 0 %
LDLC SERPL CALC-MCNC: 105 MG/DL (ref 0–99)
LDLC/HDLC SERPL: 2.1 RATIO (ref 0–3.2)
LYMPHOCYTES # BLD AUTO: 4.3 X10E3/UL (ref 0.7–3.1)
LYMPHOCYTES NFR BLD AUTO: 51 %
MCH RBC QN AUTO: 29.5 PG (ref 26.6–33)
MCHC RBC AUTO-ENTMCNC: 33.3 G/DL (ref 31.5–35.7)
MCV RBC AUTO: 89 FL (ref 79–97)
MICRODELETION SYND BLD/T FISH: NORMAL
MONOCYTES # BLD AUTO: 0.4 X10E3/UL (ref 0.1–0.9)
MONOCYTES NFR BLD AUTO: 5 %
NEUTROPHILS # BLD AUTO: 3.6 X10E3/UL (ref 1.4–7)
NEUTROPHILS NFR BLD AUTO: 42 %
PLATELET # BLD AUTO: 238 X10E3/UL (ref 150–450)
POTASSIUM SERPL-SCNC: 5.2 MMOL/L (ref 3.5–5.2)
PROT SERPL-MCNC: 6.6 G/DL (ref 6–8.5)
RBC # BLD AUTO: 4.78 X10E6/UL (ref 3.77–5.28)
SL AMB VLDL CHOLESTEROL CALC: 12 MG/DL (ref 5–40)
SODIUM SERPL-SCNC: 146 MMOL/L (ref 134–144)
TRIGL SERPL-MCNC: 64 MG/DL (ref 0–149)
TSH SERPL DL<=0.005 MIU/L-ACNC: 1.25 UIU/ML (ref 0.45–4.5)
WBC # BLD AUTO: 8.5 X10E3/UL (ref 3.4–10.8)

## 2022-10-12 ENCOUNTER — OFFICE VISIT (OUTPATIENT)
Dept: FAMILY MEDICINE CLINIC | Facility: CLINIC | Age: 57
End: 2022-10-12
Payer: COMMERCIAL

## 2022-10-12 VITALS
WEIGHT: 222 LBS | BODY MASS INDEX: 41.91 KG/M2 | HEART RATE: 64 BPM | SYSTOLIC BLOOD PRESSURE: 132 MMHG | OXYGEN SATURATION: 98 % | TEMPERATURE: 97.8 F | HEIGHT: 61 IN | DIASTOLIC BLOOD PRESSURE: 80 MMHG | RESPIRATION RATE: 20 BRPM

## 2022-10-12 DIAGNOSIS — R32 URINARY INCONTINENCE, UNSPECIFIED TYPE: Primary | ICD-10-CM

## 2022-10-12 LAB
SL AMB  POCT GLUCOSE, UA: NEGATIVE
SL AMB LEUKOCYTE ESTERASE,UA: ABNORMAL
SL AMB POCT BILIRUBIN,UA: NEGATIVE
SL AMB POCT BLOOD,UA: ABNORMAL
SL AMB POCT CLARITY,UA: CLEAR
SL AMB POCT COLOR,UA: YELLOW
SL AMB POCT KETONES,UA: NEGATIVE
SL AMB POCT NITRITE,UA: NEGATIVE
SL AMB POCT PH,UA: 5.5
SL AMB POCT SPECIFIC GRAVITY,UA: 1.02
SL AMB POCT URINE PROTEIN: NEGATIVE
SL AMB POCT UROBILINOGEN: ABNORMAL

## 2022-10-12 PROCEDURE — 99213 OFFICE O/P EST LOW 20 MIN: CPT | Performed by: FAMILY MEDICINE

## 2022-10-12 PROCEDURE — 81003 URINALYSIS AUTO W/O SCOPE: CPT | Performed by: FAMILY MEDICINE

## 2022-10-12 RX ORDER — NITROFURANTOIN 25; 75 MG/1; MG/1
100 CAPSULE ORAL 2 TIMES DAILY
Qty: 10 CAPSULE | Refills: 0 | Status: SHIPPED | OUTPATIENT
Start: 2022-10-12 | End: 2022-10-17 | Stop reason: ALTCHOICE

## 2022-10-12 NOTE — PROGRESS NOTES
Name: David Green      : 1965      MRN: [id number]  Encounter Provider: Tin Connolly MD  Encounter Date: 10/12/2022   Encounter department: [address] [place] [address]     1  Urinary incontinence, unspecified type  -     POCT urine dip auto non-scope  -     Urine culture  -     nitrofurantoin (MACROBID) 100 mg capsule; Take 1 capsule (100 mg total) by mouth 2 (two) times a day for 5 days  If culture comes back negative and symptoms persist, will refer to urology  Subjective      She reports having incontinence for the past 5 months, unchanged  Denies fevers, chills, hematuria, dysuria, frequency, urgency, flank pain, nausea, vomiting  Review of Systems   Constitutional: Negative  HENT: Negative  Eyes: Negative  Respiratory: Negative  Cardiovascular: Negative  Gastrointestinal: Negative  Endocrine: Negative  Genitourinary: Negative  Leaking urine  Musculoskeletal: Negative  Skin: Negative  Allergic/Immunologic: Negative  Neurological: Negative  Hematological: Negative  Psychiatric/Behavioral: Negative  Current Outpatient Medications on File Prior to Visit   Medication Sig   • Multiple Vitamin (multivitamin) capsule Take 1 capsule by mouth daily Taking liquid   • Probiotic Product (PROBIOTIC ADVANCED PO) Take by mouth daily       Objective     /80   Pulse 64   Temp 97 8 °F (36 6 °C)   Resp 20   Ht 5' 1" (1 549 m)   Wt 101 kg (222 lb)   SpO2 98%   BMI 41 95 kg/m²     Physical Exam  Constitutional:       General: She is not in acute distress  Appearance: She is well-developed  She is not diaphoretic  HENT:      Head: Normocephalic and atraumatic  Cardiovascular:      Rate and Rhythm: Normal rate and regular rhythm  Heart sounds: Normal heart sounds  No murmur heard  No friction rub  No gallop  Pulmonary:      Effort: Pulmonary effort is normal  No respiratory distress        Breath sounds: Normal breath sounds  No wheezing or rales  Chest:      Chest wall: No tenderness  Abdominal:      General: Abdomen is flat  Bowel sounds are normal  There is no distension  Palpations: Abdomen is soft  There is no mass  Tenderness: There is no abdominal tenderness  There is no right CVA tenderness, left CVA tenderness, guarding or rebound  Hernia: No hernia is present  Musculoskeletal:         General: No deformity  Normal range of motion  Cervical back: Normal range of motion and neck supple  Skin:     General: Skin is warm and dry  Neurological:      Mental Status: She is alert and oriented to person, place, and time  Psychiatric:         Behavior: Behavior normal          Thought Content:  Thought content normal          Judgment: Judgment normal        Louanne Lesch, MD

## 2022-10-14 LAB
BACTERIA UR CULT: NORMAL
Lab: NORMAL

## 2022-10-17 ENCOUNTER — OFFICE VISIT (OUTPATIENT)
Dept: FAMILY MEDICINE CLINIC | Facility: CLINIC | Age: 57
End: 2022-10-17
Payer: COMMERCIAL

## 2022-10-17 VITALS
DIASTOLIC BLOOD PRESSURE: 74 MMHG | SYSTOLIC BLOOD PRESSURE: 118 MMHG | WEIGHT: 224 LBS | HEART RATE: 72 BPM | HEIGHT: 61 IN | BODY MASS INDEX: 42.29 KG/M2 | TEMPERATURE: 97.3 F | RESPIRATION RATE: 20 BRPM

## 2022-10-17 DIAGNOSIS — R31.9 HEMATURIA, UNSPECIFIED TYPE: Primary | ICD-10-CM

## 2022-10-17 DIAGNOSIS — M54.50 ACUTE RIGHT-SIDED LOW BACK PAIN, UNSPECIFIED WHETHER SCIATICA PRESENT: ICD-10-CM

## 2022-10-17 LAB
SL AMB  POCT GLUCOSE, UA: ABNORMAL
SL AMB LEUKOCYTE ESTERASE,UA: ABNORMAL
SL AMB POCT BILIRUBIN,UA: ABNORMAL
SL AMB POCT BLOOD,UA: ABNORMAL
SL AMB POCT CLARITY,UA: ABNORMAL
SL AMB POCT COLOR,UA: ABNORMAL
SL AMB POCT KETONES,UA: ABNORMAL
SL AMB POCT NITRITE,UA: ABNORMAL
SL AMB POCT PH,UA: 6
SL AMB POCT SPECIFIC GRAVITY,UA: 1.02
SL AMB POCT URINE PROTEIN: ABNORMAL
SL AMB POCT UROBILINOGEN: 0.2

## 2022-10-17 PROCEDURE — 81003 URINALYSIS AUTO W/O SCOPE: CPT | Performed by: FAMILY MEDICINE

## 2022-10-17 PROCEDURE — 99214 OFFICE O/P EST MOD 30 MIN: CPT | Performed by: FAMILY MEDICINE

## 2022-10-17 NOTE — LETTER
October 17, 2022     Patient: Tylor Oleary  YOB: 1965  Date of Visit: 10/17/2022      To Whom it May Concern:    Tylor Oleary is under my professional care  Mo Fonseca was seen in my office on 10/17/2022  oM Fonseca may return to work on 10/20/22  If you have any questions or concerns, please don't hesitate to call           Sincerely,          Mary Byers MD

## 2022-10-17 NOTE — PROGRESS NOTES
Name: Gabriella Flores      : 1965      MRN: [id number]  Encounter Provider: Karely Duran MD  Encounter Date: 10/17/2022   Encounter department: [address] [place] [address]     1  Hematuria, unspecified type  -     Urine culture  -     POCT urine dip auto non-scope  -     CT renal stone study abdomen pelvis wo contrast; Future; Expected date: 10/17/2022  -     Ambulatory Referral to Urology; Future    2  Acute right-sided low back pain, unspecified whether sciatica present  Comments:  Counseled on rest, ice/heat to the area, gentle stretches, tylenol  Orders:  -     CT renal stone study abdomen pelvis wo contrast; Future; Expected date: 10/17/2022  -     Ambulatory Referral to Urology; Future         Subjective      Back Pain  This is a new problem  The current episode started in the past 7 days  The problem occurs constantly  The problem is unchanged  Pain location: right flank/right low back  The pain does not radiate  The pain is at a severity of 4/10  Associated symptoms include bladder incontinence (chronic)  Pertinent negatives include no abdominal pain, bowel incontinence, chest pain, dysuria, fever, headaches, leg pain, numbness, paresis, paresthesias, pelvic pain, perianal numbness, tingling, weakness or weight loss  She has tried nothing for the symptoms  Review of Systems   Constitutional: Negative for fever and weight loss  Cardiovascular: Negative for chest pain  Gastrointestinal: Negative for abdominal pain and bowel incontinence  Genitourinary: Positive for bladder incontinence (chronic)  Negative for dysuria and pelvic pain  Musculoskeletal: Positive for back pain  Neurological: Negative for tingling, weakness, numbness, headaches and paresthesias         Current Outpatient Medications on File Prior to Visit   Medication Sig   • Multiple Vitamin (multivitamin) capsule Take 1 capsule by mouth daily Taking liquid   • Probiotic Product (PROBIOTIC ADVANCED PO) Take by mouth daily   • [DISCONTINUED] nitrofurantoin (MACROBID) 100 mg capsule Take 1 capsule (100 mg total) by mouth 2 (two) times a day for 5 days       Objective     /74   Pulse 72   Temp (!) 97 3 °F (36 3 °C)   Resp 20   Ht 5' 1" (1 549 m)   Wt 102 kg (224 lb)   BMI 42 32 kg/m²     Physical Exam  Constitutional:       General: She is not in acute distress  Appearance: She is well-developed  She is not diaphoretic  HENT:      Head: Normocephalic and atraumatic  Cardiovascular:      Rate and Rhythm: Normal rate and regular rhythm  Heart sounds: Normal heart sounds  No murmur heard  No friction rub  No gallop  Pulmonary:      Effort: Pulmonary effort is normal  No respiratory distress  Breath sounds: Normal breath sounds  No wheezing or rales  Chest:      Chest wall: No tenderness  Abdominal:      General: Abdomen is flat  Bowel sounds are normal  There is no distension  Palpations: Abdomen is soft  There is no mass  Tenderness: There is no abdominal tenderness  There is no right CVA tenderness, left CVA tenderness, guarding or rebound  Hernia: No hernia is present  Musculoskeletal:         General: No deformity  Normal range of motion  Cervical back: Normal range of motion and neck supple  Skin:     General: Skin is warm and dry  Neurological:      Mental Status: She is alert and oriented to person, place, and time  Psychiatric:         Behavior: Behavior normal          Thought Content:  Thought content normal          Judgment: Judgment normal        Gasper Bernheim, MD

## 2022-10-20 LAB
BACTERIA UR CULT: ABNORMAL
Lab: ABNORMAL
Lab: ABNORMAL

## 2022-10-21 ENCOUNTER — HOSPITAL ENCOUNTER (OUTPATIENT)
Dept: RADIOLOGY | Facility: HOSPITAL | Age: 57
Discharge: HOME | End: 2022-10-21
Attending: FAMILY MEDICINE
Payer: COMMERCIAL

## 2022-10-21 DIAGNOSIS — M54.50 ACUTE RIGHT-SIDED LOW BACK PAIN, UNSPECIFIED WHETHER SCIATICA PRESENT: ICD-10-CM

## 2022-10-21 DIAGNOSIS — R31.9 HEMATURIA, UNSPECIFIED TYPE: ICD-10-CM

## 2022-10-21 PROCEDURE — 74176 CT ABD & PELVIS W/O CONTRAST: CPT

## 2022-10-21 PROCEDURE — G1004 CDSM NDSC: HCPCS

## 2022-10-28 ENCOUNTER — HOSPITAL ENCOUNTER (OUTPATIENT)
Dept: RADIOLOGY | Facility: HOSPITAL | Age: 57
Discharge: HOME | End: 2022-10-28
Payer: COMMERCIAL

## 2022-10-28 VITALS — HEIGHT: 61 IN | WEIGHT: 229 LBS | BODY MASS INDEX: 43.23 KG/M2

## 2022-10-28 DIAGNOSIS — N63.0 BREAST MASS IN FEMALE: ICD-10-CM

## 2022-10-28 DIAGNOSIS — R92.8 ABNORMAL MAMMOGRAM OF BOTH BREASTS: Primary | ICD-10-CM

## 2022-10-28 DIAGNOSIS — R92.8 ABNORMAL MAMMOGRAM OF BOTH BREASTS: ICD-10-CM

## 2022-10-28 PROCEDURE — G0279 TOMOSYNTHESIS, MAMMO: HCPCS

## 2022-10-28 PROCEDURE — 77066 DX MAMMO INCL CAD BI: CPT

## 2022-10-28 PROCEDURE — 76642 ULTRASOUND BREAST LIMITED: CPT

## 2023-01-10 NOTE — PROGRESS NOTES
Assessment    1  BMI 36 0-36 9,adult (V85 36) (Z68 36)   2  Screening for cardiovascular condition (V81 2) (Z13 6)   3  Screening for diabetes mellitus (DM) (V77 1) (Z13 1)   4  Fatigue (780 79) (R53 83)   5  Encounter for preventive health examination (V70 0) (Z00 00)   6  Breast cancer screening, high risk patient (V76 11) (Z12 39)   7  Back ache (724 5) (M54 9)    Plan  Back ache    · 1 - León HADDAD, Emeli Masters  (Orthopedic Surgery) Physician Referral  Consult, interested  in pursuing breast reduction surgery  Status: Active  Requested for: [id number]  Care Summary provided  : Yes  Breast cancer screening, high risk patient    · * MAMMO SCREENING BILATERAL W CAD; Status:Active; Requested for:02Nov2016;   Breast cancer screening, high risk patient, Health Maintenance, Fatigue, Screening for  cardiovascular condition, Screening for diabetes mellitus (DM)    · (1) CBC/PLT/DIFF; Status:Active; Requested for:02Nov2016;    · (1) COMPREHENSIVE METABOLIC PANEL; Status:Active; Requested for:02Nov2016;    · (1) IRON SATURATION %, TIBC; Status:Active; Requested for:02Nov2016;    · (1) LIPID PANEL FASTING W DIRECT LDL REFLEX; Status:Active; Requested  for:02Nov2016;    · (1) TSH; Status:Active; Requested EDV:[id number];   PMH: History of iron deficiency anemia    · Iron 325 (65 Fe) MG Oral Tablet    Chief Complaint  pt present for a CPE  hg      History of Present Illness  HM, Adult Female: The patient is being seen for a health maintenance evaluation  General Health: The patient's health since the last visit is described as good  Lifestyle:  She consumes a diverse and healthy diet  She has weight concerns  She does not exercise regularly  She does not use tobacco  She denies alcohol use  could do more TLC  Reproductive health:  had hysterectomy but still has ovaries     Screening:   HPI: had hysterectomy, sept 2016, fibroids, still has cerviix still, still has ovaries  painful periods  some anemia before surgery, on Patient was advised he needs new US order for thyroid.  Please advise iron bid but not since sept  no gyn f/u planned  no labs in over 1yr  last colonoscopy 2015 was normal   interested in breast reduction surgery      Review of Systems    Cardiovascular: no chest pain  Respiratory: no shortness of breath  Gastrointestinal: no abdominal pain and no blood in stools  Neurological: no dizziness and no fainting  Active Problems    1  Breast cancer screening, high risk patient (V76 11) (Z12 39)   2  Family history of Cancer, colon   3  Colon cancer screening (V76 51) (Z12 11)   4  Encounter for routine gynecological examination with Papanicolaou smear of cervix   (V72 31,V76 2) (Z01 419)   5  Fibroid, uterine (218 9) (D25 9)   6  Follow-up exam (V67 9) (Z09)   7  Immunization due (V05 9) (Z23)   8  Screening for cardiovascular condition (V81 2) (Z13 6)   9  Screening for diabetes mellitus (DM) (V77 1) (Z13 1)   10  Screening for HPV (human papillomavirus) (V73 81) (Z11 51)    Past Medical History    · History of Bright red rectal bleeding (569 3) (K62 5)   · History of Chronic constipation (564 00) (K59 09)   · History of hematuria (V13 09) (Z87 448)   · History of iron deficiency anemia (V12 3) (Z86 2)   · History of urinary tract infection (V13 02) (Z87 440)   · Preop examination (V72 84) (Z01 818)   · History of Ulcer (707 9)    Surgical History    · History of  Section   · History of Diagnostic Cystoscopy    Family History  Mother    · Family history of Cancer, colon   · Family history of    · Family history of hypertension (V17 49) (Z82 49)   · Family history of hypothyroidism (V18 19) (Z83 49)  Father    · Family history of    · Family history of malignant neoplasm of prostate (V16 42) (Z80 45)    Social History    ·    · Never a smoker   · Non drinker / no alcohol use    Current Meds   1  Iron 325 (65 Fe) MG Oral Tablet; 1 two times a day; Therapy: [id number] to Recorded   2  Multiple Vitamin TABS;    Therapy: (Recorded:2016) to Recorded   3  Probiotic Oral Capsule; USE AS DIRECTED Recorded    Allergies    1  Amoxicillin CAPS    Vitals   Recorded: [id number] 09:09AM Recorded: 09APW0527 03:45OC   Systolic  709   Diastolic  78   Heart Rate  74   Respiration  14   Temperature  97 F   LMP partial hyster 2016    Height  5 ft 2 in   Weight  199 lb 2 oz   BMI Calculated  36 42   BSA Calculated  1 91     Physical Exam    Constitutional   General appearance: No acute distress, well appearing and well nourished  Eyes   Conjunctiva and lids: No swelling, erythema or discharge  Pupils and irises: Equal, round, reactive to light  Ophthalmoscopic examination: Normal fundi and optic discs  Ears, Nose, Mouth, and Throat   External inspection of ears and nose: Normal     Otoscopic examination: Tympanic membranes translucent with normal light reflex  Canals patent without erythema  Hearing: Normal     Nasal mucosa, septum, and turbinates: Normal without edema or erythema  Lips, teeth, and gums: Normal, good dentition  Oropharynx: Normal with no erythema, edema, exudate or lesions  Neck   Neck: Supple, symmetric, trachea midline, no masses  Thyroid: Normal, no thyromegaly  Pulmonary   Respiratory effort: No increased work of breathing or signs of respiratory distress  Auscultation of lungs: Clear to auscultation  Cardiovascular   Auscultation of heart: Normal rate and rhythm, normal S1 and S2, no murmurs  Carotid pulses: 2+ bilaterally  Pedal pulses: 2+ bilaterally  Examination of extremities for edema and/or varicosities: Normal     Abdomen   Abdomen: Non-tender, no masses  Liver and spleen: No hepatomegaly or splenomegaly  Lymphatic   Palpation of lymph nodes in neck: No lymphadenopathy  Musculoskeletal   Gait and station: Normal     Digits and nails: Normal without clubbing or cyanosis  Joints, bones, and muscles: Normal     Skin   Skin and subcutaneous tissue: Normal without rashes or lesions  Palpation of skin and subcutaneous tissue: Normal turgor  Neurologic   Reflexes: 2+ and symmetric  Sensation: No sensory loss  Psychiatric   Judgment and insight: Normal     Mood and affect: Normal        Procedure    Procedure:   Results: 20/20 in both eyes without corrective device, 20/25 in the right eye without corrective device, 20/25 in the left eye without corrective device      Health Management  History of Bright red rectal bleeding   COLONOSCOPY; every 5 years; Last [id number]; Next Due: [id number];  Active    Signatures   Electronically signed by : Vahe Parham DO; Nov 2 2016 11:11PM EST                       (Author)

## 2023-02-15 ENCOUNTER — OFFICE VISIT (OUTPATIENT)
Dept: FAMILY MEDICINE CLINIC | Facility: CLINIC | Age: 58
End: 2023-02-15

## 2023-02-15 VITALS
OXYGEN SATURATION: 99 % | HEART RATE: 75 BPM | RESPIRATION RATE: 18 BRPM | BODY MASS INDEX: 41.95 KG/M2 | DIASTOLIC BLOOD PRESSURE: 86 MMHG | TEMPERATURE: 97.2 F | SYSTOLIC BLOOD PRESSURE: 134 MMHG | WEIGHT: 222 LBS

## 2023-02-15 DIAGNOSIS — R06.02 SHORTNESS OF BREATH: Primary | ICD-10-CM

## 2023-02-15 DIAGNOSIS — R06.2 WHEEZING: ICD-10-CM

## 2023-02-15 RX ORDER — METHYLPREDNISOLONE 4 MG/1
TABLET ORAL
Qty: 21 EACH | Refills: 0 | Status: SHIPPED | OUTPATIENT
Start: 2023-02-15

## 2023-02-15 RX ORDER — ALBUTEROL SULFATE 90 UG/1
2 AEROSOL, METERED RESPIRATORY (INHALATION) EVERY 6 HOURS PRN
Qty: 18 G | Refills: 0 | Status: SHIPPED | OUTPATIENT
Start: 2023-02-15

## 2023-02-15 RX ORDER — AZITHROMYCIN 250 MG/1
TABLET, FILM COATED ORAL
Qty: 6 TABLET | Refills: 0 | Status: SHIPPED | OUTPATIENT
Start: 2023-02-15 | End: 2023-02-20

## 2023-02-15 NOTE — PROGRESS NOTES
Name: Kaila Brar      : 1965      MRN: [id number]  Encounter Provider: Agus Hand MD  Encounter Date: 2/15/2023   Encounter department: [address] [place] [address]  Shortness of breath  -     azithromycin (Zithromax) 250 mg tablet; Take 2 tablets (500 mg total) by mouth daily for 1 day, THEN 1 tablet (250 mg total) daily for 4 days  -     methylPREDNISolone 4 MG tablet therapy pack; Use as directed on package  -     albuterol (PROVENTIL HFA,VENTOLIN HFA) 90 mcg/act inhaler; Inhale 2 puffs every 6 (six) hours as needed for wheezing or shortness of breath (swish/spit after use)  -     XR chest pa & lateral; Future; Expected date: 02/15/2023  -     Complete PFT with post bronchodilator; Future    2  Wheezing  -     azithromycin (Zithromax) 250 mg tablet; Take 2 tablets (500 mg total) by mouth daily for 1 day, THEN 1 tablet (250 mg total) daily for 4 days  -     methylPREDNISolone 4 MG tablet therapy pack; Use as directed on package  -     albuterol (PROVENTIL HFA,VENTOLIN HFA) 90 mcg/act inhaler; Inhale 2 puffs every 6 (six) hours as needed for wheezing or shortness of breath (swish/spit after use)  -     XR chest pa & lateral; Future; Expected date: 02/15/2023  -     Complete PFT with post bronchodilator; Future       Unclear etiology of her 6 week history of shortness of breath, cough, wheezing  No known history of lung disease  For now will treat for possible bronchitis with azithromycin and steroid course  If symptoms persist, recommend a CXR and steroid course  Subjective      Shortness of Breath  Episode onset: 6 weeks ago  The problem occurs daily  The problem has been gradually worsening since onset  Associated symptoms include coughing, dizziness, fatigue and wheezing  Pertinent negatives include no chest pain, chest pressure, hoarseness of voice, leg swelling, orthopnea, palpitations, rhinorrhea, sore throat, stridor or sweats  Nothing aggravates the symptoms  Past treatments include nothing  Her past medical history is significant for tobacco use (in her early 25s, but has quit since then)  There is no history of allergies, anxiety/panic attacks, asthma, bronchiolitis, congenital heart disease, DVT, GERD, PE or spontaneous pneumothorax  Review of Systems   Constitutional: Positive for fatigue  HENT: Negative for hoarse voice, rhinorrhea and sore throat  Respiratory: Positive for cough, shortness of breath and wheezing  Negative for stridor  Cardiovascular: Negative for chest pain, palpitations, orthopnea and leg swelling  Neurological: Positive for dizziness  Current Outpatient Medications on File Prior to Visit   Medication Sig   • Multiple Vitamin (multivitamin) capsule Take 1 capsule by mouth daily Taking liquid   • Probiotic Product (PROBIOTIC ADVANCED PO) Take by mouth daily       Objective     /86   Pulse 75   Temp (!) 97 2 °F (36 2 °C)   Resp 18   Wt 101 kg (222 lb)   SpO2 99%   BMI 41 95 kg/m²     Physical Exam  Constitutional:       General: She is not in acute distress  Appearance: She is well-developed  She is not diaphoretic  HENT:      Head: Normocephalic and atraumatic  Cardiovascular:      Rate and Rhythm: Normal rate and regular rhythm  Heart sounds: Normal heart sounds  No murmur heard  No friction rub  No gallop  Pulmonary:      Effort: Pulmonary effort is normal  No respiratory distress  Breath sounds: Normal breath sounds  No wheezing or rales  Chest:      Chest wall: No tenderness  Musculoskeletal:         General: No deformity  Normal range of motion  Cervical back: Normal range of motion and neck supple  Skin:     General: Skin is warm and dry  Neurological:      Mental Status: She is alert and oriented to person, place, and time  Psychiatric:         Behavior: Behavior normal          Thought Content:  Thought content normal          Judgment: Judgment normal        Yolanda Loco Luque MD

## 2023-03-15 ENCOUNTER — OFFICE VISIT (OUTPATIENT)
Dept: FAMILY MEDICINE CLINIC | Facility: CLINIC | Age: 58
End: 2023-03-15

## 2023-03-15 ENCOUNTER — APPOINTMENT (OUTPATIENT)
Dept: RADIOLOGY | Facility: CLINIC | Age: 58
End: 2023-03-15

## 2023-03-15 VITALS
BODY MASS INDEX: 40.67 KG/M2 | SYSTOLIC BLOOD PRESSURE: 118 MMHG | HEART RATE: 84 BPM | OXYGEN SATURATION: 97 % | HEIGHT: 62 IN | TEMPERATURE: 96.9 F | WEIGHT: 221 LBS | DIASTOLIC BLOOD PRESSURE: 70 MMHG | RESPIRATION RATE: 18 BRPM

## 2023-03-15 DIAGNOSIS — R06.2 WHEEZING: ICD-10-CM

## 2023-03-15 DIAGNOSIS — R06.02 SHORTNESS OF BREATH: ICD-10-CM

## 2023-03-15 DIAGNOSIS — M79.605 LEFT LEG PAIN: Primary | ICD-10-CM

## 2023-03-15 RX ORDER — AZITHROMYCIN 250 MG/1
TABLET, FILM COATED ORAL
Qty: 6 TABLET | Refills: 0 | Status: SHIPPED | OUTPATIENT
Start: 2023-03-15 | End: 2023-03-20

## 2023-03-15 RX ORDER — METHYLPREDNISOLONE 4 MG/1
TABLET ORAL
Qty: 21 EACH | Refills: 0 | Status: SHIPPED | OUTPATIENT
Start: 2023-03-15

## 2023-03-15 NOTE — ASSESSMENT & PLAN NOTE
Possibly a sprain, however will rule out DVT with doppler  Counseled on rest, ice to the area, gentle stretches, OTC tylenol  She is also being prescribed a steroid course for her wheezing x 2 months which may help with muscle pain

## 2023-03-15 NOTE — PROGRESS NOTES
Name: Armand Meyers      : 1965      MRN: [id number]  Encounter Provider: Nisreen Chen MD  Encounter Date: 3/15/2023   Encounter department: [address] [place] [address]     1  Left leg pain  Assessment & Plan:  Possibly a sprain, however will rule out DVT with doppler  Counseled on rest, ice to the area, gentle stretches, OTC tylenol  She is also being prescribed a steroid course for her wheezing x 2 months which may help with muscle pain  Orders:  -     VAS lower limb venous duplex study, unilateral/limited; Future; Expected date: 03/15/2023    2  Wheezing  Comments: Will repeat steroid and azithromycin which helped her symptoms last month  She does have albuterol inhaler if needed  Chest xray ordered as well  Orders:  -     azithromycin (Zithromax) 250 mg tablet; Take 2 tablets (500 mg total) by mouth daily for 1 day, THEN 1 tablet (250 mg total) daily for 4 days  -     methylPREDNISolone 4 MG tablet therapy pack; Use as directed on package           Subjective      HPI   Iron Rodriguez presents today for extreme pain in her left lower extremity- near left hip- which began suddenly yesterday when she woke up  Located on the lateral left thigh, radiates upward toward her back  Hurts when she puts pressure in the area or if she changes position  Aching in quality  No rash  She was walking more than usual prior to symptoms starting  No swelling noted  No chest pain, shortness of breath, palpitations, tachycardia, fevers/chills, dizziness/light headedness  No known/family hx of blood clots  She continues to have wheezing with a cough which she has had for over 2 months now  Has tried antibiotic, steroid course which initially improved symptoms, but then they started again  She thinks it may be related to something she is exposed to at work  The wheezing is worse at night  Gradually worsening  Review of Systems   Constitutional: Negative  HENT: Negative  Eyes: Negative  Respiratory: Positive for cough and wheezing  Cardiovascular: Negative  Gastrointestinal: Negative  Endocrine: Negative  Genitourinary: Negative  Musculoskeletal: Negative  Left lower extremity pain   Skin: Negative  Allergic/Immunologic: Negative  Neurological: Negative  Hematological: Negative  Psychiatric/Behavioral: Negative  Current Outpatient Medications on File Prior to Visit   Medication Sig   • Multiple Vitamin (multivitamin) capsule Take 1 capsule by mouth daily Taking liquid   • Probiotic Product (PROBIOTIC ADVANCED PO) Take by mouth daily   • albuterol (PROVENTIL HFA,VENTOLIN HFA) 90 mcg/act inhaler Inhale 2 puffs every 6 (six) hours as needed for wheezing or shortness of breath (swish/spit after use) (Patient not taking: Reported on 3/15/2023)   • [DISCONTINUED] methylPREDNISolone 4 MG tablet therapy pack Use as directed on package (Patient not taking: Reported on 3/15/2023)       Objective     /70   Pulse 84   Temp (!) 96 9 °F (36 1 °C)   Resp 18   Ht 5' 2" (1 575 m)   Wt 100 kg (221 lb)   SpO2 97%   BMI 40 42 kg/m²     Physical Exam  Constitutional:       General: She is not in acute distress  Appearance: She is well-developed  She is not diaphoretic  HENT:      Head: Normocephalic and atraumatic  Cardiovascular:      Rate and Rhythm: Normal rate and regular rhythm  Heart sounds: Normal heart sounds  No murmur heard  No friction rub  No gallop  Pulmonary:      Effort: Pulmonary effort is normal  No respiratory distress  Breath sounds: Normal breath sounds  No wheezing or rales  Chest:      Chest wall: No tenderness  Musculoskeletal:         General: No deformity  Cervical back: Normal range of motion and neck supple  Right hip: Normal       Left hip: Tenderness present  No deformity, lacerations, bony tenderness or crepitus  Normal range of motion  Normal strength        Right upper leg: Normal       Left upper leg: Tenderness (lateral aspect) present  No swelling, edema, deformity or lacerations  Skin:     General: Skin is warm and dry  Neurological:      Mental Status: She is alert and oriented to person, place, and time  Psychiatric:         Behavior: Behavior normal          Thought Content:  Thought content normal          Judgment: Judgment normal        Tiffanie Martin MD

## 2023-09-22 ENCOUNTER — HOSPITAL ENCOUNTER (EMERGENCY)
Facility: HOSPITAL | Age: 58
Discharge: HOME | End: 2023-09-22
Attending: STUDENT IN AN ORGANIZED HEALTH CARE EDUCATION/TRAINING PROGRAM
Payer: COMMERCIAL

## 2023-09-22 ENCOUNTER — APPOINTMENT (EMERGENCY)
Dept: RADIOLOGY | Facility: HOSPITAL | Age: 58
End: 2023-09-22
Payer: COMMERCIAL

## 2023-09-22 VITALS
DIASTOLIC BLOOD PRESSURE: 97 MMHG | TEMPERATURE: 98 F | BODY MASS INDEX: 39.75 KG/M2 | RESPIRATION RATE: 16 BRPM | SYSTOLIC BLOOD PRESSURE: 165 MMHG | HEIGHT: 62 IN | OXYGEN SATURATION: 98 % | HEART RATE: 74 BPM | WEIGHT: 216 LBS

## 2023-09-22 DIAGNOSIS — S16.1XXA ACUTE STRAIN OF NECK MUSCLE, INITIAL ENCOUNTER: ICD-10-CM
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
## 2023-09-22 PROCEDURE — 72125 CT NECK SPINE W/O DYE: CPT

## 2023-09-22 PROCEDURE — G1004 CDSM NDSC: HCPCS

## 2023-09-22 PROCEDURE — 99284 EMERGENCY DEPT VISIT MOD MDM: CPT | Performed by: STUDENT IN AN ORGANIZED HEALTH CARE EDUCATION/TRAINING PROGRAM

## 2023-09-22 PROCEDURE — 99284 EMERGENCY DEPT VISIT MOD MDM: CPT

## 2023-09-22 RX ORDER — LIDOCAINE 50 MG/G
2 PATCH TOPICAL ONCE
Status: DISCONTINUED | OUTPATIENT
Start: 2023-09-22 | End: 2023-09-22 | Stop reason: HOSPADM

## 2023-09-22 RX ORDER — ACETAMINOPHEN 325 MG/1
650 TABLET ORAL ONCE
Status: COMPLETED | OUTPATIENT
Start: 2023-09-22 | End: 2023-09-22

## 2023-09-22 RX ADMIN — LIDOCAINE 2 PATCH: 50 PATCH CUTANEOUS at 08:11

## 2023-09-22 RX ADMIN — ACETAMINOPHEN 650 MG: 325 TABLET ORAL at 08:11
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
## 2024-01-02 DIAGNOSIS — Z11.1 SCREENING-PULMONARY TB: Primary | ICD-10-CM

## 2024-01-05 ENCOUNTER — OFFICE VISIT (OUTPATIENT)
Dept: URGENT CARE | Age: 59
End: 2024-01-05
Payer: COMMERCIAL

## 2024-01-05 VITALS
OXYGEN SATURATION: 96 % | TEMPERATURE: 97.6 F | DIASTOLIC BLOOD PRESSURE: 90 MMHG | SYSTOLIC BLOOD PRESSURE: 138 MMHG | RESPIRATION RATE: 18 BRPM | HEART RATE: 84 BPM

## 2024-01-05 DIAGNOSIS — R39.9 UTI SYMPTOMS: ICD-10-CM

## 2024-01-05 DIAGNOSIS — N30.90 RECURRENT CYSTITIS: Primary | ICD-10-CM

## 2024-01-05 LAB
SL AMB  POCT GLUCOSE, UA: ABNORMAL
SL AMB LEUKOCYTE ESTERASE,UA: ABNORMAL
SL AMB POCT BILIRUBIN,UA: ABNORMAL
SL AMB POCT BLOOD,UA: ABNORMAL
SL AMB POCT CLARITY,UA: ABNORMAL
SL AMB POCT COLOR,UA: YELLOW
SL AMB POCT KETONES,UA: ABNORMAL
SL AMB POCT NITRITE,UA: ABNORMAL
SL AMB POCT PH,UA: 6.5
SL AMB POCT SPECIFIC GRAVITY,UA: 1.01
SL AMB POCT URINE PROTEIN: 30
SL AMB POCT UROBILINOGEN: ABNORMAL

## 2024-01-05 PROCEDURE — 87186 SC STD MICRODIL/AGAR DIL: CPT | Performed by: STUDENT IN AN ORGANIZED HEALTH CARE EDUCATION/TRAINING PROGRAM

## 2024-01-05 PROCEDURE — 87077 CULTURE AEROBIC IDENTIFY: CPT | Performed by: STUDENT IN AN ORGANIZED HEALTH CARE EDUCATION/TRAINING PROGRAM

## 2024-01-05 PROCEDURE — 81002 URINALYSIS NONAUTO W/O SCOPE: CPT | Performed by: STUDENT IN AN ORGANIZED HEALTH CARE EDUCATION/TRAINING PROGRAM

## 2024-01-05 PROCEDURE — 87086 URINE CULTURE/COLONY COUNT: CPT | Performed by: STUDENT IN AN ORGANIZED HEALTH CARE EDUCATION/TRAINING PROGRAM

## 2024-01-05 PROCEDURE — G0382 LEV 3 HOSP TYPE B ED VISIT: HCPCS | Performed by: STUDENT IN AN ORGANIZED HEALTH CARE EDUCATION/TRAINING PROGRAM

## 2024-01-05 RX ORDER — SULFAMETHOXAZOLE AND TRIMETHOPRIM 800; 160 MG/1; MG/1
1 TABLET ORAL EVERY 12 HOURS SCHEDULED
Qty: 6 TABLET | Refills: 0 | Status: SHIPPED | OUTPATIENT
Start: 2024-01-05 | End: 2024-01-08

## 2024-01-05 NOTE — PROGRESS NOTES
[facility]        NAME: Jennifer Armas is a 58 y.o. female  : 1965    MRN: [id number]  DATE: 2024  TIME: 9:24 AM    Assessment and Orders   Recurrent cystitis [N30.90]  1. Recurrent cystitis  sulfamethoxazole-trimethoprim (BACTRIM DS) 800-160 mg per tablet      2. UTI symptoms  POCT urine dip    Urine culture            Plan and Discussion      Symptoms and exam consistent with recurrent cystitis, likely not fully treated with course of Macrobid.  Will treat with Bactrim x 3 days.  UA was positive for large blood and large leukocytes.  Will follow-up with urine culture to ensure susceptibility.      Supportive care includes increasing hydration to flush  system and avoidance of bladder irritants such as citrus, caffeine, chocolate and coffee.  Encouraged use of over-the-counter Azo for symptom relief.    Discussed with patient that there are multiple conditions that can mimic UTI symptoms including STDs, bacterial vaginosis, yeast infections, kidney stones and very rarely bladder cancer.  If urine culture does not reveal bacteria or if patient continues to have symptoms after finishing antibiotics, they should follow-up with primary care doctor for further evaluation.    If patient develops worsening flank pain or fevers, this should be evaluated in the emergency room as these are signs of pyelonephritis.    Discussed ED precautions including (but not limited to)  Difficultly breathing or shortness of breath  Chest pain  Acutely worsening symptoms.     Risks and benefits discussed. Patient understands and agrees with the plan.     Follow up with PCP.     Chief Complaint     Chief Complaint   Patient presents with    Possible UTI     Dysuria and burning started 2 weeks ago.     Swollen Glands         History of Present Illness       HPI  - symptoms started- burning, frequency  - sent message to PCP who was on vacation   - saw online doctor who prescribed medication -  "Macrobid  Could not fill prescription until the day after Trey   Finished antibiotics on 12/30.  Symptoms 90% resolved.   1/4/2024 started with incomplete urination, frequency    Review of Systems   Review of Systems   Constitutional:  Negative for fever.   Genitourinary:  Positive for dysuria, frequency, urgency and vaginal pain (\"irritation\"). Negative for vaginal bleeding (bactrim) and vaginal discharge.         Current Medications       Current Outpatient Medications:     Multiple Vitamin (multivitamin) capsule, Take 1 capsule by mouth daily Taking liquid, Disp: , Rfl:     Probiotic Product (PROBIOTIC ADVANCED PO), Take by mouth daily, Disp: , Rfl:     sulfamethoxazole-trimethoprim (BACTRIM DS) 800-160 mg per tablet, Take 1 tablet by mouth every 12 (twelve) hours for 3 days, Disp: 6 tablet, Rfl: 0    albuterol (PROVENTIL HFA,VENTOLIN HFA) 90 mcg/act inhaler, Inhale 2 puffs every 6 (six) hours as needed for wheezing or shortness of breath (swish/spit after use) (Patient not taking: Reported on 3/15/2023), Disp: 18 g, Rfl: 0    methylPREDNISolone 4 MG tablet therapy pack, Use as directed on package (Patient not taking: Reported on 1/5/2024), Disp: 21 each, Rfl: 0    Current Allergies     Allergies as of 01/05/2024 - Reviewed 01/05/2024   Allergen Reaction Noted    Amoxicillin Anaphylaxis 03/23/2015    Other Hives 02/05/2020            The following portions of the patient's history were reviewed and updated as appropriate: allergies, current medications, past family history, past medical history, past social history, past surgical history and problem list.     Past Medical History:   Diagnosis Date    Constipation     on occ    Dizziness     inner ear    Headache     History of duodenal ulcer     History of iron deficiency anemia     History of palpitations     History of UTI     with hematuria    Hypertension     Neck pain     Obesity     Refusal of blood transfusions as patient is Pentec[employer]al     " "Wears glasses     will wear occ for reading       Past Surgical History:   Procedure Laterality Date    BARTHOLIN GLAND CYST EXCISION       SECTION      ,    COLONOSCOPY      - \"no polpyps\"    CYSTOSCOPY  2015    diagnostic / Managed by Grant Mora / dawson 7/29/15     DILATION AND CURETTAGE OF UTERUS      HYSTERECTOMY      partial-ovaries remain       Family History   Problem Relation Age of Onset    Colon cancer Mother     Hypertension Mother     Hypothyroidism Mother     Cancer Mother         colon w/mets    Prostate cancer Father     Cancer Father         prostate    Diabetes Sister         3 sisters    Breast cancer additional onset Cousin     Heart disease Neg Hx     Stroke Neg Hx          Medications have been verified.        Objective   /90   Pulse 84   Temp 97.6 °F (36.4 °C)   Resp 18   SpO2 96%   No LMP recorded. Patient has had a hysterectomy.       Physical Exam     Physical Exam  Constitutional:       General: She is not in acute distress.  Cardiovascular:      Rate and Rhythm: Normal rate and regular rhythm.   Pulmonary:      Effort: Pulmonary effort is normal. No respiratory distress.   Abdominal:      General: There is no distension.      Tenderness: There is no abdominal tenderness. There is no right CVA tenderness or left CVA tenderness.   Neurological:      Mental Status: She is alert.   Psychiatric:         Mood and Affect: Mood normal.               Valeria Rios DO       "

## 2024-01-07 LAB — BACTERIA UR CULT: ABNORMAL

## 2024-02-21 PROBLEM — Z13.6 SCREENING FOR CARDIOVASCULAR, RESPIRATORY, AND GENITOURINARY DISEASES: Status: RESOLVED | Noted: 2018-09-21 | Resolved: 2024-02-21

## 2024-02-21 PROBLEM — Z13.83 SCREENING FOR CARDIOVASCULAR, RESPIRATORY, AND GENITOURINARY DISEASES: Status: RESOLVED | Noted: 2018-09-21 | Resolved: 2024-02-21

## 2024-02-21 PROBLEM — Z13.89 SCREENING FOR CARDIOVASCULAR, RESPIRATORY, AND GENITOURINARY DISEASES: Status: RESOLVED | Noted: 2018-09-21 | Resolved: 2024-02-21

## 2024-02-21 PROBLEM — Z00.00 HEALTHCARE MAINTENANCE: Status: RESOLVED | Noted: 2018-09-21 | Resolved: 2024-02-21

## 2024-02-21 PROBLEM — Z13.1 SCREENING FOR DIABETES MELLITUS (DM): Status: RESOLVED | Noted: 2019-11-13 | Resolved: 2024-02-21

## 2024-02-21 PROBLEM — Z12.39 BREAST CANCER SCREENING: Status: RESOLVED | Noted: 2018-09-11 | Resolved: 2024-02-21

## 2024-03-09 ENCOUNTER — OFFICE VISIT (OUTPATIENT)
Dept: URGENT CARE | Facility: CLINIC | Age: 59
End: 2024-03-09

## 2024-03-09 VITALS
HEIGHT: 62 IN | HEART RATE: 90 BPM | OXYGEN SATURATION: 99 % | RESPIRATION RATE: 20 BRPM | BODY MASS INDEX: 41.77 KG/M2 | WEIGHT: 227 LBS | TEMPERATURE: 98.2 F | SYSTOLIC BLOOD PRESSURE: 164 MMHG | DIASTOLIC BLOOD PRESSURE: 88 MMHG

## 2024-03-09 DIAGNOSIS — K04.7 DENTAL INFECTION: Primary | ICD-10-CM

## 2024-03-09 RX ORDER — CLINDAMYCIN HYDROCHLORIDE 300 MG/1
300 CAPSULE ORAL 3 TIMES DAILY
Qty: 21 CAPSULE | Refills: 0 | Status: SHIPPED | OUTPATIENT
Start: 2024-03-09 | End: 2024-03-16

## 2024-03-09 NOTE — PROGRESS NOTES
[facility]        NAME: Jennifer Armas is a 58 y.o. female  : 1965    MRN: [id number]  DATE: 2024  TIME: 11:55 AM    Assessment and Plan   Dental infection [K04.7]  1. Dental infection  clindamycin (CLEOCIN) 300 MG capsule            Patient Instructions   Dental infection:   - Will prescribe Clindamycin to be taken as prescribed. Take with food and a probiotic. We discussed risk for diarrhea and precautions.   -Stay well hydrated and rest   -You can do warm salt water gargles.   -Warm compress on the facial area for comfort   -Eat soft foods  -Advil or Tylenol for pain   -Dental follow up in the next 48 hours. If your sx worsen go immediately to the ED.    Follow up with PCP in 3-5 days.  Proceed to  ER if symptoms worsen.    If tests have been performed at [facility], our office will contact you with results if changes need to be made to the care plan discussed with you at the visit.  [facility]    Chief Complaint     Chief Complaint   Patient presents with    Dental Pain     Dental pain rt side upper x 2 days          History of Present Illness       The patient presents today for dental pain x 2 days. She states that she has pain over the R upper jaw. She states that the pain radiates into her R ear. She states that her dentist told her to go to  or ED for an antibiotic as he can't see her until 3/11/24. She has no fever or chills. No drooling or stridor. No dyspnea. No neck swelling. She has decreased PO intake due to the pain. No OTC measures. She states that she has a cavity that needs to be filled and repaired.         Review of Systems   Review of Systems   Constitutional:  Negative for activity change, appetite change, chills, diaphoresis, fatigue and fever.   HENT:  Positive for dental problem. Negative for congestion, ear discharge, ear pain, facial swelling, hearing loss, postnasal drip, rhinorrhea, sinus pressure, sinus pain,  sore throat, tinnitus, trouble swallowing and voice change.    Eyes:  Negative for visual disturbance.   Respiratory:  Negative for apnea, cough, chest tightness, shortness of breath, wheezing and stridor.    Cardiovascular:  Negative for chest pain, palpitations and leg swelling.   Gastrointestinal:  Negative for abdominal distention and abdominal pain.   Genitourinary:  Negative for decreased urine volume.   Musculoskeletal:  Negative for arthralgias, joint swelling, myalgias, neck pain and neck stiffness.   Skin:  Negative for rash.   Allergic/Immunologic: Negative for immunocompromised state.   Neurological:  Negative for dizziness, weakness, light-headedness, numbness and headaches.   Hematological:  Negative for adenopathy.         Current Medications       Current Outpatient Medications:     clindamycin (CLEOCIN) 300 MG capsule, Take 1 capsule (300 mg total) by mouth 3 (three) times a day for 7 days, Disp: 21 capsule, Rfl: 0    Multiple Vitamin (multivitamin) capsule, Take 1 capsule by mouth daily Taking liquid, Disp: , Rfl:     Probiotic Product (PROBIOTIC ADVANCED PO), Take by mouth daily, Disp: , Rfl:     albuterol (PROVENTIL HFA,VENTOLIN HFA) 90 mcg/act inhaler, Inhale 2 puffs every 6 (six) hours as needed for wheezing or shortness of breath (swish/spit after use) (Patient not taking: Reported on 3/15/2023), Disp: 18 g, Rfl: 0    methylPREDNISolone 4 MG tablet therapy pack, Use as directed on package (Patient not taking: Reported on 1/5/2024), Disp: 21 each, Rfl: 0    Current Allergies     Allergies as of 03/09/2024 - Reviewed 03/09/2024   Allergen Reaction Noted    Amoxicillin Anaphylaxis 03/23/2015    Other Hives 02/05/2020            The following portions of the patient's history were reviewed and updated as appropriate: allergies, current medications, past family history, past medical history, past social history, past surgical history and problem list.     Past Medical History:   Diagnosis Date     "Constipation     on occ    Dizziness     inner ear    Headache     History of duodenal ulcer     History of iron deficiency anemia     History of palpitations     History of UTI     with hematuria    Hypertension     Neck pain     Obesity     Refusal of blood transfusions as patient is Jewish     Wears glasses     will wear occ for reading       Past Surgical History:   Procedure Laterality Date    BARTHOLIN GLAND CYST EXCISION       SECTION      ,    COLONOSCOPY      - \"no polpyps\"    CYSTOSCOPY  2015    diagnostic / Managed by Grant Mora / dawson 7/29/15     DILATION AND CURETTAGE OF UTERUS      HYSTERECTOMY      partial-ovaries remain       Family History   Problem Relation Age of Onset    Colon cancer Mother     Hypertension Mother     Hypothyroidism Mother     Cancer Mother         colon w/mets    Prostate cancer Father     Cancer Father         prostate    Diabetes Sister         3 sisters    Breast cancer additional onset Cousin     Heart disease Neg Hx     Stroke Neg Hx          Medications have been verified.        Objective   /88   Pulse 90   Temp 98.2 °F (36.8 °C)   Resp 20   Ht 5' 2\" (1.575 m)   Wt 103 kg (227 lb)   SpO2 99%   BMI 41.52 kg/m²   No LMP recorded. Patient has had a hysterectomy.       Physical Exam     Physical Exam  Vitals and nursing note reviewed.   Constitutional:       General: She is not in acute distress.     Appearance: She is well-developed. She is not ill-appearing, toxic-appearing or diaphoretic.   HENT:      Head: Normocephalic and atraumatic.      Jaw: There is normal jaw occlusion. No trismus, tenderness, swelling, pain on movement or malocclusion.      Right Ear: Hearing, tympanic membrane, ear canal and external ear normal.      Left Ear: Hearing, tympanic membrane, ear canal and external ear normal.      Nose: Nose normal. No mucosal edema or rhinorrhea.      Right Sinus: No maxillary sinus tenderness or frontal " sinus tenderness.      Left Sinus: No maxillary sinus tenderness or frontal sinus tenderness.      Mouth/Throat:      Lips: Pink. No lesions.      Mouth: Mucous membranes are moist. No oral lesions.      Dentition: Abnormal dentition. Dental tenderness and dental caries present. No dental abscesses or gum lesions.      Pharynx: Uvula midline. No oropharyngeal exudate, posterior oropharyngeal erythema or uvula swelling.      Tonsils: No tonsillar abscesses.      Comments: There are multiple dental caries over the teeth on the R upper jaw. She has mild gingival swelling and redness. No Abscess. No drooling. No swelling at the base of the tongue.   Cardiovascular:      Rate and Rhythm: Normal rate and regular rhythm.      Heart sounds: S1 normal and S2 normal. Heart sounds not distant. No murmur heard.     No friction rub. No gallop. No S3 or S4 sounds.   Pulmonary:      Effort: No tachypnea, bradypnea, accessory muscle usage or respiratory distress.      Breath sounds: No decreased breath sounds, wheezing, rhonchi or rales.   Musculoskeletal:      Cervical back: Normal range of motion and neck supple.   Lymphadenopathy:      Cervical: No cervical adenopathy.   Neurological:      Mental Status: She is alert and oriented to person, place, and time.   Psychiatric:         Behavior: Behavior normal.

## 2024-03-09 NOTE — PATIENT INSTRUCTIONS
Dental infection:   - Will prescribe Clindamycin to be taken as prescribed. Take with food and a probiotic. We discussed risk for diarrhea and precautions.   -Stay well hydrated and rest   -You can do warm salt water gargles.   -Warm compress on the facial area for comfort   -Eat soft foods  -Advil or Tylenol for pain   -Dental follow up in the next 48 hours. If your sx worsen go immediately to the ED.

## 2024-08-12 ENCOUNTER — OFFICE VISIT (OUTPATIENT)
Dept: FAMILY MEDICINE CLINIC | Facility: CLINIC | Age: 59
End: 2024-08-12

## 2024-08-12 VITALS
BODY MASS INDEX: 41.48 KG/M2 | OXYGEN SATURATION: 98 % | HEART RATE: 74 BPM | WEIGHT: 225.4 LBS | SYSTOLIC BLOOD PRESSURE: 126 MMHG | TEMPERATURE: 97.3 F | HEIGHT: 62 IN | DIASTOLIC BLOOD PRESSURE: 84 MMHG

## 2024-08-12 DIAGNOSIS — R42 VERTIGO: Primary | ICD-10-CM

## 2024-08-12 DIAGNOSIS — Z12.31 ENCOUNTER FOR SCREENING MAMMOGRAM FOR BREAST CANCER: ICD-10-CM

## 2024-08-12 PROCEDURE — 99214 OFFICE O/P EST MOD 30 MIN: CPT | Performed by: FAMILY MEDICINE

## 2024-08-12 RX ORDER — CLINDAMYCIN HCL 300 MG
300 CAPSULE ORAL
COMMUNITY
Start: 2024-06-26

## 2024-08-12 RX ORDER — MECLIZINE HYDROCHLORIDE 25 MG/1
25 TABLET ORAL EVERY 8 HOURS PRN
Qty: 30 TABLET | Refills: 0 | Status: SHIPPED | OUTPATIENT
Start: 2024-08-12

## 2024-08-12 RX ORDER — FLUTICASONE PROPIONATE 50 MCG
2 SPRAY, SUSPENSION (ML) NASAL DAILY
Qty: 16 G | Refills: 0 | Status: SHIPPED | OUTPATIENT
Start: 2024-08-12 | End: 2024-09-11

## 2024-08-12 RX ORDER — CHLORHEXIDINE GLUCONATE ORAL RINSE 1.2 MG/ML
15 SOLUTION DENTAL 2 TIMES DAILY
COMMUNITY
Start: 2024-06-26

## 2024-08-12 NOTE — PROGRESS NOTES
Assessment/Plan: No significant findings on exam.  Considering duration of intermittent symptoms recommend consideration for physical therapy and treatment with medication below.  Consider MRI if symptoms persist or worsen.  Card given for ENT specialist to consider their opinion on this as well.  Patient will call with any new persisting or worsening symptoms.  Recommend annual checkups to discuss screening.  Recommend annual mammography as well as continuing colon cancer screening     1. Vertigo  -     meclizine (ANTIVERT) 25 mg tablet; Take 1 tablet (25 mg total) by mouth every 8 (eight) hours as needed for dizziness  -     fluticasone (FLONASE) 50 mcg/act nasal spray; 2 sprays into each nostril daily  -     MRI brain wo contrast; Future; Expected date: 08/12/2024  -     Ambulatory Referral to Physical Therapy; Future  2. Encounter for screening mammogram for breast cancer  -     Mammo screening bilateral w 3d & cad; Future; Expected date: 08/12/2024        Subjective:      Patient ID: Jennifer Armas is a 58 y.o. female.    Patient is seen for dizzy nests intermittently for the last 2 months.  She notes when she lays on her right hand side she gets dizziness.  She has had this in the past but it has recurred over the last 2 months.  Denies any headaches or diplopia.    Dizziness             The following portions of the patient's history were reviewed and updated as appropriate: allergies, current medications, past family history, past medical history, past social history, past surgical history, and problem list.    Review of Systems   Constitutional: Negative.    HENT: Negative.     Eyes: Negative.    Respiratory: Negative.     Cardiovascular: Negative.    Gastrointestinal: Negative.    Endocrine: Negative.    Genitourinary: Negative.    Musculoskeletal: Negative.    Skin: Negative.    Allergic/Immunologic: Negative.    Neurological:  Positive for dizziness.   Hematological: Negative.   "  Psychiatric/Behavioral: Negative.           Objective:      /84   Pulse 74   Temp (!) 97.3 °F (36.3 °C) (Tympanic)   Ht 5' 2\" (1.575 m)   Wt 102 kg (225 lb 6.4 oz)   SpO2 98%   BMI 41.23 kg/m²          Physical Exam  Vitals reviewed.   Constitutional:       Appearance: She is well-developed.   HENT:      Head: Normocephalic and atraumatic.      Right Ear: External ear normal. Tympanic membrane is not erythematous or bulging.      Left Ear: External ear normal. Tympanic membrane is not erythematous or bulging.      Nose: Nose normal.      Mouth/Throat:      Mouth: No oral lesions.      Pharynx: No oropharyngeal exudate.   Eyes:      General: No scleral icterus.        Right eye: No discharge.         Left eye: No discharge.      Conjunctiva/sclera: Conjunctivae normal.   Neck:      Thyroid: No thyromegaly.   Cardiovascular:      Rate and Rhythm: Normal rate and regular rhythm.      Heart sounds: Normal heart sounds. No murmur heard.     No friction rub. No gallop.   Pulmonary:      Effort: Pulmonary effort is normal. No respiratory distress.      Breath sounds: No wheezing or rales.   Chest:      Chest wall: No tenderness.   Abdominal:      General: Bowel sounds are normal. There is no distension.      Palpations: Abdomen is soft. There is no mass.      Tenderness: There is no abdominal tenderness. There is no guarding or rebound.   Musculoskeletal:         General: No tenderness or deformity. Normal range of motion.      Cervical back: Normal range of motion and neck supple.   Lymphadenopathy:      Cervical: No cervical adenopathy.   Skin:     General: Skin is warm and dry.      Coloration: Skin is not pale.      Findings: No erythema or rash.   Neurological:      Mental Status: She is alert and oriented to person, place, and time.      Cranial Nerves: No cranial nerve deficit.      Motor: No abnormal muscle tone.      Coordination: Coordination normal.      Deep Tendon Reflexes: Reflexes are normal " and symmetric.   Psychiatric:         Behavior: Behavior normal.

## 2024-08-14 ENCOUNTER — TELEPHONE (OUTPATIENT)
Age: 59
End: 2024-08-14

## 2024-08-14 NOTE — TELEPHONE ENCOUNTER
Patient called in regards to her MRI of the brain. Patient stated that at her visit she discussed with provider about having problems with the left side of her throat and thought that the MRI would include that but it only includes the brain. Patient would like to know If she can also get an MRI done for the left side of her throat. Patient would like a call back.

## 2024-08-14 NOTE — TELEPHONE ENCOUNTER
No need for MRI of the throat at this time.  Recommend follow-up with ENT specialist if those symptoms are still persisting

## 2024-08-14 NOTE — TELEPHONE ENCOUNTER
Message was sent over to the office due to patient seeing Dr. Nevarez on 8/12 for issue and ordering patient an MRI.

## 2024-09-02 ENCOUNTER — APPOINTMENT (EMERGENCY)
Dept: RADIOLOGY | Facility: HOSPITAL | Age: 59
End: 2024-09-02
Payer: COMMERCIAL

## 2024-09-02 ENCOUNTER — HOSPITAL ENCOUNTER (EMERGENCY)
Facility: HOSPITAL | Age: 59
Discharge: HOME | End: 2024-09-02
Attending: EMERGENCY MEDICINE
Payer: COMMERCIAL

## 2024-09-02 VITALS
OXYGEN SATURATION: 98 % | SYSTOLIC BLOOD PRESSURE: 182 MMHG | HEART RATE: 74 BPM | TEMPERATURE: 98.5 F | RESPIRATION RATE: 18 BRPM | DIASTOLIC BLOOD PRESSURE: 103 MMHG

## 2024-09-02 DIAGNOSIS — R07.89 CHEST WALL PAIN: Primary | ICD-10-CM

## 2024-09-02 LAB
ALBUMIN SERPL BCG-MCNC: 4.1 G/DL (ref 3.5–5)
ALP SERPL-CCNC: 90 U/L (ref 34–104)
ALT SERPL W P-5'-P-CCNC: 11 U/L (ref 7–52)
ANION GAP SERPL CALCULATED.3IONS-SCNC: 6 MMOL/L (ref 4–13)
AST SERPL W P-5'-P-CCNC: 21 U/L (ref 13–39)
BASOPHILS # BLD AUTO: 0.07 THOUSANDS/ÂΜL (ref 0–0.1)
BASOPHILS NFR BLD AUTO: 1 % (ref 0–1)
BILIRUB SERPL-MCNC: 0.36 MG/DL (ref 0.2–1)
BUN SERPL-MCNC: 12 MG/DL (ref 5–25)
CALCIUM SERPL-MCNC: 9.1 MG/DL (ref 8.4–10.2)
CARDIAC TROPONIN I PNL SERPL HS: <2 NG/L
CHLORIDE SERPL-SCNC: 104 MMOL/L (ref 96–108)
CO2 SERPL-SCNC: 29 MMOL/L (ref 21–32)
CREAT SERPL-MCNC: 0.92 MG/DL (ref 0.6–1.3)
EOSINOPHIL # BLD AUTO: 0.16 THOUSAND/ÂΜL (ref 0–0.61)
EOSINOPHIL NFR BLD AUTO: 2 % (ref 0–6)
ERYTHROCYTE [DISTWIDTH] IN BLOOD BY AUTOMATED COUNT: 13.2 % (ref 11.6–15.1)
GFR SERPL CREATININE-BSD FRML MDRD: 69 ML/MIN/1.73SQ M
GLUCOSE SERPL-MCNC: 85 MG/DL (ref 65–140)
HCT VFR BLD AUTO: 42.9 % (ref 34.8–46.1)
HGB BLD-MCNC: 13.9 G/DL (ref 11.5–15.4)
IMM GRANULOCYTES # BLD AUTO: 0.02 THOUSAND/UL (ref 0–0.2)
IMM GRANULOCYTES NFR BLD AUTO: 0 % (ref 0–2)
LIPASE SERPL-CCNC: 49 U/L (ref 11–82)
LYMPHOCYTES # BLD AUTO: 5.2 THOUSANDS/ÂΜL (ref 0.6–4.47)
LYMPHOCYTES NFR BLD AUTO: 49 % (ref 14–44)
MCH RBC QN AUTO: 29.4 PG (ref 26.8–34.3)
MCHC RBC AUTO-ENTMCNC: 32.4 G/DL (ref 31.4–37.4)
MCV RBC AUTO: 91 FL (ref 82–98)
MONOCYTES # BLD AUTO: 0.66 THOUSAND/ÂΜL (ref 0.17–1.22)
MONOCYTES NFR BLD AUTO: 6 % (ref 4–12)
NEUTROPHILS # BLD AUTO: 4.42 THOUSANDS/ÂΜL (ref 1.85–7.62)
NEUTS SEG NFR BLD AUTO: 42 % (ref 43–75)
NRBC BLD AUTO-RTO: 0 /100 WBCS
PLATELET # BLD AUTO: 232 THOUSANDS/UL (ref 149–390)
PMV BLD AUTO: 10.2 FL (ref 8.9–12.7)
POTASSIUM SERPL-SCNC: 4.5 MMOL/L (ref 3.5–5.3)
PROT SERPL-MCNC: 7.6 G/DL (ref 6.4–8.4)
RBC # BLD AUTO: 4.73 MILLION/UL (ref 3.81–5.12)
SODIUM SERPL-SCNC: 139 MMOL/L (ref 135–147)
WBC # BLD AUTO: 10.53 THOUSAND/UL (ref 4.31–10.16)

## 2024-09-02 PROCEDURE — 85025 COMPLETE CBC W/AUTO DIFF WBC: CPT

## 2024-09-02 PROCEDURE — 36415 COLL VENOUS BLD VENIPUNCTURE: CPT

## 2024-09-02 PROCEDURE — 71045 X-RAY EXAM CHEST 1 VIEW: CPT

## 2024-09-02 PROCEDURE — 96374 THER/PROPH/DIAG INJ IV PUSH: CPT

## 2024-09-02 PROCEDURE — 99285 EMERGENCY DEPT VISIT HI MDM: CPT

## 2024-09-02 PROCEDURE — 83690 ASSAY OF LIPASE: CPT

## 2024-09-02 PROCEDURE — 93005 ELECTROCARDIOGRAM TRACING: CPT

## 2024-09-02 PROCEDURE — 80053 COMPREHEN METABOLIC PANEL: CPT

## 2024-09-02 PROCEDURE — 99285 EMERGENCY DEPT VISIT HI MDM: CPT | Performed by: EMERGENCY MEDICINE

## 2024-09-02 PROCEDURE — 84484 ASSAY OF TROPONIN QUANT: CPT

## 2024-09-02 RX ORDER — SENNOSIDES 8.6 MG
650 CAPSULE ORAL EVERY 8 HOURS PRN
Qty: 30 TABLET | Refills: 0 | Status: SHIPPED | OUTPATIENT
Start: 2024-09-02

## 2024-09-02 RX ORDER — FAMOTIDINE 10 MG/ML
20 INJECTION, SOLUTION INTRAVENOUS ONCE
Status: COMPLETED | OUTPATIENT
Start: 2024-09-02 | End: 2024-09-02

## 2024-09-02 RX ORDER — MAGNESIUM HYDROXIDE/ALUMINUM HYDROXICE/SIMETHICONE 120; 1200; 1200 MG/30ML; MG/30ML; MG/30ML
30 SUSPENSION ORAL ONCE
Status: COMPLETED | OUTPATIENT
Start: 2024-09-02 | End: 2024-09-02

## 2024-09-02 RX ORDER — FAMOTIDINE 20 MG/1
20 TABLET, FILM COATED ORAL 2 TIMES DAILY
Qty: 30 TABLET | Refills: 0 | Status: SHIPPED | OUTPATIENT
Start: 2024-09-02

## 2024-09-02 RX ADMIN — FAMOTIDINE 20 MG: 10 INJECTION INTRAVENOUS at 18:10

## 2024-09-02 RX ADMIN — ALUMINUM HYDROXIDE, MAGNESIUM HYDROXIDE, DIMETHICONE 30 ML: 200; 200; 20 LIQUID ORAL at 18:57

## 2024-09-02 NOTE — ED ATTENDING ATTESTATION
9/2/2024  IAlejandro DO, saw and evaluated the patient. I have discussed the patient with the resident/non-physician practitioner and agree with the resident's/non-physician practitioner's findings, Plan of Care, and MDM as documented in the resident's/non-physician practitioner's note, except where noted. All available labs and Radiology studies were reviewed.  I was present for key portions of any procedure(s) performed by the resident/non-physician practitioner and I was immediately available to provide assistance.       At this point I agree with the current assessment done in the Emergency Department.  I have conducted an independent evaluation of this patient a history and physical is as follows:    88-year-old female coming to the ED for evaluation of left-sided chest pain, describes as cramping.  Pain started this morning.  She denies any associated symptoms.  She does admit she has been doing some heavy lifting recently, lifting her walker.  She injured her back last week and went to massage therapist which did help.    PE:  The patient is well appearing, non-toxic, in NAD. Head: normocephalic, atraumatic. HEENT: mucous membranes moist.  Lungs: CTA b/l, no resp distress. Heart: + left chest wall tenderness. RRR. No M/R/G. Abdomen: NT, ND, no R/R/G. Neuro: CN2-12 intact, GCS 15. Normal strength and sensation, normal speech and gait. Cap refill < 2 sec, skin warm and dry. No rashes or lesions.    57 yo w/ CP - + chest wall tenderness. Symptoms > 3 hours, troponin < 2.  Most likely MSK chest wall strain - doubt cardiac or pulm etiology, may be GI.  Stable for d/c home, NSAIDs PRN.      ED Course         Critical Care Time  Procedures

## 2024-09-02 NOTE — ED PROVIDER NOTES
History  Chief Complaint   Patient presents with    Chest Pain     Pt presents to the ED with mid sternal cp onset this morning.      ALFREDO Armas is a 58 y.o. female with history of dyspepsia, duodenal ulcer, hysterectomy presenting for chest pain.    Patient reports this morning she was eating when she felt substernal non-radiation, non-exertional chest pain. Does report that she had been lifting things that she usually doesn't prior to onset of pain, no trauma. No associated symptoms including palpitations, shortness of breath, nausea, vomiting, abdominal pain, back pain, diaphoresis, lightheadedness, dizziness, new numbness, weakness or pain. Patient reports she's had discomfort similar to this in the past, usually around eating, but today it has persisted which concerned her. No medications taken prior to presentation. No recent surgery, immobility, cancer or treatment, history of VTE, hemoptysis or cardiac history.    Prior to Admission Medications   Prescriptions Last Dose Informant Patient Reported? Taking?   Multiple Vitamin (multivitamin) capsule  Self Yes No   Sig: Take 1 capsule by mouth daily Taking liquid   Patient not taking: Reported on 2024   Probiotic Product (PROBIOTIC ADVANCED PO)   Yes No   Sig: Take by mouth daily As needed   albuterol (PROVENTIL HFA,VENTOLIN HFA) 90 mcg/act inhaler   No No   Sig: Inhale 2 puffs every 6 (six) hours as needed for wheezing or shortness of breath (swish/spit after use)   Patient not taking: Reported on 3/15/2023   chlorhexidine (PERIDEX) 0.12 % solution   Yes No   Sig: Apply 15 mL to the mouth or throat 2 (two) times a day   Patient not taking: Reported on 2024   clindamycin (CLEOCIN) 300 MG capsule   Yes No   Sig: Take 300 mg by mouth   Patient not taking: Reported on 2024   fluticasone (FLONASE) 50 mcg/act nasal spray   No No   Si sprays into each nostril daily   meclizine (ANTIVERT) 25 mg tablet   No No   Sig: Take 1 tablet (25 mg  "total) by mouth every 8 (eight) hours as needed for dizziness   methylPREDNISolone 4 MG tablet therapy pack   No No   Sig: Use as directed on package   Patient not taking: Reported on 2024   traMADol-acetaminophen (ULTRACET) 37.5-325 mg per tablet   Yes No   Sig: Take 1 tablet by mouth   Patient not taking: Reported on 2024      Facility-Administered Medications: None       Past Medical History:   Diagnosis Date    Constipation     on occ    Dizziness     inner ear    Headache     History of duodenal ulcer     History of iron deficiency anemia     History of palpitations     History of UTI     with hematuria    Hypertension     Neck pain     Obesity     Refusal of blood transfusions as patient is Mu-ism     Wears glasses     will wear occ for reading       Past Surgical History:   Procedure Laterality Date    BARTHOLIN GLAND CYST EXCISION       SECTION      ,    COLONOSCOPY      - \"no polpyps\"    CYSTOSCOPY  2015    diagnostic / Managed by Grant Mora / dawson 7/29/15     DILATION AND CURETTAGE OF UTERUS      HYSTERECTOMY      partial-ovaries remain       Family History   Problem Relation Age of Onset    Colon cancer Mother     Hypertension Mother     Hypothyroidism Mother     Cancer Mother         colon w/mets    Prostate cancer Father     Cancer Father         prostate    Diabetes Sister         3 sisters    Breast cancer additional onset Cousin     Heart disease Neg Hx     Stroke Neg Hx      I have reviewed and agree with the history as documented.    E-Cigarette/Vaping    E-Cigarette Use Never User      E-Cigarette/Vaping Substances    Nicotine No     THC No     CBD No     Flavoring No     Other No     Unknown No      Social History     Tobacco Use    Smoking status: Former     Current packs/day: 0.00     Average packs/day: 0.5 packs/day for 8.0 years (4.0 ttl pk-yrs)     Types: Cigarettes     Start date: 1987     Quit date: 1995     Years since " quittin.6    Smokeless tobacco: Never   Vaping Use    Vaping status: Never Used   Substance Use Topics    Alcohol use: Yes     Comment: few a month    Drug use: Never        Review of Systems   Constitutional:  Negative for chills and fever.   HENT:  Negative for congestion, hearing loss and trouble swallowing.    Eyes:  Negative for pain and visual disturbance.   Respiratory:  Negative for cough and shortness of breath.    Cardiovascular:  Positive for chest pain. Negative for palpitations and leg swelling.   Gastrointestinal:  Negative for abdominal pain, constipation, diarrhea, nausea and vomiting.   Genitourinary:  Negative for dysuria, frequency and hematuria.   Musculoskeletal:  Negative for arthralgias and back pain.   Skin:  Negative for color change and rash.   Neurological:  Negative for dizziness, seizures, syncope, weakness, light-headedness and headaches.   Psychiatric/Behavioral:  Negative for dysphoric mood.    All other systems reviewed and are negative.      Physical Exam  ED Triage Vitals [24 1723]   Temperature Pulse Respirations Blood Pressure SpO2   98.5 °F (36.9 °C) 71 16 (!) 194/105 97 %      Temp Source Heart Rate Source Patient Position - Orthostatic VS BP Location FiO2 (%)   Oral Monitor Sitting Right arm --      Pain Score       --             Orthostatic Vital Signs  Vitals:    24 1723 24 1914   BP: (!) 194/105 (!) 182/103   Pulse: 71 74   Patient Position - Orthostatic VS: Sitting        Physical Exam  Vitals and nursing note reviewed.   Constitutional:       General: She is not in acute distress.     Appearance: She is well-developed.   HENT:      Head: Normocephalic and atraumatic.      Mouth/Throat:      Mouth: Mucous membranes are moist.      Pharynx: Oropharynx is clear.   Eyes:      Extraocular Movements: Extraocular movements intact.      Conjunctiva/sclera: Conjunctivae normal.      Pupils: Pupils are equal, round, and reactive to light.   Cardiovascular:       Rate and Rhythm: Normal rate and regular rhythm.      Pulses: Normal pulses.      Heart sounds: Normal heart sounds.   Pulmonary:      Effort: Pulmonary effort is normal. No respiratory distress.      Breath sounds: Normal breath sounds. No wheezing, rhonchi or rales.   Chest:      Chest wall: Tenderness present. No mass, deformity, swelling or crepitus.   Abdominal:      General: Abdomen is flat. There is no distension.      Palpations: Abdomen is soft.      Tenderness: There is no abdominal tenderness. There is no right CVA tenderness, left CVA tenderness, guarding or rebound.   Musculoskeletal:      Cervical back: Normal range of motion.      Right lower leg: No edema.      Left lower leg: No edema.   Skin:     General: Skin is warm and dry.      Capillary Refill: Capillary refill takes less than 2 seconds.   Neurological:      General: No focal deficit present.      Mental Status: She is alert and oriented to person, place, and time.      Sensory: No sensory deficit.      Motor: No weakness.   Psychiatric:         Mood and Affect: Mood normal.         Behavior: Behavior normal.         ED Medications  Medications   Famotidine (PF) (PEPCID) injection 20 mg (20 mg Intravenous Given 9/2/24 1810)   aluminum-magnesium hydroxide-simethicone (MAALOX) oral suspension 30 mL (30 mL Oral Given 9/2/24 1857)       Diagnostic Studies  Results Reviewed       Procedure Component Value Units Date/Time    Lipase [[id number]]  (Normal) Collected: 09/02/24 1810    Lab Status: Final result Specimen: Blood from Arm, Right Updated: 09/02/24 1905     Lipase 49 u/L     HS Troponin 0hr (reflex protocol) [[id number]]  (Normal) Collected: 09/02/24 1810    Lab Status: Final result Specimen: Blood from Arm, Right Updated: 09/02/24 1842     hs TnI 0hr <2 ng/L     Comprehensive metabolic panel [[id number]] Collected: 09/02/24 1810    Lab Status: Final result Specimen: Blood from Arm, Right Updated: 09/02/24 1836     Sodium 139 mmol/L       Potassium 4.5 mmol/L      Chloride 104 mmol/L      CO2 29 mmol/L      ANION GAP 6 mmol/L      BUN 12 mg/dL      Creatinine 0.92 mg/dL      Glucose 85 mg/dL      Calcium 9.1 mg/dL      AST 21 U/L      ALT 11 U/L      Alkaline Phosphatase 90 U/L      Total Protein 7.6 g/dL      Albumin 4.1 g/dL      Total Bilirubin 0.36 mg/dL      eGFR 69 ml/min/1.73sq m     Narrative:      National Kidney Disease Foundation guidelines for Chronic Kidney Disease (CKD):     Stage 1 with normal or high GFR (GFR > 90 mL/min/1.73 square meters)    Stage 2 Mild CKD (GFR = 60-89 mL/min/1.73 square meters)    Stage 3A Moderate CKD (GFR = 45-59 mL/min/1.73 square meters)    Stage 3B Moderate CKD (GFR = 30-44 mL/min/1.73 square meters)    Stage 4 Severe CKD (GFR = 15-29 mL/min/1.73 square meters)    Stage 5 End Stage CKD (GFR <15 mL/min/1.73 square meters)  Note: GFR calculation is accurate only with a steady state creatinine    CBC and differential [[id number]]  (Abnormal) Collected: 09/02/24 1810    Lab Status: Final result Specimen: Blood from Arm, Right Updated: 09/02/24 1821     WBC 10.53 Thousand/uL      RBC 4.73 Million/uL      Hemoglobin 13.9 g/dL      Hematocrit 42.9 %      MCV 91 fL      MCH 29.4 pg      MCHC 32.4 g/dL      RDW 13.2 %      MPV 10.2 fL      Platelets 232 Thousands/uL      nRBC 0 /100 WBCs      Segmented % 42 %      Immature Grans % 0 %      Lymphocytes % 49 %      Monocytes % 6 %      Eosinophils Relative 2 %      Basophils Relative 1 %      Absolute Neutrophils 4.42 Thousands/µL      Absolute Immature Grans 0.02 Thousand/uL      Absolute Lymphocytes 5.20 Thousands/µL      Absolute Monocytes 0.66 Thousand/µL      Eosinophils Absolute 0.16 Thousand/µL      Basophils Absolute 0.07 Thousands/µL                    XR chest 1 view portable   ED Interpretation by Irene Núñez MD (09/02 1928)   Per my interpretation: No acute cardiopulmonary disease            Procedures  ECG 12 Lead Documentation Only    Date/Time:  9/2/2024 5:32 PM    Performed by: Irene Núñez MD  Authorized by: Irene Núñez MD    Indications / Diagnosis:  Chest pain  ECG reviewed by me, the ED Provider: yes    Patient location:  ED  Previous ECG:     Previous ECG:  Compared to current    Comparison ECG info:  8/30/2021    Similarity:  No change    Comparison to cardiac monitor: Yes    Interpretation:     Interpretation: normal    Rate:     ECG rate:  70    ECG rate assessment: normal    Rhythm:     Rhythm: sinus rhythm    Ectopy:     Ectopy: none    QRS:     QRS axis:  Normal    QRS intervals:  Normal  Conduction:     Conduction: normal    ST segments:     ST segments:  Normal  T waves:     T waves: normal    Comments:      QTc 421        ED Course  ED Course as of 09/03/24 0235   Mon Sep 02, 2024   1831 CBC and differential(!)  Grossly normal   1837 Comprehensive metabolic panel  Normal   1848 hs TnI 0hr: <2  Normal, as symptoms onset started this morning, unlikely to be ACS at this time.   1923 LIPASE: 49  Normal             HEART Risk Score      Flowsheet Row Most Recent Value   Heart Score Risk Calculator    History 0 Filed at: 09/02/2024 1910   ECG 0 Filed at: 09/02/2024 1910   Age 1 Filed at: 09/02/2024 1910   Risk Factors 1 Filed at: 09/02/2024 1910   Troponin 0 Filed at: 09/02/2024 1910   HEART Score 2 Filed at: 09/02/2024 1910                                  Medical Decision Making  Jennifer Armas is a 58 y.o. female presenting with chest pain. Vitals remarkable for elevated blood pressure. Exam remarkable for tenderness to palpation over sternum and slight left chest wall.      DDX including but not limited to: ACS, MI, PTX, pneumonia, dissection, pleurisy, pericarditis, myocarditis, rhabdomyolysis, GI etiology.      Plan:  - Will get ECG to evaluate for arrhythmia and signs of ischemia and other cause of symptoms  - Will examine CBC to evaluate for hematologic abnormalities and infection  - Will examine CMP to evaluate for  metabolic abnormalities  - Will examine lipase for pancreatitis  - Will examine Troponins  - Will get CXR to evaluate for cardiopulmonary causes  - Will give pepcid and maalox for pain, will consider other medications as well    Will continue to monitor while patient is in the ED and reconsider further evaluation or intervention as needed.      See ED course for further updates and interpretation of results.    Based on these results and H&P, most suspect MSK vs GI etiology. At this time, no indication for emergent intervention. Patient has remained hemodynamically stable and symptoms have improved during stay in the ED. Patient is stable for discharge. Discharged with prescription for tylenol and pepcid. Given contact information for cardiology.    Results, clinical impressions, and plan were discussed with patient. They expressed understanding and were in agreement with plan. Patient was given the opportunity to ask questions in ED. All questions and concerns were addressed in ED.    After evaluation and workup in the emergency department Patient appears well, is nontoxic appearing, expresses understanding and agrees with plan of care at this time.  In light of this patient would benefit from outpatient management. Discussed strict return precautions.  Discussed importance of following up with PCP in the next few days.  All questions answered.  Patient is agreeable to discharge.    Amount and/or Complexity of Data Reviewed  External Data Reviewed: labs, radiology, ECG and notes.  Labs: ordered. Decision-making details documented in ED Course.  Radiology: ordered and independent interpretation performed.  ECG/medicine tests: ordered and independent interpretation performed.    Risk  OTC drugs.  Prescription drug management.          Disposition  Final diagnoses:   Chest wall pain     Time reflects when diagnosis was documented in both MDM as applicable and the Disposition within this note       Time User Action  Codes Description Comment    9/2/2024  7:11 PM AnnAlejandro godfrey Add [R07.89] Chest wall pain           ED Disposition       ED Disposition   Discharge    Condition   Stable    Date/Time   Mon Sep 2, 2024 1911    Comment   Jennifer Pawel discharge to home/self care.                   Follow-up Information       Follow up With Specialties Details Why Contact Info Additional Information    Abdon Nevarez,  Family Medicine Go to  Re-evaluation of symptoms [address]  [facility] [address] [phone]       [facility] Emergency Medicine Go to  As needed, If symptoms worsen [address] [facility] [address]  [phone] [facility], [address], [place], [address]    [facility] Go to  Re-evaluation of symptoms [address]  [facility] [address]  [phone] [facility] [address], [address], [place], [place], [address]            Discharge Medication List as of 9/2/2024  7:28 PM        START taking these medications    Details   acetaminophen (TYLENOL) 650 mg CR tablet Take 1 tablet (650 mg total) by mouth every 8 (eight) hours as needed for mild pain, Starting Mon 9/2/2024, Normal      famotidine (PEPCID) 20 mg tablet Take 1 tablet (20 mg total) by mouth 2 (two) times a day, Starting Mon 9/2/2024, Normal           CONTINUE these medications which have NOT CHANGED    Details   albuterol (PROVENTIL HFA,VENTOLIN HFA) 90 mcg/act inhaler Inhale 2 puffs every 6 (six) hours as needed for wheezing or shortness of breath (swish/spit after use), Starting Wed 2/15/2023, Normal      chlorhexidine (PERIDEX) 0.12 % solution Apply 15 mL to the mouth or throat 2 (two) times a day, Starting Wed 6/26/2024, Historical Med      clindamycin (CLEOCIN) 300 MG capsule Take 300 mg by mouth, Starting Wed 6/26/2024, Historical Med      fluticasone  (FLONASE) 50 mcg/act nasal spray 2 sprays into each nostril daily, Starting Mon 8/12/2024, Until Wed 9/11/2024, Normal      meclizine (ANTIVERT) 25 mg tablet Take 1 tablet (25 mg total) by mouth every 8 (eight) hours as needed for dizziness, Starting Mon 8/12/2024, Normal      methylPREDNISolone 4 MG tablet therapy pack Use as directed on package, Normal      Multiple Vitamin (multivitamin) capsule Take 1 capsule by mouth daily Taking liquid, Historical Med      Probiotic Product (PROBIOTIC ADVANCED PO) Take by mouth daily As needed, Historical Med      traMADol-acetaminophen (ULTRACET) 37.5-325 mg per tablet Take 1 tablet by mouth, Starting u 6/27/2024, Historical Med           No discharge procedures on file.    PDMP Review       None             ED Provider  Attending physically available and evaluated Jennifer Armas. I managed the patient along with the ED Attending.    Electronically Signed by           Irene Núñez MD  09/03/24 9806

## 2024-09-02 NOTE — DISCHARGE INSTRUCTIONS
You were evaluated in the emergency department for: chest pain. You can access your current and pending results through [facility] [employer]. A radiologist will take a second look at your X-Rays, if you had any, and you will be contacted with any new findings.     - You should follow-up with your primary care provider, as soon as possible, for re-evaluation.    Please, return to the emergency department if you experience new or worsening symptoms, fever, chest pain, shortness of breath, difficulty breathing, dizziness, abdominal pain, persistent nausea/vomiting, syncope or passing out, blood in your urine or stool, coughing up blood, leg swelling/pain, urinary retention, bowel or bladder incontinence, numbness between your legs.

## 2024-09-03 LAB
ATRIAL RATE: 70 BPM
P AXIS: 43 DEGREES
PR INTERVAL: 174 MS
QRS AXIS: 24 DEGREES
QRSD INTERVAL: 78 MS
QT INTERVAL: 390 MS
QTC INTERVAL: 421 MS
T WAVE AXIS: 30 DEGREES
VENTRICULAR RATE: 70 BPM

## 2024-09-03 PROCEDURE — 93010 ELECTROCARDIOGRAM REPORT: CPT | Performed by: INTERNAL MEDICINE

## 2024-09-30 ENCOUNTER — OFFICE VISIT (OUTPATIENT)
Dept: FAMILY MEDICINE CLINIC | Facility: CLINIC | Age: 59
End: 2024-09-30

## 2024-09-30 VITALS
HEIGHT: 62 IN | WEIGHT: 230 LBS | BODY MASS INDEX: 42.33 KG/M2 | DIASTOLIC BLOOD PRESSURE: 80 MMHG | OXYGEN SATURATION: 97 % | HEART RATE: 94 BPM | TEMPERATURE: 97.4 F | SYSTOLIC BLOOD PRESSURE: 138 MMHG

## 2024-09-30 DIAGNOSIS — R42 VERTIGO: ICD-10-CM

## 2024-09-30 DIAGNOSIS — M54.2 NECK PAIN: ICD-10-CM

## 2024-09-30 DIAGNOSIS — I10 ESSENTIAL HYPERTENSION: Primary | ICD-10-CM

## 2024-09-30 PROCEDURE — 99214 OFFICE O/P EST MOD 30 MIN: CPT | Performed by: FAMILY MEDICINE

## 2024-09-30 RX ORDER — AMLODIPINE BESYLATE 5 MG/1
5 TABLET ORAL DAILY
Qty: 100 TABLET | Refills: 3 | Status: SHIPPED | OUTPATIENT
Start: 2024-09-30

## 2024-09-30 NOTE — PROGRESS NOTES
"Assessment/Plan: No significant findings on physical exam.  Recommend follow-up with ENT specialist.  Card given.  Consider physical therapy for neck pain and vertigo.  She does have hypertension and I recommended amlodipine 5 mg daily.  Recheck again in 2 to 4 weeks.     1. Essential hypertension  -     amLODIPine (NORVASC) 5 mg tablet; Take 1 tablet (5 mg total) by mouth daily  2. Neck pain  -     CT neck and chest w contrast; Future; Expected date: 09/30/2024  3. Vertigo  -     Ambulatory Referral to Physical Therapy; Future        Subjective:      Patient ID: Jennifer Armas is a 59 y.o. female.    Patient is seen today for nonspecific vertigo which continues.  She has not seen ear nose and throat specialist for this.  She did have MRI that was negative.  She does have chronic throat pain and ear pain on the right-hand side.  She is not a current smoker but is smoking history.  He has occasional neck pain to the right trapezius area.    Dizziness  Associated symptoms include neck pain.   Neck Pain              The following portions of the patient's history were reviewed and updated as appropriate: allergies, current medications, past family history, past medical history, past social history, past surgical history, and problem list.    Review of Systems   Constitutional: Negative.    HENT: Negative.     Eyes: Negative.    Respiratory: Negative.     Cardiovascular: Negative.    Gastrointestinal: Negative.    Endocrine: Negative.    Genitourinary: Negative.    Musculoskeletal:  Positive for neck pain.   Skin: Negative.    Allergic/Immunologic: Negative.    Neurological:  Positive for dizziness.   Hematological: Negative.    Psychiatric/Behavioral: Negative.           Objective:      /80 (BP Location: Left arm, Patient Position: Standing, Cuff Size: Large)   Pulse 94   Temp (!) 97.4 °F (36.3 °C) (Tympanic)   Ht 5' 2\" (1.575 m)   Wt 104 kg (230 lb)   SpO2 97%   BMI 42.07 kg/m²          Physical " Exam  Vitals reviewed.   Constitutional:       Appearance: She is well-developed.   HENT:      Head: Normocephalic and atraumatic.      Right Ear: External ear normal. Tympanic membrane is not erythematous or bulging.      Left Ear: External ear normal. Tympanic membrane is not erythematous or bulging.      Nose: Nose normal.      Mouth/Throat:      Mouth: No oral lesions.      Pharynx: No oropharyngeal exudate.   Eyes:      General: No scleral icterus.        Right eye: No discharge.         Left eye: No discharge.      Conjunctiva/sclera: Conjunctivae normal.   Neck:      Thyroid: No thyromegaly.   Cardiovascular:      Rate and Rhythm: Normal rate and regular rhythm.      Heart sounds: Normal heart sounds. No murmur heard.     No friction rub. No gallop.   Pulmonary:      Effort: Pulmonary effort is normal. No respiratory distress.      Breath sounds: No wheezing or rales.   Chest:      Chest wall: No tenderness.   Abdominal:      General: Bowel sounds are normal. There is no distension.      Palpations: Abdomen is soft. There is no mass.      Tenderness: There is no abdominal tenderness. There is no guarding or rebound.   Musculoskeletal:         General: No tenderness or deformity. Normal range of motion.      Cervical back: Normal range of motion and neck supple.   Lymphadenopathy:      Cervical: No cervical adenopathy.   Skin:     General: Skin is warm and dry.      Coloration: Skin is not pale.      Findings: No erythema or rash.   Neurological:      Mental Status: She is alert and oriented to person, place, and time.      Cranial Nerves: No cranial nerve deficit.      Motor: No abnormal muscle tone.      Coordination: Coordination normal.      Deep Tendon Reflexes: Reflexes are normal and symmetric.   Psychiatric:         Behavior: Behavior normal.

## 2024-10-07 ENCOUNTER — TELEPHONE (OUTPATIENT)
Age: 59
End: 2024-10-07

## 2024-10-07 NOTE — TELEPHONE ENCOUNTER
Pt called stated at the last appt she was given a phone number to see if she could get any financial help since she doesn't have insurance.    Gave the pt [phone] option 2

## 2024-11-24 ENCOUNTER — OFFICE VISIT (OUTPATIENT)
Dept: URGENT CARE | Facility: CLINIC | Age: 59
End: 2024-11-24
Payer: COMMERCIAL

## 2024-11-24 VITALS
HEART RATE: 91 BPM | WEIGHT: 230 LBS | BODY MASS INDEX: 42.33 KG/M2 | DIASTOLIC BLOOD PRESSURE: 83 MMHG | RESPIRATION RATE: 16 BRPM | SYSTOLIC BLOOD PRESSURE: 125 MMHG | OXYGEN SATURATION: 96 % | HEIGHT: 62 IN | TEMPERATURE: 96.9 F

## 2024-11-24 DIAGNOSIS — M25.512 ACUTE PAIN OF LEFT SHOULDER: Primary | ICD-10-CM

## 2024-11-24 PROCEDURE — G0382 LEV 3 HOSP TYPE B ED VISIT: HCPCS | Performed by: NURSE PRACTITIONER

## 2024-11-24 RX ORDER — LIDOCAINE 50 MG/G
1 PATCH TOPICAL DAILY
Qty: 9 PATCH | Refills: 0 | Status: SHIPPED | OUTPATIENT
Start: 2024-11-24

## 2024-11-24 RX ORDER — METHYLPREDNISOLONE 4 MG/1
TABLET ORAL
Qty: 1 EACH | Refills: 0 | Status: SHIPPED | OUTPATIENT
Start: 2024-11-24

## 2024-11-24 NOTE — PROGRESS NOTES
[facility]        NAME: Jenniefr Armas is a 59 y.o. female  : 1965    MRN: [id number]  DATE: 2024  TIME: 1:22 PM    Assessment and Plan   Acute pain of left shoulder [M25.512]  1. Acute pain of left shoulder  methylPREDNISolone 4 MG tablet therapy pack    lidocaine (Lidoderm) 5 %            Patient Instructions       Follow up with PCP in 3-5 days.  Proceed to  ER if symptoms worsen.    If tests have been performed at [facility], our office will contact you with results if changes need to be made to the care plan discussed with you at the visit.  You can review your full results on [facility].      You have declined to have an xray to evaluate left shoulder pain  You are willing to try a low dose prednisone.  You MUST take it with food. You have been prescribed prednisone. Take with food. Do NOT take any NSAID products (motrin, ibuprofen, aleve, advil) while taking this medication.  You may take tylenol.     You are to use lidoderm patches 12 hours on and 12 hours off  You may try motrin cream on the shoulder    Follow up with your PCP in 3-5 days  Go to the ED if symptoms worsen   See orthopedics if symptoms continue     [facility] does not provide narcotic prescriptions for non severe injuries/pain - speak with your PCP       Chief Complaint     Chief Complaint   Patient presents with    Shoulder Pain     Left shoulder pain for a couple of days. Says she was lifting weights and injured her shoulder.         History of Present Illness       This is a 59 year old female who states about 4 days ago noted she had some left shoulder pain. She states that yesterday the pain became worse.  She denies any injury, history of or surgeries.  She states that she had a friend give her a lidoderm patch.  She states that she had some narcotic prescription tabs left and took them both this morning.  She states that she hasn't taken any NSAIDS as she has had stomach ulcers and was  "told not to take them - she denies hx of bleeding.  She states that he probably needs \"strong tylenol\" for pain.    PMH is listed and reviewed.  Pt denies xray due to self pay.     Shoulder Pain         Review of Systems   Review of Systems   Constitutional: Negative.    HENT: Negative.     Eyes: Negative.    Respiratory: Negative.     Cardiovascular: Negative.    Gastrointestinal: Negative.    Endocrine: Negative.    Genitourinary: Negative.    Musculoskeletal:         Left shoulder pain     Skin: Negative.    Allergic/Immunologic: Negative.    Neurological: Negative.    Hematological: Negative.    Psychiatric/Behavioral: Negative.           Current Medications       Current Outpatient Medications:     acetaminophen (TYLENOL) 650 mg CR tablet, Take 1 tablet (650 mg total) by mouth every 8 (eight) hours as needed for mild pain, Disp: 30 tablet, Rfl: 0    lidocaine (Lidoderm) 5 %, Apply 1 patch topically over 12 hours daily Remove & Discard patch within 12 hours or as directed by MD, Disp: 9 patch, Rfl: 0    methylPREDNISolone 4 MG tablet therapy pack, Use as directed on package, Disp: 1 each, Rfl: 0    Probiotic Product (PROBIOTIC ADVANCED PO), Take by mouth daily As needed, Disp: , Rfl:     albuterol (PROVENTIL HFA,VENTOLIN HFA) 90 mcg/act inhaler, Inhale 2 puffs every 6 (six) hours as needed for wheezing or shortness of breath (swish/spit after use) (Patient not taking: Reported on 11/24/2024), Disp: 18 g, Rfl: 0    amLODIPine (NORVASC) 5 mg tablet, Take 1 tablet (5 mg total) by mouth daily (Patient not taking: Reported on 11/24/2024), Disp: 100 tablet, Rfl: 3    chlorhexidine (PERIDEX) 0.12 % solution, Apply 15 mL to the mouth or throat 2 (two) times a day (Patient not taking: Reported on 11/24/2024), Disp: , Rfl:     clindamycin (CLEOCIN) 300 MG capsule, Take 300 mg by mouth (Patient not taking: Reported on 11/24/2024), Disp: , Rfl:     famotidine (PEPCID) 20 mg tablet, Take 1 tablet (20 mg total) by mouth 2 " "(two) times a day (Patient not taking: Reported on 2024), Disp: 30 tablet, Rfl: 0    fluticasone (FLONASE) 50 mcg/act nasal spray, 2 sprays into each nostril daily, Disp: 16 g, Rfl: 0    meclizine (ANTIVERT) 25 mg tablet, Take 1 tablet (25 mg total) by mouth every 8 (eight) hours as needed for dizziness (Patient not taking: Reported on 2024), Disp: 30 tablet, Rfl: 0    methylPREDNISolone 4 MG tablet therapy pack, Use as directed on package (Patient not taking: Reported on 2024), Disp: 21 each, Rfl: 0    Multiple Vitamin (multivitamin) capsule, Take 1 capsule by mouth daily Taking liquid (Patient not taking: Reported on 2024), Disp: , Rfl:     traMADol-acetaminophen (ULTRACET) 37.5-325 mg per tablet, Take 1 tablet by mouth (Patient not taking: Reported on 2024), Disp: , Rfl:     Current Allergies     Allergies as of 2024 - Reviewed 2024   Allergen Reaction Noted    Amoxicillin Anaphylaxis 2015    Other Hives 2020            The following portions of the patient's history were reviewed and updated as appropriate: allergies, current medications, past family history, past medical history, past social history, past surgical history and problem list.     Past Medical History:   Diagnosis Date    Constipation     on occ    Dizziness     inner ear    Headache     History of duodenal ulcer     History of iron deficiency anemia     History of palpitations     History of UTI     with hematuria    Hypertension     Neck pain     Obesity     Refusal of blood transfusions as patient is [employer]     Wears glasses     will wear occ for reading       Past Surgical History:   Procedure Laterality Date    BARTHOLIN GLAND CYST EXCISION       SECTION      ,    COLONOSCOPY      - \"no polpyps\"    CYSTOSCOPY  2015    diagnostic / Managed by Grant Mora / dawson 7/29/15     DENTAL IMPLANT Bilateral 2024    DILATION AND CURETTAGE OF UTERUS      " "HYSTERECTOMY      partial-ovaries remain       Family History   Problem Relation Age of Onset    Colon cancer Mother     Hypertension Mother     Hypothyroidism Mother     Cancer Mother         colon w/mets    Prostate cancer Father     Cancer Father         prostate    Diabetes Sister         3 sisters    Breast cancer additional onset Cousin     Heart disease Neg Hx     Stroke Neg Hx          Medications have been verified.        Objective   /83 (BP Location: Right arm, Patient Position: Sitting, Cuff Size: Standard)   Pulse 91   Temp (!) 96.9 °F (36.1 °C) (Temporal)   Resp 16   Ht 5' 2\" (1.575 m)   Wt 104 kg (230 lb)   SpO2 96%   BMI 42.07 kg/m²   No LMP recorded. Patient has had a hysterectomy.       Physical Exam     Physical Exam  Vitals and nursing note reviewed.   Constitutional:       General: She is not in acute distress.     Appearance: Normal appearance. She is obese. She is not ill-appearing, toxic-appearing or diaphoretic.   HENT:      Head: Normocephalic and atraumatic.   Eyes:      Extraocular Movements: Extraocular movements intact.   Cardiovascular:      Rate and Rhythm: Normal rate.      Pulses: Normal pulses.   Pulmonary:      Effort: Pulmonary effort is normal.   Musculoskeletal:         General: Tenderness present. No swelling, deformity or signs of injury.      Cervical back: Normal range of motion.      Comments: Able to raise left hand behind head but is very slow and grimaces while doing so.  Neg drop sign.  TTP ac joint area.  NVI  Muscle strength B/L UE 5/5,  5/5.   Pt is seen moving the shoulder/arm when gathering up belongings    Skin:     General: Skin is warm and dry.      Capillary Refill: Capillary refill takes less than 2 seconds.   Neurological:      General: No focal deficit present.      Mental Status: She is alert and oriented to person, place, and time.   Psychiatric:         Mood and Affect: Mood normal.         Behavior: Behavior normal.         Thought " Content: Thought content normal.         Judgment: Judgment normal.

## 2024-11-24 NOTE — PATIENT INSTRUCTIONS
You have declined to have an xray to evaluate left shoulder pain  You are willing to try a low dose prednisone.  You MUST take it with food. You have been prescribed prednisone. Take with food. Do NOT take any NSAID products (motrin, ibuprofen, aleve, advil) while taking this medication.  You may take tylenol.     You are to use lidoderm patches 12 hours on and 12 hours off  You may try motrin cream on the shoulder    Follow up with your PCP in 3-5 days  Go to the ED if symptoms worsen   See orthopedics if symptoms continue     [facility] does not provide narcotic prescriptions for non severe injuries/pain - speak with your PCP

## 2025-01-07 ENCOUNTER — TELEPHONE (OUTPATIENT)
Dept: GASTROENTEROLOGY | Facility: CLINIC | Age: 60
End: 2025-01-07

## 2025-01-07 NOTE — TELEPHONE ENCOUNTER
Patient is coming due for a colon recall in early February with Dr. Zabala for family hx of colon cancer in her mom. Left message for pt to call and schedule.